# Patient Record
Sex: MALE | Race: WHITE | Employment: FULL TIME | URBAN - METROPOLITAN AREA
[De-identification: names, ages, dates, MRNs, and addresses within clinical notes are randomized per-mention and may not be internally consistent; named-entity substitution may affect disease eponyms.]

---

## 2018-08-27 ENCOUNTER — OFFICE VISIT (OUTPATIENT)
Dept: SURGERY | Age: 48
End: 2018-08-27

## 2018-08-27 VITALS
BODY MASS INDEX: 31.42 KG/M2 | SYSTOLIC BLOOD PRESSURE: 158 MMHG | DIASTOLIC BLOOD PRESSURE: 80 MMHG | TEMPERATURE: 98.2 F | OXYGEN SATURATION: 96 % | HEART RATE: 75 BPM | RESPIRATION RATE: 18 BRPM | HEIGHT: 72 IN | WEIGHT: 232 LBS

## 2018-08-27 DIAGNOSIS — D17.1 LIPOMA OF TORSO: Primary | ICD-10-CM

## 2018-08-27 NOTE — PROGRESS NOTES
Surgery Consult:  lipoma  Requesting physician:  Dr. Marina Varner:   Patient 50 y.o.  male presents with couple lumps on his right flank for few years. It's increasing in size and complains of pain especially with palpation. Denies any recent trauma to this area. No skin changes overlying the lump. No history of infection in this location. Past Medical & Surgical History:  History reviewed. No pertinent past medical history. History reviewed. No pertinent surgical history. Social History:  Social History     Social History    Marital status:      Spouse name: N/A    Number of children: N/A    Years of education: N/A     Occupational History    Not on file. Social History Main Topics    Smoking status: Former Smoker    Smokeless tobacco: Never Used    Alcohol use No    Drug use: No    Sexual activity: Not on file     Other Topics Concern    Not on file     Social History Narrative        Family History:  Family History   Problem Relation Age of Onset    Cancer Mother     Diabetes Mother     Diabetes Brother     Headache Maternal Grandfather     Diabetes Maternal Grandfather         Medications:  Current Outpatient Prescriptions   Medication Sig    HYDROcodone-acetaminophen (NORCO) 5-325 mg per tablet Take 1 tablet by mouth every four (4) hours as needed for Pain.  ondansetron (ZOFRAN ODT) 4 mg disintegrating tablet Take 1 tablet by mouth every eight (8) hours as needed for Nausea.  diphenoxylate-atropine (LOMOTIL) 2.5-0.025 mg per tablet Take 1 tablet by mouth four (4) times daily as needed for Diarrhea (1 tab after each stool for max 8 per day). Take after each stool for a maximum of 8 tablets daily     No current facility-administered medications for this visit. Allergies:  No Known Allergies    Review of Systems  A comprehensive review of systems was negative except for that written in the HPI.     Objective:     Exam:    Visit Vitals    BP 158/80 (BP 1 Location: Right arm, BP Patient Position: Sitting)    Pulse 75    Temp 98.2 °F (36.8 °C) (Oral)    Resp 18    Ht 6' (1.829 m)    Wt 105.2 kg (232 lb)    SpO2 96%    BMI 31.46 kg/m2     General appearance: alert, cooperative, no distress, appears stated age  Lungs: clear to auscultation bilaterally  Heart: regular rate and rhythm  Extremities: extremities normal, atraumatic, no cyanosis or edema. ABHI. Skin: Skin color, texture, turgor normal.  Right flank area with 3 x 1 and 2 x 1 cm rubbery mobile mass. These are approximately 3 cm apart. No erythema or skin changes overlying the lumps. Neurologic: Grossly normal    Assessment:     Right flank lipoma x 2    Plan:     Excision of right flank lipoma x 2 vs observation discussed. Risks, benefit, and alternative to surgery was discussed with the patient. The risks of surgery include but not limited to infection, bleeding, recurrence, and the risks of anesthetic. Patient wants to think about it and get back to us. All questions answered.

## 2018-08-27 NOTE — MR AVS SNAPSHOT
[address], [address] [address] 
[phone] Patient: Michael Chavarria MRN: [id number] Visit Information Date & Time Provider Department Dept. Phone Encounter #  
 8/27/2018  3:20 PM Ginger Cutler MD [facility] [id number] Upcoming Health Maintenance Date Due DTaP/Tdap/Td series (1 - Tdap) 3/26/1991 Influenza Age 5 to Adult 8/1/2018 Allergies as of 8/27/2018  Review Complete On: 8/27/2018 By: Ginger Cutler MD  
 No Known Allergies Current Immunizations  Never Reviewed No immunizations on file. Not reviewed this visit You Were Diagnosed With   
  
 Codes Comments Lipoma of torso    -  Primary ICD-10-CM: D17.1 ICD-9-CM: 214.1 Vitals BP Pulse Temp Resp Height(growth percentile) Weight(growth percentile) 158/80 (BP 1 Location: Right arm, BP Patient Position: Sitting) 75 98.2 °F (36.8 °C) (Oral) 18 6' (1.829 m) 232 lb (105.2 kg) SpO2 BMI Smoking Status 96% 31.46 kg/m2 Former Smoker BMI and BSA Data Body Mass Index Body Surface Area  
 31.46 kg/m 2 2.31 m 2 Preferred Pharmacy Pharmacy Name Phone [facility] [address] [place] [address] Dr [place], [address] [phone] Your Updated Medication List  
  
   
This list is accurate as of 8/27/18  4:50 PM.  Always use your most recent med list.  
  
  
  
  
 diphenoxylate-atropine 2.5-0.025 mg per tablet Commonly known as:  LOMOTIL Take 1 tablet by mouth four (4) times daily as needed for Diarrhea (1 tab after each stool for max 8 per day). Take after each stool for a maximum of 8 tablets daily HYDROcodone-acetaminophen 5-325 mg per tablet Commonly known as:  Doris Arts Take 1 tablet by mouth every four (4) hours as needed for Pain. ondansetron 4 mg disintegrating tablet Commonly known as:  ZOFRAN ODT  
 Take 1 tablet by mouth every eight (8) hours as needed for Nausea. Introducing [facility]! [employer] introduces [employer] patient portal. Now you can access parts of your medical record, email your doctor's office, and request medication refills online. 1. In your internet browser, go to [email or web address] 2. Click on the First Time User? Click Here link in the Sign In box. You will see the New Member Sign Up page. 3. Enter your Wandera Access Code exactly as it appears below. You will not need to use this code after youve completed the sign-up process. If you do not sign up before the expiration date, you must request a new code. · Wandera Access Code: [email or web address] Expires: 11/25/2018  3:30 PM 
 
4. Enter the last four digits of your Social Security Number (xxxx) and Date of Birth (mm/dd/yyyy) as indicated and click Submit. You will be taken to the next sign-up page. 5. Create a Wandera ID. This will be your Wandera login ID and cannot be changed, so think of one that is secure and easy to remember. 6. Create a Wandera password. You can change your password at any time. 7. Enter your Password Reset Question and Answer. This can be used at a later time if you forget your password. 8. Enter your e-mail address. You will receive e-mail notification when new information is available in [address] 9. Click Sign Up. You can now view and download portions of your medical record. 10. Click the Download Summary menu link to download a portable copy of your medical information. If you have questions, please visit the Frequently Asked Questions section of the Wandera website. Remember, W[employer]era is NOT to be used for urgent needs. For medical emergencies, dial 911. Now available from your iPhone and Android! Please provide this summary of care documentation to your next provider. Your primary care clinician is listed as Shira Burk. If you have any questions after today's visit, please call [phone].

## 2018-08-27 NOTE — PROGRESS NOTES
Chief Complaint   Patient presents with    New Patient    Mass     on abdomen; referred by Dr. Roderick Whitfield       1. Have you been to the ER, urgent care clinic since your last visit? Hospitalized since your last visit? No    2. Have you seen or consulted any other health care providers outside of the [facility] since your last visit? Include any pap smears or colon screening.  No

## 2019-11-05 ENCOUNTER — HOSPITAL ENCOUNTER (OUTPATIENT)
Dept: NON INVASIVE DIAGNOSTICS | Age: 49
Discharge: HOME | End: 2019-11-05
Payer: COMMERCIAL

## 2019-11-05 VITALS
HEART RATE: 76 BPM | RESPIRATION RATE: 21 BRPM | DIASTOLIC BLOOD PRESSURE: 69 MMHG | OXYGEN SATURATION: 96 % | WEIGHT: 220 LBS | HEIGHT: 72 IN | BODY MASS INDEX: 29.8 KG/M2 | SYSTOLIC BLOOD PRESSURE: 133 MMHG

## 2019-11-05 DIAGNOSIS — I35.0 AORTIC VALVE STENOSIS, ETIOLOGY OF CARDIAC VALVE DISEASE UNSPECIFIED: ICD-10-CM

## 2019-11-05 DIAGNOSIS — I34.1 MITRAL PROLAPSE: ICD-10-CM

## 2019-11-05 PROCEDURE — 74011250636 HC RX REV CODE- 250/636: Performed by: INTERNAL MEDICINE

## 2019-11-05 PROCEDURE — 74011000250 HC RX REV CODE- 250: Performed by: INTERNAL MEDICINE

## 2019-11-05 PROCEDURE — 99152 MOD SED SAME PHYS/QHP 5/>YRS: CPT

## 2019-11-05 PROCEDURE — 93325 DOPPLER ECHO COLOR FLOW MAPG: CPT

## 2019-11-05 PROCEDURE — 99153 MOD SED SAME PHYS/QHP EA: CPT

## 2019-11-05 RX ORDER — MIDAZOLAM HYDROCHLORIDE 1 MG/ML
.5-2 INJECTION, SOLUTION INTRAMUSCULAR; INTRAVENOUS
Status: DISCONTINUED | OUTPATIENT
Start: 2019-11-05 | End: 2019-11-05

## 2019-11-05 RX ORDER — LIDOCAINE HYDROCHLORIDE 20 MG/ML
15 SOLUTION OROPHARYNGEAL ONCE
Status: COMPLETED | OUTPATIENT
Start: 2019-11-05 | End: 2019-11-05

## 2019-11-05 RX ADMIN — MIDAZOLAM 2 MG: 1 INJECTION INTRAMUSCULAR; INTRAVENOUS at 10:35

## 2019-11-05 RX ADMIN — MIDAZOLAM 1 MG: 1 INJECTION INTRAMUSCULAR; INTRAVENOUS at 10:42

## 2019-11-05 RX ADMIN — MIDAZOLAM 1 MG: 1 INJECTION INTRAMUSCULAR; INTRAVENOUS at 10:39

## 2019-11-05 RX ADMIN — BENZOCAINE, BUTAMBEN, AND TETRACAINE HYDROCHLORIDE 1 SPRAY: .028; .004; .004 AEROSOL, SPRAY TOPICAL at 10:33

## 2019-11-05 RX ADMIN — LIDOCAINE HYDROCHLORIDE 15 ML: 20 SOLUTION ORAL; TOPICAL at 10:32

## 2019-11-05 RX ADMIN — MEPERIDINE HYDROCHLORIDE 25 MG: 50 INJECTION, SOLUTION INTRAMUSCULAR; INTRAVENOUS; SUBCUTANEOUS at 10:39

## 2019-11-05 RX ADMIN — MEPERIDINE HYDROCHLORIDE 25 MG: 50 INJECTION, SOLUTION INTRAMUSCULAR; INTRAVENOUS; SUBCUTANEOUS at 10:35

## 2019-11-05 NOTE — DISCHARGE INSTRUCTIONS
AFTER YOUR TRANSESOPHAGEAL ECHOCARDIOGRAM    Be sure someone else drives you home; do not drive today. You may feel drowsy for several hours. Do not eat or drink for at least two hours after your procedure. Your throat will be numb and there is a risk you might have difficulty swallowing for a while. Be careful when you do eat or drink for the first time especially with hot fluids since you could easily burn your throat. Call your doctor if:    · You are bleeding from your throat or mouth. · You have trouble breathing all of a sudden. · You have chest pain or any pain that spreads to your neck, jaw, or arms. · You have questions or concerns. · You have a fever greater than 101°F.    Special Instructions:  No driving for 24 hours.

## 2019-11-05 NOTE — PROGRESS NOTES
Discharge instructions reviewed with patient and wife, Leonor Gonzalez. Allowed adequate time to ask questions, all questions answered. Printed copy of AVS given to patient. All belongings gathered, IV and tele discontinued. Transported via wheelchair to main entrance and into care of family.

## 2019-11-05 NOTE — PROGRESS NOTES
Patient arrived to Non-Invasive Cardiology Lab for Out Patient JEANIE Procedure. Staff introduced to patient. Patient identifiers verified with Name and Date of Birth. Procedure verified with patient. Consent forms reviewed and signed by patient or authorized representative and verified. Allergies verified. Patient informed of procedure and plan of care. Questions answered with review. Patient on cardiac monitor, non-invasive blood pressure, SPO2 monitor. On room air. Patient is A&Ox3. Patient reports no complaints. Patient on stretcher, in low position, with side rails up. Patient instructed to call for assistance as needed. Family in waiting room.

## 2020-01-07 ENCOUNTER — HOSPITAL ENCOUNTER (OUTPATIENT)
Dept: GENERAL RADIOLOGY | Age: 50
Discharge: HOME | End: 2020-01-07
Payer: COMMERCIAL

## 2020-01-07 DIAGNOSIS — R06.02 SHORTNESS OF BREATH: ICD-10-CM

## 2020-01-07 PROCEDURE — 71046 X-RAY EXAM CHEST 2 VIEWS: CPT

## 2020-01-16 ENCOUNTER — HOSPITAL ENCOUNTER (OUTPATIENT)
Age: 50
Discharge: HOME | End: 2020-01-16
Attending: INTERNAL MEDICINE | Admitting: INTERNAL MEDICINE
Payer: COMMERCIAL

## 2020-01-16 VITALS
TEMPERATURE: 98.5 F | BODY MASS INDEX: 29.16 KG/M2 | SYSTOLIC BLOOD PRESSURE: 143 MMHG | HEIGHT: 73 IN | OXYGEN SATURATION: 98 % | WEIGHT: 220 LBS | DIASTOLIC BLOOD PRESSURE: 71 MMHG | HEART RATE: 70 BPM | RESPIRATION RATE: 19 BRPM

## 2020-01-16 DIAGNOSIS — R07.9 CHEST PAIN, UNSPECIFIED TYPE: ICD-10-CM

## 2020-01-16 LAB — END DIASTOLIC PRESSURE: 11

## 2020-01-16 PROCEDURE — C1760 CLOSURE DEV, VASC: HCPCS | Performed by: INTERNAL MEDICINE

## 2020-01-16 PROCEDURE — 74011250636 HC RX REV CODE- 250/636: Performed by: INTERNAL MEDICINE

## 2020-01-16 PROCEDURE — C1894 INTRO/SHEATH, NON-LASER: HCPCS | Performed by: INTERNAL MEDICINE

## 2020-01-16 PROCEDURE — 74011250637 HC RX REV CODE- 250/637: Performed by: INTERNAL MEDICINE

## 2020-01-16 PROCEDURE — 99152 MOD SED SAME PHYS/QHP 5/>YRS: CPT | Performed by: INTERNAL MEDICINE

## 2020-01-16 PROCEDURE — 74011000250 HC RX REV CODE- 250: Performed by: INTERNAL MEDICINE

## 2020-01-16 PROCEDURE — 93458 L HRT ARTERY/VENTRICLE ANGIO: CPT | Performed by: INTERNAL MEDICINE

## 2020-01-16 PROCEDURE — 77030029065 HC DRSG HEMO QCLOT ZMED -B: Performed by: INTERNAL MEDICINE

## 2020-01-16 PROCEDURE — 77030004549 HC CATH ANGI DX PRF MRTM -A: Performed by: INTERNAL MEDICINE

## 2020-01-16 PROCEDURE — C1769 GUIDE WIRE: HCPCS | Performed by: INTERNAL MEDICINE

## 2020-01-16 PROCEDURE — 99153 MOD SED SAME PHYS/QHP EA: CPT | Performed by: INTERNAL MEDICINE

## 2020-01-16 PROCEDURE — 74011636320 HC RX REV CODE- 636/320: Performed by: INTERNAL MEDICINE

## 2020-01-16 RX ORDER — ACETAMINOPHEN 500 MG
500 TABLET ORAL ONCE
Status: COMPLETED | OUTPATIENT
Start: 2020-01-16 | End: 2020-01-16

## 2020-01-16 RX ORDER — SODIUM CHLORIDE 0.9 % (FLUSH) 0.9 %
5-40 SYRINGE (ML) INJECTION EVERY 8 HOURS
Status: DISCONTINUED | OUTPATIENT
Start: 2020-01-16 | End: 2020-01-16 | Stop reason: HOSPADM

## 2020-01-16 RX ORDER — HEPARIN SODIUM 200 [USP'U]/100ML
INJECTION, SOLUTION INTRAVENOUS
Status: COMPLETED | OUTPATIENT
Start: 2020-01-16 | End: 2020-01-16

## 2020-01-16 RX ORDER — SODIUM CHLORIDE 9 MG/ML
100 INJECTION, SOLUTION INTRAVENOUS CONTINUOUS
Status: DISPENSED | OUTPATIENT
Start: 2020-01-16 | End: 2020-01-16

## 2020-01-16 RX ORDER — GUAIFENESIN 100 MG/5ML
81 LIQUID (ML) ORAL
Status: COMPLETED | OUTPATIENT
Start: 2020-01-16 | End: 2020-01-16

## 2020-01-16 RX ORDER — FENTANYL CITRATE 50 UG/ML
INJECTION, SOLUTION INTRAMUSCULAR; INTRAVENOUS AS NEEDED
Status: DISCONTINUED | OUTPATIENT
Start: 2020-01-16 | End: 2020-01-16 | Stop reason: HOSPADM

## 2020-01-16 RX ORDER — DIPHENHYDRAMINE HYDROCHLORIDE 50 MG/ML
25 INJECTION, SOLUTION INTRAMUSCULAR; INTRAVENOUS
Status: DISCONTINUED | OUTPATIENT
Start: 2020-01-16 | End: 2020-01-16 | Stop reason: HOSPADM

## 2020-01-16 RX ORDER — NALOXONE HYDROCHLORIDE 0.4 MG/ML
0.4 INJECTION, SOLUTION INTRAMUSCULAR; INTRAVENOUS; SUBCUTANEOUS AS NEEDED
Status: DISCONTINUED | OUTPATIENT
Start: 2020-01-16 | End: 2020-01-16 | Stop reason: HOSPADM

## 2020-01-16 RX ORDER — SODIUM CHLORIDE 0.9 % (FLUSH) 0.9 %
5-40 SYRINGE (ML) INJECTION AS NEEDED
Status: DISCONTINUED | OUTPATIENT
Start: 2020-01-16 | End: 2020-01-16 | Stop reason: HOSPADM

## 2020-01-16 RX ORDER — MIDAZOLAM HYDROCHLORIDE 1 MG/ML
INJECTION, SOLUTION INTRAMUSCULAR; INTRAVENOUS AS NEEDED
Status: DISCONTINUED | OUTPATIENT
Start: 2020-01-16 | End: 2020-01-16 | Stop reason: HOSPADM

## 2020-01-16 RX ORDER — LIDOCAINE HYDROCHLORIDE 10 MG/ML
INJECTION, SOLUTION EPIDURAL; INFILTRATION; INTRACAUDAL; PERINEURAL AS NEEDED
Status: DISCONTINUED | OUTPATIENT
Start: 2020-01-16 | End: 2020-01-16 | Stop reason: HOSPADM

## 2020-01-16 RX ADMIN — Medication 10 ML: at 14:23

## 2020-01-16 RX ADMIN — ACETAMINOPHEN 500 MG: 500 TABLET ORAL at 14:57

## 2020-01-16 RX ADMIN — ASPIRIN 81 MG 81 MG: 81 TABLET ORAL at 08:44

## 2020-01-16 NOTE — PROGRESS NOTES
11:15 AM Patient returned to room 2158 via bed from [facility]. R groin site c/d/i no bleeding and no hematoma. Pulses palpable all extremities. VSS. Assessment complete. Patient oriented to room and call bell system. Family at bedside. Bed locked and in lowest position. Will continue to monitor closely. 1:20 PM Hannah Hartman NP, at bedside. 2:45 PM Patient ambulated in hallway with RN x1. Patient denies c/o dizziness and shortness of breath. Patient c/o \"soreness\" and \"tightness\" at R groin site. R groin site remains c/d/i no bleeding and no hematoma post ambulation. Dr. Andressa Rico notified. Orders received for one time dose PO tylenol. See MAR.    3:20 PM PIV removed, tip intact. Discharge instructions reviewed with patient and spouse including, but not limited to, medications, follow up appointments and post cath site care. Patient and spouse given opportunity for questions and verbalized understanding of all instructions. Patient has all belongings and instructions on discharge. R groin cath site remains c/d/i no bleeding and no hematoma on discharge. Patient escorted to private vehicle on discharge. Patient's spouse to drive him home.

## 2020-01-16 NOTE — H&P
Outpatient Cath History and Physical     1/16/2020  Patient: Priyanka Kim 52 y.o. male   Chief Complaint:    [x]   Dyspnea         History of Present Illness: (for details see office notes)    Chest Pain:  [x] none,  consistent with  [] non-cardiac   [] atypical    [] anginal chest pain             [x]   none now    []    on-going  Dyspnea: [] none  [] at rest   [x] with exertion   [] improved   [] unchanged  [] worsening  PND:       [x] none   [] overnight   [] current  Orthopnea: [x] none  [] improved  [] unchanged  [] worsening  Presyncope: [x] none  [] improved  [] unchanged  [] worsening    History:(for details see office notes)  [x]   Severe MR            Past Medical History:   Diagnosis Date    GERD (gastroesophageal reflux disease)        Review of Systems  Except as noted in HPI, patient denies recent fever or chills, nausea, vomiting, diarrhea, hemoptysis, hematemesis, dysuria, myalgias, focal neurologic symptoms, ecchymosis, angioedema, odynophagia, dysphagia, sore throat, earache,rash, melena, hematochezia, depression, GERD, cold intolerance, petechia, bleeding gums, or significant weight loss. Family History   Problem Relation Age of Onset   [facility] Cancer Mother     Diabetes Mother     Diabetes Brother     Headache Maternal Grandfather     Diabetes Maternal Grandfather    (see office notes for details)  Social History     Tobacco Use    Smoking status: Former Smoker    Smokeless tobacco: Never Used   Substance Use Topics    Alcohol use: No   (see office notes for details)    Allergies: No Known Allergies    Prior to Admission Medications (details in office records):  Prior to Admission medications    Medication Sig Start Date End Date Taking? Authorizing Provider   multivitamin, tx-iron-ca-min (THERA-M W/ IRON) 9 mg iron-400 mcg tab tablet Take 1 Tab by mouth daily.    Yes Provider, Historical         Physical Exam:  Overall VSSAF;    Visit Vitals  /75 (BP 1 Location: Left arm, BP Patient Position: At rest)   Pulse (!) 108   Temp 98.4 °F (36.9 °C)   Resp 15   Ht 6' 1\" (1.854 m)   Wt 99.8 kg (220 lb)   SpO2 99%   BMI 29.03 kg/m²     Temp (24hrs), Av.4 °F (36.9 °C), Min:98.4 °F (36.9 °C), Max:98.4 °F (36.9 °C)    General Appearance: Well developed, well nourished, no acute distress. Ears/Nose/Mouth/Throat:   Hearing grossly normal.     JVP: WNL   Resp:   Lungs clear to auscultation bilaterally. Nl resp effort. Cardiovascular:  RRR, S1, S2 normal, no new murmur. No gallop or rub. Abdomen:   Soft, non-tender, bowel sounds are present. Extremities: No edema bilaterally. Skin:  Neuro: Warm and dry. A/O x3, grossly nonfocal     Labs: per outpatient records  CXR: per outpatient records    Plan of Care/Planned Procedure:  Risks, benefits, and alternatives reviewed with patient and he agrees to proceed with the procedure. For other plans, see orders.     Romulo Sher MD

## 2020-01-16 NOTE — PROGRESS NOTES
I met with Mr. Arleen Buck and his wife. I provided them an appointment on 1- at [facility] in the office as well as initial paperwork.  We appreciate the referral.

## 2020-01-16 NOTE — Clinical Note
TRANSFER - OUT REPORT:     Verbal report given to: ja diamond. Report consisted of patient's Situation, Background, Assessment and   Recommendations(SBAR). Opportunity for questions and clarification was provided. Patient transported with a Registered Nurse and [address] [place] [address] / [facility]. Patient transported to: [facility].

## 2020-01-16 NOTE — Clinical Note
Single view of the left ventricle obtained using power injection. Total volume = 30 mL. Rate = 10 mL/sec. Pressure = 900 PSI.  Rate of rise = 1.8.

## 2020-01-16 NOTE — DISCHARGE INSTRUCTIONS
[address]    [phone]  [place], [place] [address]    [email or web address]    Patient Discharge Instructions    Gretta Campbell / [id number] : 1970    Admitted 2020 Discharged 2020     · It is important that you take the medication exactly as they are prescribed. · Keep your medication in the bottles provided by the pharmacist and keep a list of the medication names, dosages, and times to be taken in your wallet. · Do not take other medications without consulting your doctor. BRING ALL OF YOUR MEDICINES or a list of medicines with dosages TO YOUR OFFICE VISIT with Dr. Estephanie Sanford. Cardiac Catheterization  Discharge Instructions     Do not drive, operate any machinery, or sign any legal documents for 24 hours after your procedure. You must have someone to drive you home.  You may take a shower 24 hours after your cardiac catheterization. Be sure to get the dressing wet and then remove it; gently wash the area with warm soapy water. Pat dry and leave open to air. To help prevent infections, be sure to keep the cath site clean and dry. No lotions, creams, powders, ointments, etc. in the cath site for approximately 1 week.  Do not take a tub bath, get in a hot tub or swimming pool for approximately 5 days or until the cath site is completely healed.  No strenuous activity or heavy lifting over 10 lbs. for 7 days.  Drink plenty of fluids for 24-48 hours after your cath to flush the contrast dye from your kidneys. No alcoholic beverages for 24 hours. You may resume your previous diet (low fat, low cholesterol) after your cath.  After your cath, some bruising or discomfort is common during the healing process. Tylenol, 1-2 tablets every 6 hours as needed, is recommended if you experience any discomfort.   If you experience any signs or symptoms of infection such as fever, chills, or poorly healing incision, persistent tenderness or swelling in the groin, redness and/or warmth to the touch, numbness, significant tingling or pain at the groin site or affected extremity, rash, drainage from the cath site, or if the leg feels tight or swollen, call your physician right away.  If bleeding at the cath site occurs, take a clean gauze pad and apply direct pressure to the groin just above the puncture site. Call 911 immediately, and continue to apply direct pressure until an ambulance gets to your location.  You may return to work  2  days after your cardiac cath if no bleeding at the cath site. If you are smoking, please stop. Smoking Cessation Program is a free, phone/text/email based, smoking cessation program. The program is individualized to meet each patient's needs. To enroll use this link [email or web address]/[email or web address]      Follow-up:   Follow-up Information     Follow up With Specialties Details Why Contact Michael Blevins MD Cardiology Schedule an appointment as soon as possible for a visit in 3 months  [address]  [id number]  [facility]  [phone]      Nate Jimenez MD Cardiothoracic Surgery  as they direct for mitral valve repair. [address] [facility]. [address] [facility]  [phone]            Information obtained by :  I understand that if any problems occur once I am at home I am to contact my physician. I understand and acknowledge receipt of the instructions indicated above.                                                                                                                                            R.N.'s Signature                                                                  Date/Time                                                                                                                                              Patient or Representative Signature                                                          Date/Time      Michael Low MD      [address]    [phone], [address]    [email or web address]

## 2020-01-16 NOTE — PROGRESS NOTES
1/16/2020      To whom it may concern:    Heather Piña was seen today. The patient may return to work 1/20/20. He should not lift more than 20 lbs until 1/23/20. Aileen Blake MD        [address]   [phone]  [address]    [email or web address]

## 2020-01-16 NOTE — PROGRESS NOTES
1/16/2020 11:21 AM  Patient without complaints. Last VS:   Visit Vitals  /64 (BP 1 Location: Right arm, BP Patient Position: At rest)   Pulse 67   Temp 98.4 °F (36.9 °C)   Resp 14   Ht 6' 1\" (1.854 m)   Wt 99.8 kg (220 lb)   SpO2 97%   BMI 29.03 kg/m²     Cath site without hematoma, bleeding or new bruit. Distal pulses at baseline. Continue current plan of care. Results of cath discussed with patient and available family members.

## 2020-01-21 ENCOUNTER — OFFICE VISIT (OUTPATIENT)
Dept: CARDIOTHORACIC SURGERY | Age: 50
End: 2020-01-21

## 2020-01-21 VITALS
SYSTOLIC BLOOD PRESSURE: 132 MMHG | WEIGHT: 230 LBS | DIASTOLIC BLOOD PRESSURE: 78 MMHG | HEIGHT: 73 IN | TEMPERATURE: 98.1 F | OXYGEN SATURATION: 98 % | BODY MASS INDEX: 30.48 KG/M2 | HEART RATE: 83 BPM

## 2020-01-21 DIAGNOSIS — I34.0 NONRHEUMATIC MITRAL VALVE REGURGITATION: Primary | ICD-10-CM

## 2020-01-21 DIAGNOSIS — K21.9 GASTROESOPHAGEAL REFLUX DISEASE WITHOUT ESOPHAGITIS: ICD-10-CM

## 2020-01-21 NOTE — PROGRESS NOTES
Patient: Arther Galeazzi   Age: 52 y.o. Patient Care Team:  Froylan Li MD as PCP - General (Family Practice)  Tisha Hunt MD as Surgeon (General Surgery)    PCP: Froylan Li MD    Cardiologist: Dr. Axel Guzman    Diagnosis/Reason for Consultation: The primary encounter diagnosis was Nonrheumatic mitral valve regurgitation. A diagnosis of Gastroesophageal reflux disease without esophagitis was also pertinent to this visit. Problem List: MR, Atypical Chest Pain, Dyspnea, Murmur, Obesity, GERD      HPI: 52 y.o.  male who is a [employer]  with PMHx of MR, Obesity,and Atypical Chest Pain that is referred to the [address] [place] [address] by Dr. Adarsh Foss for interventional evaluation of his severe mitral regurgitation. .Mr. Trino Zhou has complaints of fatigue,chest pain since he was a child, and dyspnea on exertion for about six months. He has undergone extensive cardiac work up with Dr. Adarsh Foss including TTE, JEANIE, and Cardiac Cath. He was noted to have Severe Mitral Regurgitation and was referred for surgical consultation. He is not on any medication currently except multivitamin. He is a nonsmoker and rarely drinks alcohol. NYHA Classification: Class II   Class II (Mild): Slight limitation of physical activity. Comfortable at rest, but ordinary physical activity results in fatigue, palpitation, or dyspnea.        Angina Classification: Class 0   Class 0: No symptoms    Past Medical History:   Diagnosis Date    GERD (gastroesophageal reflux disease)      Endocarditis: no  Pulmonary HTN: mild Greater than 2/3 systemic: no  Immunocompromised/Steroids: no  Heart Failure Admission w/in past year:  no  Heart Failure Admission w/in past 2 weeks: no  Liver Dz/Cirrhosis: no  If yes, MELD score:  Na    Last Dental Visit:  11/2019   Any dental pain/concerns: na  Past Surgical History:   Procedure Laterality Date    HX ORTHOPAEDIC      L arm tendon repair Social History     Tobacco Use    Smoking status: Former Smoker    Smokeless tobacco: Never Used   Substance Use Topics    Alcohol use: No      Family History   Problem Relation Age of Onset    Cancer Mother     Diabetes Mother     Diabetes Brother     Headache Maternal Grandfather     Diabetes Maternal Grandfather      Prior to Admission medications    Medication Sig Start Date End Date Taking? Authorizing Provider   multivitamin, tx-iron-ca-min (THERA-M W/ IRON) 9 mg iron-400 mcg tab tablet Take 1 Tab by mouth daily. Provider, Historical       No Known Allergies    Current Medications:   Current Outpatient Medications   Medication Sig Dispense Refill    multivitamin, tx-iron-ca-min (THERA-M W/ IRON) 9 mg iron-400 mcg tab tablet Take 1 Tab by mouth daily. Vitals: Blood pressure 132/78, pulse 83, temperature 98.1 °F (36.7 °C), temperature source Oral, height 6' 1\" (1.854 m), weight 230 lb (104.3 kg), SpO2 98 %. Allergies: has No Known Allergies.     Father  in 46s of cancer  Mother ovarian cancer  Grandfather CAD    Review of Systems: Pertinent Positives per HPI   [] Unable to obtain  ROS due to  []mental status change  []sedated   []intubated   [x]Total of 13 systems reviewed as follows:  Constitutional: Positive for fatigue,Negative fever, negative chills  Eyes:   Negative for amauroses fugax  ENT:   Negative sore throat,oral absecess  Endocrine Negative for thyroid replacement Rx; goiter; DM  Respiratory:  Negative chronic cough,sputum production  Cards:   Positive MEDINA,atypical chest pain,Negative for palpitations, lower extremity edema, varicosities, claudication  GI:   Negative for dysphagia, bleeding, nausea, vomiting, diarrhea, and abdominal pain  Genitourinary: Negative for frequency, dysuria, BPH   Integument:  Negative for rash and pruritus  Hematologic:  Negative for easy bruising; bleeding dyscarsia  Musculoskel: Negative for muscle weakness inhibiting ambulation  Neurological:  Negative for stroke, TIA, syncope, dizziness  Behavl/Psych: Negative for feelings of anxiety, depression     Cardiovascular Testing:   EK2019  NSR, QRSd 110, T Wave inversion, Angelia 130ms    TTE: 10/14/2019  LVEF 60%, 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 EF of 65%. 2) Normal RV size and function. 3) Structurally normal aortic valve; No aortic stenosis or regurgitation. 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 No mitral stenosis. 5) Structurally normal tricuspid valve; No tricuspid stenosis; trivial tricuspid regurgitation. 6) Pulmonary valve appears structurally normal.  7) LOREN is without thrombus. 8) No intra-cardiac shunt is seen on color doppler or agitated saline injection. 9) No intra-cardiac thrombus or vegetation is seen. 10) The visualized portions of the ascending aorta, aortic arch, and descending aorta appear normal.     Conclusion: MV and MR as above.        Cardiac catheterization: 2020  1. Normal LV function. 2. No CAD. 3. Severe MR     PLAN: Surgery consult for mitral valve repair.            Coronary Findings     Diagnostic   Dominance: Right   Left Main   The vessel is large. The vessel is angiographically normal.   Left Anterior Descending   The proximal segment of the vessel is large. The middle segment of the vessel is moderate in size. The distal segment of the vessel is moderate in size. The vessel is angiographically normal. 3 small diagonals. Ramus Intermedius   The vessel is moderate in size. The vessel is angiographically normal.   Left Circumflex   The vessel is large.  Trivial high OM then circ becomes a large OM and trivial AV groove circ. Right Coronary Artery   The vessel is moderate in size. The vessel is angiographically normal. Small PDA and postero-lateral vessels. Intervention     No interventions have been documented. Measurements     EDP: 11             Left Heart     Left Ventricle The left ventricular size is in the upper limits of normal. The estimated EF = 60 - 65%. There is severe (4+) mitral regurgitation. Aortic Valve There is no aortic valve stenosis. PA and Lateral: 1- WNL    Carotid Dopplers: not done    PFTs: FEV1/Predicted:  Not done  Normal FEV1 > 1 Liter, Predicted = 100%, DLCO > 80%    Mild Lung Disease (60-70% Predicted)    Moderate Lung Disease (50-55% Predicted)    Severe Lung Disease (< 50% Predicted or PO2 < 60 or pCO2>50 on RA)      Physical Exam:  General: Well nourished well groomed male appearing stated age accompanied by his wife  Neuro: A&OX3. CASTILLO. PERRL. Steady unassisted gait  Head:Normocephalic. Atraumatic. Symmetrical  Neck: Trachea Midline  Resp: CTA B. No Adv BS/cough/sputum/tachypnea with seated conversation  CV: S1S2 RRR. ANTHONY II/VI. No JVD/carotid bruits. Pink/warm/dry extremities. No LE peripheral edema  GI:Benign ab. Soft. NT/ND. Active BS  : Voids  Integ: No obvious s/s of infection or breakdown  Musculo/Skeletal: FROM in all major joints.  normal muscle tone      STS 2.9 Risk Score / Predicted 30 day mortality: - calculations scanned into EMR  Procedure: MV Repair CALCULATE   Risk of Mortality:  0.364%    Renal Failure:  0.874%    Permanent Stroke:  0.411%    Prolonged Ventilation:  2.824%    DSW Infection:  0.038%    Reoperation:  2.793%    Morbidity or Mortality:  5.453%    Short Length of Stay:  69.222%    Long Length of Stay:  1.399%     Procedure: Isolated MVR CALCULATE   Risk of Mortality:  0.915%    Renal Failure:  1.817%    Permanent Stroke:  0.684%    Prolonged Ventilation:  4.995%    DSW Infection:  0.073%    Reoperation:  4.437% Morbidity or Mortality:  8.766%    Short Length of Stay:  41.654%    Long Length of Stay:  3.678%         Assessment/Plan:   1. Mitral Regurgitation - severe and symptomatic. We discussed Mr. Severiano Diehl in clinic and reviewed available documentation and imaging. Dr. Jr Gonzales review TTE on disc. Will order CTA of chest, abdomen and pelvis and then schedule for surgery. 2. GERD - Consider PPI  3.  Obesity - is limited due to dyspnea  4  Further plan/care by Dr. Jr Gonzales

## 2020-01-30 ENCOUNTER — HOSPITAL ENCOUNTER (OUTPATIENT)
Dept: CT IMAGING | Age: 50
Discharge: HOME | End: 2020-01-30
Attending: NURSE PRACTITIONER
Payer: COMMERCIAL

## 2020-01-30 DIAGNOSIS — I34.0 NONRHEUMATIC MITRAL VALVE REGURGITATION: ICD-10-CM

## 2020-01-30 PROCEDURE — 74174 CTA ABD&PLVS W/CONTRAST: CPT

## 2020-01-30 PROCEDURE — 74011636320 HC RX REV CODE- 636/320: Performed by: NURSE PRACTITIONER

## 2020-01-30 PROCEDURE — 71275 CT ANGIOGRAPHY CHEST: CPT

## 2020-01-30 RX ORDER — SODIUM CHLORIDE 0.9 % (FLUSH) 0.9 %
10 SYRINGE (ML) INJECTION
Status: COMPLETED | OUTPATIENT
Start: 2020-01-30 | End: 2020-01-30

## 2020-01-30 RX ADMIN — Medication 10 ML: at 08:57

## 2020-01-30 RX ADMIN — IOPAMIDOL 100 ML: 755 INJECTION, SOLUTION INTRAVENOUS at 08:57

## 2020-02-17 ENCOUNTER — OFFICE VISIT (OUTPATIENT)
Dept: CARDIOTHORACIC SURGERY | Age: 50
End: 2020-02-17

## 2020-02-17 VITALS
WEIGHT: 233 LBS | HEIGHT: 73 IN | OXYGEN SATURATION: 97 % | TEMPERATURE: 98.3 F | HEART RATE: 100 BPM | BODY MASS INDEX: 30.88 KG/M2 | SYSTOLIC BLOOD PRESSURE: 138 MMHG | DIASTOLIC BLOOD PRESSURE: 82 MMHG

## 2020-02-17 DIAGNOSIS — I34.0 NONRHEUMATIC MITRAL VALVE REGURGITATION: Primary | ICD-10-CM

## 2020-02-18 DIAGNOSIS — I34.0 NONRHEUMATIC MITRAL VALVE REGURGITATION: Primary | ICD-10-CM

## 2020-02-19 NOTE — PROGRESS NOTES
Pt seen and examined. Had a long discussion about risks, benefits and timing, as well as expected hospital course. We decided on March 17th. I answered all of their questions. They are in agreement for a mini-MVr.

## 2020-03-10 ENCOUNTER — HOSPITAL ENCOUNTER (OUTPATIENT)
Dept: PREADMISSION TESTING | Age: 50
Discharge: HOME | End: 2020-03-10
Payer: COMMERCIAL

## 2020-03-10 ENCOUNTER — HOSPITAL ENCOUNTER (OUTPATIENT)
Dept: VASCULAR SURGERY | Age: 50
Discharge: HOME | End: 2020-03-10
Attending: NURSE PRACTITIONER
Payer: COMMERCIAL

## 2020-03-10 VITALS
OXYGEN SATURATION: 99 % | DIASTOLIC BLOOD PRESSURE: 80 MMHG | BODY MASS INDEX: 31.77 KG/M2 | HEART RATE: 78 BPM | SYSTOLIC BLOOD PRESSURE: 141 MMHG | RESPIRATION RATE: 20 BRPM | TEMPERATURE: 98.2 F | WEIGHT: 234.57 LBS | HEIGHT: 72 IN

## 2020-03-10 DIAGNOSIS — I34.0 NONRHEUMATIC MITRAL VALVE REGURGITATION: ICD-10-CM

## 2020-03-10 PROBLEM — Z01.810 PREOP CARDIOVASCULAR EXAM: Status: ACTIVE | Noted: 2020-03-10

## 2020-03-10 PROBLEM — I65.23 BILATERAL CAROTID ARTERY STENOSIS: Status: ACTIVE | Noted: 2020-03-10

## 2020-03-10 LAB
ABO + RH BLD: NORMAL
ALBUMIN SERPL-MCNC: 3.8 G/DL (ref 3.5–5)
ALBUMIN/GLOB SERPL: 1 {RATIO} (ref 1.1–2.2)
ALP SERPL-CCNC: 94 U/L (ref 45–117)
ALT SERPL-CCNC: 30 U/L (ref 12–78)
ANION GAP SERPL CALC-SCNC: 6 MMOL/L (ref 5–15)
APPEARANCE UR: ABNORMAL
APTT PPP: 28.8 SEC (ref 22.1–32)
ARTERIAL PATENCY WRIST A: YES
AST SERPL-CCNC: 18 U/L (ref 15–37)
BACTERIA URNS QL MICRO: NEGATIVE /HPF
BASE EXCESS BLD CALC-SCNC: 2 MMOL/L
BASOPHILS # BLD: 0 K/UL (ref 0–0.1)
BASOPHILS NFR BLD: 1 % (ref 0–1)
BDY SITE: ABNORMAL
BILIRUB SERPL-MCNC: 1.4 MG/DL (ref 0.2–1)
BILIRUB UR QL: NEGATIVE
BLOOD GROUP ANTIBODIES SERPL: NORMAL
BNP SERPL-MCNC: 101 PG/ML
BUN SERPL-MCNC: 11 MG/DL (ref 6–20)
BUN/CREAT SERPL: 9 (ref 12–20)
CA-I BLD-SCNC: 1.23 MMOL/L (ref 1.12–1.32)
CALCIUM SERPL-MCNC: 8.9 MG/DL (ref 8.5–10.1)
CHLORIDE SERPL-SCNC: 106 MMOL/L (ref 97–108)
CO2 SERPL-SCNC: 26 MMOL/L (ref 21–32)
COLOR UR: ABNORMAL
CREAT SERPL-MCNC: 1.24 MG/DL (ref 0.7–1.3)
DIFFERENTIAL METHOD BLD: ABNORMAL
EOSINOPHIL # BLD: 0.1 K/UL (ref 0–0.4)
EOSINOPHIL NFR BLD: 2 % (ref 0–7)
EPITH CASTS URNS QL MICRO: ABNORMAL /LPF
ERYTHROCYTE [DISTWIDTH] IN BLOOD BY AUTOMATED COUNT: 12.6 % (ref 11.5–14.5)
EST. AVERAGE GLUCOSE BLD GHB EST-MCNC: 114 MG/DL
GAS FLOW.O2 O2 DELIVERY SYS: ABNORMAL L/MIN
GLOBULIN SER CALC-MCNC: 3.7 G/DL (ref 2–4)
GLUCOSE SERPL-MCNC: 98 MG/DL (ref 65–100)
GLUCOSE UR STRIP.AUTO-MCNC: NEGATIVE MG/DL
HBA1C MFR BLD: 5.6 % (ref 4–5.6)
HCO3 BLD-SCNC: 26.3 MMOL/L (ref 22–26)
HCT VFR BLD AUTO: 44.8 % (ref 36.6–50.3)
HGB BLD-MCNC: 15.1 G/DL (ref 12.1–17)
HGB UR QL STRIP: NEGATIVE
HYALINE CASTS URNS QL MICRO: ABNORMAL /LPF (ref 0–5)
IMM GRANULOCYTES # BLD AUTO: 0 K/UL (ref 0–0.04)
IMM GRANULOCYTES NFR BLD AUTO: 1 % (ref 0–0.5)
INR PPP: 1 (ref 0.9–1.1)
KETONES UR QL STRIP.AUTO: NEGATIVE MG/DL
LEFT CCA DIST DIAS: 19.1 CM/S
LEFT CCA DIST SYS: 112.8 CM/S
LEFT CCA PROX DIAS: 22.3 CM/S
LEFT CCA PROX SYS: 145.2 CM/S
LEFT ECA DIAS: 13.65 CM/S
LEFT ECA SYS: 119.5 CM/S
LEFT ICA DIST DIAS: 44.3 CM/S
LEFT ICA DIST SYS: 113.9 CM/S
LEFT ICA MID DIAS: 21 CM/S
LEFT ICA MID SYS: 68.8 CM/S
LEFT ICA PROX DIAS: 13.1 CM/S
LEFT ICA PROX SYS: 58.8 CM/S
LEFT ICA/CCA SYS: 1.01
LEFT SUBCLAVIAN DIAS: 0 CM/S
LEFT SUBCLAVIAN SYS: 161.3 CM/S
LEFT VERTEBRAL DIAS: 9.53 CM/S
LEFT VERTEBRAL SYS: 62.8 CM/S
LEUKOCYTE ESTERASE UR QL STRIP.AUTO: NEGATIVE
LYMPHOCYTES # BLD: 2.1 K/UL (ref 0.8–3.5)
LYMPHOCYTES NFR BLD: 36 % (ref 12–49)
MAGNESIUM SERPL-MCNC: 2.2 MG/DL (ref 1.6–2.4)
MCH RBC QN AUTO: 30 PG (ref 26–34)
MCHC RBC AUTO-ENTMCNC: 33.7 G/DL (ref 30–36.5)
MCV RBC AUTO: 89.1 FL (ref 80–99)
MONOCYTES # BLD: 0.4 K/UL (ref 0–1)
MONOCYTES NFR BLD: 6 % (ref 5–13)
NEUTS SEG # BLD: 3.2 K/UL (ref 1.8–8)
NEUTS SEG NFR BLD: 54 % (ref 32–75)
NITRITE UR QL STRIP.AUTO: NEGATIVE
NRBC # BLD: 0 K/UL (ref 0–0.01)
NRBC BLD-RTO: 0 PER 100 WBC
PCO2 BLD: 40 MMHG (ref 35–45)
PH BLD: 7.43 [PH] (ref 7.35–7.45)
PH UR STRIP: 7.5 [PH] (ref 5–8)
PLATELET # BLD AUTO: 212 K/UL (ref 150–400)
PMV BLD AUTO: 10.2 FL (ref 8.9–12.9)
PO2 BLD: 100 MMHG (ref 80–100)
POTASSIUM SERPL-SCNC: 4 MMOL/L (ref 3.5–5.1)
PROT SERPL-MCNC: 7.5 G/DL (ref 6.4–8.2)
PROT UR STRIP-MCNC: NEGATIVE MG/DL
PROTHROMBIN TIME: 10 SEC (ref 9–11.1)
RBC # BLD AUTO: 5.03 M/UL (ref 4.1–5.7)
RBC #/AREA URNS HPF: ABNORMAL /HPF (ref 0–5)
RIGHT CCA DIST DIAS: 16.4 CM/S
RIGHT CCA DIST SYS: 122.3 CM/S
RIGHT CCA PROX DIAS: 13.7 CM/S
RIGHT CCA PROX SYS: 161.3 CM/S
RIGHT ECA DIAS: 11.63 CM/S
RIGHT ECA SYS: 118.1 CM/S
RIGHT ICA DIST DIAS: 23.5 CM/S
RIGHT ICA DIST SYS: 84.6 CM/S
RIGHT ICA MID DIAS: 21.5 CM/S
RIGHT ICA MID SYS: 76.7 CM/S
RIGHT ICA PROX DIAS: 15.6 CM/S
RIGHT ICA PROX SYS: 102.3 CM/S
RIGHT ICA/CCA SYS: 0.8
RIGHT SUBCLAVIAN DIAS: 0 CM/S
RIGHT SUBCLAVIAN SYS: 205.9 CM/S
RIGHT VERTEBRAL DIAS: 13.6 CM/S
RIGHT VERTEBRAL SYS: 78.7 CM/S
SAO2 % BLD: 98 % (ref 92–97)
SODIUM SERPL-SCNC: 138 MMOL/L (ref 136–145)
SP GR UR REFRACTOMETRY: 1.01 (ref 1–1.03)
SPECIMEN EXP DATE BLD: NORMAL
SPECIMEN TYPE: ABNORMAL
THERAPEUTIC RANGE,PTTT: NORMAL SECS (ref 58–77)
TOTAL RESP. RATE, ITRR: 16
TSH SERPL DL<=0.05 MIU/L-ACNC: 1.52 UIU/ML (ref 0.36–3.74)
UROBILINOGEN UR QL STRIP.AUTO: 1 EU/DL (ref 0.2–1)
WBC # BLD AUTO: 5.9 K/UL (ref 4.1–11.1)
WBC URNS QL MICRO: ABNORMAL /HPF (ref 0–4)

## 2020-03-10 PROCEDURE — 83735 ASSAY OF MAGNESIUM: CPT

## 2020-03-10 PROCEDURE — 85610 PROTHROMBIN TIME: CPT

## 2020-03-10 PROCEDURE — 80053 COMPREHEN METABOLIC PANEL: CPT

## 2020-03-10 PROCEDURE — 36415 COLL VENOUS BLD VENIPUNCTURE: CPT

## 2020-03-10 PROCEDURE — 83036 HEMOGLOBIN GLYCOSYLATED A1C: CPT

## 2020-03-10 PROCEDURE — 93005 ELECTROCARDIOGRAM TRACING: CPT

## 2020-03-10 PROCEDURE — 85730 THROMBOPLASTIN TIME PARTIAL: CPT

## 2020-03-10 PROCEDURE — 93880 EXTRACRANIAL BILAT STUDY: CPT

## 2020-03-10 PROCEDURE — 87086 URINE CULTURE/COLONY COUNT: CPT

## 2020-03-10 PROCEDURE — 81001 URINALYSIS AUTO W/SCOPE: CPT

## 2020-03-10 PROCEDURE — 82803 BLOOD GASES ANY COMBINATION: CPT

## 2020-03-10 PROCEDURE — 86850 RBC ANTIBODY SCREEN: CPT

## 2020-03-10 PROCEDURE — 83880 ASSAY OF NATRIURETIC PEPTIDE: CPT

## 2020-03-10 PROCEDURE — 36600 WITHDRAWAL OF ARTERIAL BLOOD: CPT

## 2020-03-10 PROCEDURE — 84443 ASSAY THYROID STIM HORMONE: CPT

## 2020-03-10 PROCEDURE — 85025 COMPLETE CBC W/AUTO DIFF WBC: CPT

## 2020-03-10 RX ORDER — AMIODARONE HYDROCHLORIDE 200 MG/1
400 TABLET ORAL 2 TIMES DAILY
Qty: 24 TAB | Refills: 0 | Status: SHIPPED | OUTPATIENT
Start: 2020-03-10 | End: 2020-03-16

## 2020-03-10 RX ORDER — SODIUM CHLORIDE, SODIUM LACTATE, POTASSIUM CHLORIDE, CALCIUM CHLORIDE 600; 310; 30; 20 MG/100ML; MG/100ML; MG/100ML; MG/100ML
25 INJECTION, SOLUTION INTRAVENOUS CONTINUOUS
Status: CANCELLED | OUTPATIENT
Start: 2020-03-17

## 2020-03-10 RX ORDER — MUPIROCIN 20 MG/G
OINTMENT TOPICAL 2 TIMES DAILY
Qty: 22 G | Refills: 0 | Status: SHIPPED | OUTPATIENT
Start: 2020-03-16 | End: 2020-03-23

## 2020-03-10 RX ORDER — CHLORHEXIDINE GLUCONATE 1.2 MG/ML
15 RINSE ORAL 2 TIMES DAILY
Qty: 1 BOTTLE | Refills: 0 | Status: SHIPPED | OUTPATIENT
Start: 2020-03-16 | End: 2020-03-23

## 2020-03-10 NOTE — PERIOP NOTES
[facility]  Preoperative Instructions        Surgery Date 03/17/20          Time of Arrival 0530  Contact # [phone] cell    1. On the day of your surgery, please report to the Surgical Services Registration Desk and sign in at your designated time. The Surgery Center is located to the right of the Emergency Room. 2. You must have someone with you to drive you home. You should not drive a car for 24 hours following surgery. Please make arrangements for a friend or family member to stay with you for the first 24 hours after your surgery. 3. Do not have anything to eat or drink (including water, gum, mints, coffee, juice) after midnight 03/16/20    . ? This may not apply to medications prescribed by your physician. ?(Please note below the special instructions with medications to take the morning of your procedure.)    4. We recommend you do not drink any alcoholic beverages for 24 hours before and after your surgery. 5. Contact your surgeons office for instructions on the following medications: non-steroidal anti-inflammatory drugs (i.e. Advil, Aleve), vitamins, and supplements. (Some surgeons will want you to stop these medications prior to surgery and others may allow you to take them)  **If you are currently taking Plavix, Coumadin, Aspirin and/or other blood-thinning agents, contact your surgeon for instructions. ** Your surgeon will partner with the physician prescribing these medications to determine if it is safe to stop or if you need to continue taking. Please do not stop taking these medications without instructions from your surgeon    6. Wear comfortable clothes. Wear glasses instead of contacts. Do not bring any money or jewelry. Please bring picture ID, insurance card, and any prearranged co-payment or hospital payment. Do not wear make-up, particularly mascara the morning of your surgery.   Do not wear nail polish, particularly if you are having foot /hand surgery. Wear your hair loose or down, no ponytails, buns, mackenzie pins or clips. All body piercings must be removed. Please shower with antibacterial soap for three consecutive days before and on the morning of surgery, but do not apply any lotions, powders or deodorants after the shower on the day of surgery. Please use a fresh towels after each shower. Please sleep in clean clothes and change bed linens the night before surgery. Please do not shave for 48 hours prior to surgery. Shaving of the face is acceptable. 7. You should understand that if you do not follow these instructions your surgery may be cancelled. If your physical condition changes (I.e. fever, cold or flu) please contact your surgeon as soon as possible. 8. It is important that you be on time. If a situation occurs where you may be late, please call [phone] (OR Holding Area). 9. If you have any questions and or problems, please call [phone] (Pre-admission Testing). 10. Your surgery time may be subject to change. You will receive a phone call the evening prior if your time changes. 11.  If having outpatient surgery, you must have someone to drive you here, stay with you during the duration of your stay, and to drive you home at time of discharge. 12.   In an effort to improve the efficiency, privacy, and safety for all of our Pre-op patients visitors are not allowed in the Holding area. Once you arrive and are registered your family/visitors will be asked to remain in the waiting room. The Pre-op staff will get you from the Surgical Waiting Area and will explain to you and your family/visitors that the Pre-op phase is beginning. The staff will answer any questions and provide instructions for tracking of the patient, by use of the existing tracking number and color-coded status board in the waiting room.   At this time the staff will also ask for your designated spokesperson information in the event that the physician or staff need to provide an update or obtain any pertinent information. The designated spokesperson will be notified if the physician needs to speak to family during the pre-operative phase. If at any time your family/visitors has questions or concerns they may approach the volunteer desk in the waiting area for assistance. Special Instructions: Take prescriptions per Lita Paulino (amiodarone)start today 03/10/20    TAKE ALL MEDICATIONS DAY OF SURGERY EXCEPT:no medications  Please bring bactroban/mupirocin and peridex/chlorhexidine to the hospital for use--may use both morning of surgery      I understand a pre-operative phone call will be made to verify my surgery time. In the event that I am not available, I give permission for a message to be left on my answering service and/or with another person?   yes          ___________________      __________   _________    (Signature of Patient)             (Witness)                (Date and Time)

## 2020-03-10 NOTE — PERIOP NOTES
Incentive Spirometer        Using the incentive spirometer helps expand the small air sacs of your lungs, helps you breathe deeply, and helps improve your lung function. Use your incentive spirometer twice a day (10 breaths each time) prior to surgery. How to Use Your Incentive Spirometer:  1. Hold the incentive spirometer in an upright position. 2. Breathe out as usual.   3. Place the mouthpiece in your mouth and seal your lips tightly around it. 4. Take a deep breath. Breathe in slowly and as deeply as possible. Keep the blue flow rate guide between the arrows. 5. Hold your breath as long as possible. Then exhale slowly and allow the piston to fall to the bottom of the column. 6. Rest for a few seconds and repeat steps one through five at least 10 times. PAT Tidal Volume______2500____________  x_____2___________  Date_____03/10/20__________________    W[place]rey  THE INCENTIVE SPIROMETER WITH YOU TO THE HOSPITAL ON THE DAY OF YOUR SURGERY. Opportunity given to ask and answer questions as well as to observe return demonstration.     Patient signature_____________________________    Witness____________________________

## 2020-03-10 NOTE — PERIOP NOTES
Pamphlet for Heart Valve Replacement/Cardiac Rehab provided with pre-op appointment. Patient will review and call surgeon office for any questions related to surgery procedure. Bar of Dial soap provided for showering 3 days prior to surgery and morning of surgery.

## 2020-03-11 LAB
ATRIAL RATE: 70 BPM
BACTERIA SPEC CULT: NORMAL
BACTERIA SPEC CULT: NORMAL
CALCULATED P AXIS, ECG09: 40 DEGREES
CALCULATED R AXIS, ECG10: 32 DEGREES
CALCULATED T AXIS, ECG11: 32 DEGREES
DIAGNOSIS, 93000: NORMAL
P-R INTERVAL, ECG05: 138 MS
Q-T INTERVAL, ECG07: 388 MS
QRS DURATION, ECG06: 102 MS
QTC CALCULATION (BEZET), ECG08: 419 MS
SERVICE CMNT-IMP: NORMAL
VENTRICULAR RATE, ECG03: 70 BPM

## 2020-03-14 LAB
BACTERIA SPEC CULT: ABNORMAL
CC UR VC: ABNORMAL
SERVICE CMNT-IMP: ABNORMAL

## 2020-04-09 PROBLEM — Z01.810 PREOP CARDIOVASCULAR EXAM: Status: RESOLVED | Noted: 2020-03-10 | Resolved: 2020-04-09

## 2020-04-22 ENCOUNTER — DOCUMENTATION ONLY (OUTPATIENT)
Dept: CARDIOTHORACIC SURGERY | Age: 50
End: 2020-04-22

## 2020-04-22 NOTE — PROGRESS NOTES
Checked in with Mr. Celeste Olvera today, who needs a mini-MVr. We discussed timing of his surgery. Given the current restrictions for visitors are still in place and he reports no change in his symptoms (sob only with extreme exertion) he requested to put off surgery another 3-4 weeks. I will call him again in 3 weeks to re-evaluate.

## 2020-07-16 ENCOUNTER — HOSPITAL ENCOUNTER (OUTPATIENT)
Dept: PREADMISSION TESTING | Age: 50
Discharge: HOME | End: 2020-07-16
Attending: THORACIC SURGERY (CARDIOTHORACIC VASCULAR SURGERY)
Payer: COMMERCIAL

## 2020-07-16 ENCOUNTER — ANESTHESIA EVENT (OUTPATIENT)
Dept: CARDIOTHORACIC SURGERY | Age: 50
DRG: 220 | End: 2020-07-16
Payer: COMMERCIAL

## 2020-07-16 ENCOUNTER — HOSPITAL ENCOUNTER (OUTPATIENT)
Dept: GENERAL RADIOLOGY | Age: 50
Discharge: HOME | End: 2020-07-16
Attending: THORACIC SURGERY (CARDIOTHORACIC VASCULAR SURGERY)
Payer: COMMERCIAL

## 2020-07-16 VITALS
DIASTOLIC BLOOD PRESSURE: 65 MMHG | HEART RATE: 90 BPM | HEIGHT: 72 IN | TEMPERATURE: 98.1 F | WEIGHT: 232.37 LBS | BODY MASS INDEX: 31.47 KG/M2 | SYSTOLIC BLOOD PRESSURE: 130 MMHG

## 2020-07-16 LAB
ALBUMIN SERPL-MCNC: 3.7 G/DL (ref 3.5–5)
ALBUMIN/GLOB SERPL: 0.9 {RATIO} (ref 1.1–2.2)
ALP SERPL-CCNC: 80 U/L (ref 45–117)
ALT SERPL-CCNC: 27 U/L (ref 12–78)
ANION GAP SERPL CALC-SCNC: 5 MMOL/L (ref 5–15)
APPEARANCE UR: CLEAR
APTT PPP: 29.5 SEC (ref 22.1–32)
AST SERPL-CCNC: 14 U/L (ref 15–37)
BACTERIA URNS QL MICRO: NEGATIVE /HPF
BASOPHILS # BLD: 0 K/UL (ref 0–0.1)
BASOPHILS NFR BLD: 1 % (ref 0–1)
BILIRUB SERPL-MCNC: 1.6 MG/DL (ref 0.2–1)
BILIRUB UR QL: NEGATIVE
BNP SERPL-MCNC: 83 PG/ML
BUN SERPL-MCNC: 17 MG/DL (ref 6–20)
BUN/CREAT SERPL: 13 (ref 12–20)
CALCIUM SERPL-MCNC: 8.7 MG/DL (ref 8.5–10.1)
CHLORIDE SERPL-SCNC: 108 MMOL/L (ref 97–108)
CO2 SERPL-SCNC: 26 MMOL/L (ref 21–32)
COLOR UR: NORMAL
CREAT SERPL-MCNC: 1.32 MG/DL (ref 0.7–1.3)
DIFFERENTIAL METHOD BLD: ABNORMAL
EOSINOPHIL # BLD: 0.1 K/UL (ref 0–0.4)
EOSINOPHIL NFR BLD: 2 % (ref 0–7)
EPITH CASTS URNS QL MICRO: NORMAL /LPF
ERYTHROCYTE [DISTWIDTH] IN BLOOD BY AUTOMATED COUNT: 12.2 % (ref 11.5–14.5)
EST. AVERAGE GLUCOSE BLD GHB EST-MCNC: 111 MG/DL
GLOBULIN SER CALC-MCNC: 3.9 G/DL (ref 2–4)
GLUCOSE SERPL-MCNC: 133 MG/DL (ref 65–100)
GLUCOSE UR STRIP.AUTO-MCNC: NEGATIVE MG/DL
HBA1C MFR BLD: 5.5 % (ref 4–5.6)
HCT VFR BLD AUTO: 45 % (ref 36.6–50.3)
HGB BLD-MCNC: 15.7 G/DL (ref 12.1–17)
HGB UR QL STRIP: NEGATIVE
HYALINE CASTS URNS QL MICRO: NORMAL /LPF (ref 0–5)
IMM GRANULOCYTES # BLD AUTO: 0 K/UL (ref 0–0.04)
IMM GRANULOCYTES NFR BLD AUTO: 1 % (ref 0–0.5)
INR PPP: 1 (ref 0.9–1.1)
KETONES UR QL STRIP.AUTO: NEGATIVE MG/DL
LEUKOCYTE ESTERASE UR QL STRIP.AUTO: NEGATIVE
LYMPHOCYTES # BLD: 2.1 K/UL (ref 0.8–3.5)
LYMPHOCYTES NFR BLD: 35 % (ref 12–49)
MCH RBC QN AUTO: 30.9 PG (ref 26–34)
MCHC RBC AUTO-ENTMCNC: 34.9 G/DL (ref 30–36.5)
MCV RBC AUTO: 88.6 FL (ref 80–99)
MONOCYTES # BLD: 0.4 K/UL (ref 0–1)
MONOCYTES NFR BLD: 6 % (ref 5–13)
NEUTS SEG # BLD: 3.3 K/UL (ref 1.8–8)
NEUTS SEG NFR BLD: 55 % (ref 32–75)
NITRITE UR QL STRIP.AUTO: NEGATIVE
NRBC # BLD: 0 K/UL (ref 0–0.01)
NRBC BLD-RTO: 0 PER 100 WBC
PH UR STRIP: 6.5 [PH] (ref 5–8)
PLATELET # BLD AUTO: 196 K/UL (ref 150–400)
PMV BLD AUTO: 10.1 FL (ref 8.9–12.9)
POTASSIUM SERPL-SCNC: 3.8 MMOL/L (ref 3.5–5.1)
PROT SERPL-MCNC: 7.6 G/DL (ref 6.4–8.2)
PROT UR STRIP-MCNC: NEGATIVE MG/DL
PROTHROMBIN TIME: 10.3 SEC (ref 9–11.1)
RBC # BLD AUTO: 5.08 M/UL (ref 4.1–5.7)
RBC #/AREA URNS HPF: NORMAL /HPF (ref 0–5)
SODIUM SERPL-SCNC: 139 MMOL/L (ref 136–145)
SP GR UR REFRACTOMETRY: 1.02 (ref 1–1.03)
THERAPEUTIC RANGE,PTTT: NORMAL SECS (ref 58–77)
UROBILINOGEN UR QL STRIP.AUTO: 0.2 EU/DL (ref 0.2–1)
WBC # BLD AUTO: 6 K/UL (ref 4.1–11.1)
WBC URNS QL MICRO: NORMAL /HPF (ref 0–4)

## 2020-07-16 PROCEDURE — 86900 BLOOD TYPING SEROLOGIC ABO: CPT

## 2020-07-16 PROCEDURE — 83036 HEMOGLOBIN GLYCOSYLATED A1C: CPT

## 2020-07-16 PROCEDURE — 85730 THROMBOPLASTIN TIME PARTIAL: CPT

## 2020-07-16 PROCEDURE — 83880 ASSAY OF NATRIURETIC PEPTIDE: CPT

## 2020-07-16 PROCEDURE — 80053 COMPREHEN METABOLIC PANEL: CPT

## 2020-07-16 PROCEDURE — 36415 COLL VENOUS BLD VENIPUNCTURE: CPT

## 2020-07-16 PROCEDURE — 81001 URINALYSIS AUTO W/SCOPE: CPT

## 2020-07-16 PROCEDURE — 85610 PROTHROMBIN TIME: CPT

## 2020-07-16 PROCEDURE — 86923 COMPATIBILITY TEST ELECTRIC: CPT

## 2020-07-16 PROCEDURE — 85025 COMPLETE CBC W/AUTO DIFF WBC: CPT

## 2020-07-16 PROCEDURE — 87086 URINE CULTURE/COLONY COUNT: CPT

## 2020-07-16 PROCEDURE — 71046 X-RAY EXAM CHEST 2 VIEWS: CPT

## 2020-07-16 RX ORDER — AMIODARONE HYDROCHLORIDE 400 MG/1
400 TABLET ORAL DAILY
Qty: 2 TAB | Refills: 0 | Status: SHIPPED | OUTPATIENT
Start: 2020-07-21 | End: 2020-07-29

## 2020-07-16 RX ORDER — METOPROLOL TARTRATE 25 MG/1
12.5 TABLET, FILM COATED ORAL 2 TIMES DAILY
Qty: 30 TAB | Refills: 2 | Status: ON HOLD | OUTPATIENT
Start: 2020-07-18 | End: 2020-07-28 | Stop reason: SDUPTHER

## 2020-07-16 RX ORDER — CHLORHEXIDINE GLUCONATE 1.2 MG/ML
15 RINSE ORAL 2 TIMES DAILY
Qty: 1 BOTTLE | Refills: 0 | Status: SHIPPED | OUTPATIENT
Start: 2020-07-22 | End: 2020-07-29

## 2020-07-16 RX ORDER — MUPIROCIN 20 MG/G
OINTMENT TOPICAL 2 TIMES DAILY
Qty: 22 G | Refills: 0 | Status: SHIPPED | OUTPATIENT
Start: 2020-07-22 | End: 2020-07-29

## 2020-07-16 NOTE — PERIOP NOTES
Hibiclens/Chlorhexidine    Preventing Infections Before and After  Your Surgery    IMPORTANT INSTRUCTIONS    Please read and follow these instructions carefully. If you are unable to comply with the below instructions your procedure will be cancelled. Every Night for Three (3) nights before your surgery:  1. Shower with an antibacterial soap, such as Dial, or the soap provided at your preassessment appointment. A shower is better than a bath for cleaning your skin. 2. If needed, ask someone to help you reach all areas of your body. Dont forget to clean your belly button with every shower. The night before your surgery: If you lose your Hibiclens/chlorhexidine please contact surgery center or you can purchase it at a local pharmacy  1. On the night before your surgery, shower with an antibacterial soap, such as Dial, or the soap provided at your preassessment appointment. 2. With one packet of Hibiclens/Chlorhexidine in hand, turn water off.  3. Apply Hibiclens antiseptic skin cleanser with a clean, freshly washed washcloth. ? Gently apply to your body from chin to toes (except the genital area) and especially the area(s) where your incision(s) will be. ? Leave Hibiclens/Chlorhexidine on your skin for at least 20 seconds. CAUTION: If needed, Hibiclens/chlorhexidine may be used to clean the folds of skin of the legs (such as in the area of the groin) and on your buttocks and hips. However, do not use Hibiclens/Chlorhexidine above the neck or in the genital area (your bottom) or put inside any area of your body. 4. Turn the water back on and rinse. 5. Dry gently with a clean, freshly washed towel. 6. After your shower, do not use any powder, deodorant, perfumes or lotion. 7. Use clean, freshly washed towels and washcloths every time you shower. 8. Wear clean, freshly washed pajamas to bed the night before surgery. 9. Sleep on clean, freshly washed sheets.   10. Do not allow pets to sleep in your bed with you. The Morning of your surgery:  1. Shower again thoroughly with an antibacterial soap, such as Dial or the soap provided at your preassessment appointment. If needed, ask someone for help to reach all areas of your body. Dont forget to clean your belly button! Rinse. 2. Dry gently with a clean, freshly washed towel. 3. After your shower, do not use any powder, deodorant, perfumes or lotion prior to surgery. 4. Put on clean, freshly washed clothing. Tips to help prevent infections after your surgery:  1. Protect your surgical wound from germs:  ? Hand washing is the most important thing you and your caregivers can do to prevent infections. ? Keep your bandage clean and dry! ? Do not touch your surgical wound. 2. Use clean, freshly washed towels and washcloths every time you shower; do not share bath linens with others. 3. Until your surgical wound is healed, wear clothing and sleep on bed linens each day that are clean and freshly washed. 4. Do not allow pets to sleep in your bed with you or touch your surgical wound. 5. Do not smoke  smoking delays wound healing. This may be a good time to stop smoking. 6. If you have diabetes, it is important for you to manage your blood sugar levels properly before your surgery as well as after your surgery. Poorly managed blood sugar levels slow down wound healing and prevent you from healing completely. If you lose your Hibiclens/chlorhexidine, please call the [facility], or it is available for purchase at your pharmacy.                ___________________      ___________________      ________________  (Signature of Patient)          (Witness)                   (Date and Time)

## 2020-07-16 NOTE — CONSULTS
Anesthesiology Consult   Requested by the surgeon to evaluate for   1. General anesthesia with intubation  2. JEANIE   3. Invasive monitoring and catheters  for proposed procedure. Subjective:      Patient:  Jonathan Yap     Procedure: Procedure(s):  MITRAL VALVE REPLACEMENT REPAIR (MVR) WITH RIGHT  THORACOTOMY, RIGHT GROIN CANNULATION AND DEL NINDO    No Known Allergies    No current facility-administered medications for this encounter. Current Outpatient Medications   Medication Sig    [START ON 2020] mupirocin (BACTROBAN) 2 % ointment by Both Nostrils route two (2) times a day for 7 days.  [START ON 2020] amiodarone (PACERONE) 400 mg tablet Take 1 Tab by mouth daily for 2 days.  [START ON 2020] metoprolol tartrate (LOPRESSOR) 25 mg tablet Take 0.5 Tabs by mouth two (2) times a day for 90 days.  [START ON 2020] chlorhexidine (Peridex) 0.12 % solution Take 15 mL by mouth two (2) times a day for 7 days. Rising and spit    multivit-minerals/FA/lycopene (ONE-A-DAY MEN'S PO) Take  by mouth daily. Past Medical History:   Diagnosis Date    GERD (gastroesophageal reflux disease)     Mitral valve problem        Past Surgical History:   Procedure Laterality Date    HX HEART CATHETERIZATION  2020    HX HEENT      wisdom teeth    HX ORTHOPAEDIC      L arm tendon repair/pinning    HX TONSILLECTOMY         Social History     Tobacco Use    Smoking status: Former Smoker     Last attempt to quit: 3/10/1988     Years since quittin.3    Smokeless tobacco: Never Used   Substance Use Topics    Alcohol use: Yes     Comment: very rare         Anesthetic Problems: none   Denies a history of dysphagia, gastric ulcer, diverticulum, esophageal stricture, stomach surgeries or radiation in the past.    Objective:     Physical Exam:    No data found.     Temp (24hrs), Av.7 °C (98.1 °F), Min:36.7 °C (98.1 °F), Max:36.7 °C (98.1 °F)      General appearance: alert, cooperative, no distress, appears stated age   Airway: Mallampati 2, intact dentition, oral aperture adequate, neck full range of motion. Neck: symmetrical, trachea midline, no JVD   Lungs: clear to auscultation bilaterally   Heart: regular rhythm and normal rate, systolic murmur. Skin:normal  Neurologic: Grossly normal    Labs: No results found for this or any previous visit (from the past 24 hour(s)). Assessment:   No apparent contraindications for general anesthesia with intubation,JEANIE or catheters for proposed surgical procedure. Active Problems:    * No active hospital problems. *      ASA 4      Plan/Recommendations/Medical Decision Making:       Anesthetic Plan: General anesthesia with intubation    Risks and benefits of the anesthetic plan including but not limited to intra and postoperative intubation, invasive monitoring, catheter placement, and JEANIE discussed and agreed upon by the patient. All questions answered. Definitive plan pending day of surgery.        Signed By: Les Rashid DO  Date:           7/16/2020  Time:          10:27 AM

## 2020-07-16 NOTE — PERIOP NOTES
PAYAM Quevedo, came to see pt for pre op teaching. Orders received that pt didn't need repeat EKG, ABG, or PFT's.    [facility] bank called me to make me aware that pts type and cross is being sent to [facility] for further analysis due to the fact that the pt has antibodies in his blood.

## 2020-07-16 NOTE — PERIOP NOTES
[facility]  Preoperative Instructions        Surgery Date 07/23/20          Time of Arrival 0530    1. On the day of your surgery, please report to the Surgical Services Registration Desk and sign in at your designated time. The Surgery Center is located to the right of the Emergency Room. 2. You must have someone with you to drive you home. You should not drive a car for 24 hours following surgery. Please make arrangements for a friend or family member to stay with you for the first 24 hours after your surgery. 3. Do not have anything to eat or drink (including water, gum, mints, coffee, juice) after midnight ?07/22/20? Cate Plaster ? This may not apply to medications prescribed by your physician. ?(Please note below the special instructions with medications to take the morning of your procedure.)    4. We recommend you do not drink any alcoholic beverages for 24 hours before and after your surgery. 5. Contact your surgeons office for instructions on the following medications: non-steroidal anti-inflammatory drugs (i.e. Advil, Aleve), vitamins, and supplements. (Some surgeons will want you to stop these medications prior to surgery and others may allow you to take them)  **If you are currently taking Plavix, Coumadin, Aspirin and/or other blood-thinning agents, contact your surgeon for instructions. ** Your surgeon will partner with the physician prescribing these medications to determine if it is safe to stop or if you need to continue taking. Please do not stop taking these medications without instructions from your surgeon    6. Wear comfortable clothes. Wear glasses instead of contacts. Do not bring any money or jewelry. Please bring picture ID, insurance card, and any prearranged co-payment or hospital payment. Do not wear make-up, particularly mascara the morning of your surgery. Do not wear nail polish, particularly if you are having foot /hand surgery.   Wear your hair loose or down, no ponytails, buns, mackenzie pins or clips. All body piercings must be removed. Please shower with antibacterial soap for three consecutive days before and on the morning of surgery, but do not apply any lotions, powders or deodorants after the shower on the day of surgery. Please use a fresh towels after each shower. Please sleep in clean clothes and change bed linens the night before surgery. Please do not shave for 48 hours prior to surgery. Shaving of the face is acceptable. 7. You should understand that if you do not follow these instructions your surgery may be cancelled. If your physical condition changes (I.e. fever, cold or flu) please contact your surgeon as soon as possible. 8. It is important that you be on time. If a situation occurs where you may be late, please call [phone] (OR Holding Area). 9. If you have any questions and or problems, please call [phone] (Pre-admission Testing). 10. Your surgery time may be subject to change. You will receive a phone call the evening prior if your time changes. 11.  If having outpatient surgery, you must have someone to drive you here, stay with you during the duration of your stay, and to drive you home at time of discharge. Special Instructions: bring Incentive spirometer with you to the hospital.     TAKE ALL MEDICATIONS DAY OF SURGERY EXCEPT:no medications except for bactroban ointment and peridex solution and bring those with you to the hospital.      I understand a pre-operative phone call will be made to verify my surgery time. In the event that I am not available, I give permission for a message to be left on my answering service and/or with another person?   Yes [phone]         ___________________      __________   _________    (Signature of Patient)             (Witness)                (Date and Time)

## 2020-07-16 NOTE — H&P
Cardiac Surgery History and Physical        No changes in finding from below since last office visit.        Diagnosis/Reason for Consultation: The primary encounter diagnosis was Nonrheumatic mitral valve regurgitation. A diagnosis of Gastroesophageal reflux disease without esophagitis was also pertinent to this visit.     Problem List: MR, Atypical Chest Pain, Dyspnea, Murmur, Obesity, GERD      HPI: 49 y. o.  male who is a [facility] PMHx of MR, Obesity,and Atypical Chest Pain that is referred to the [address] [place] [address] interventional evaluation of his severe mitral regurgitation. .Mr. Sara Olvera has complaints of fatigue,chest pain since he was a child, and dyspnea on exertion for about six months. He has undergone extensive cardiac work up with Dr. Shar Amezcua including TTE, JEANIE, and Cardiac Cath. He was noted to have Severe Mitral Regurgitation and was referred for surgical consultation.     He is not on any medication currently except multivitamin. He is a nonsmoker and rarely drinks alcohol.     NYHA Classification: Class II              Class II (Mild): Slight limitation of physical activity.  Comfortable at rest, but ordinary physical activity results in fatigue, palpitation, or dyspnea.                   Angina Classification: Class 0              Class 0: No symptoms             Past Medical History:   Diagnosis Date    GERD (gastroesophageal reflux disease)        Endocarditis: no  Pulmonary HTN: mild            Greater than 2/3 systemic: no  Immunocompromised/Steroids: no  Heart Failure Admission w/in past year:  no  Heart Failure Admission w/in past 2 weeks: no  Liver Dz/Cirrhosis: no  If yes, MELD score:  Na     Last Dental Visit:  11/2019   Any dental pain/concerns: na        Past Surgical History:   Procedure Laterality Date    HX ORTHOPAEDIC         L arm tendon repair      Social History              Tobacco Use    Smoking status: Former Smoker    Smokeless tobacco: Never Used   Substance Use Topics    Alcohol use: No                Family History   Problem Relation Age of Onset    Cancer Mother      Diabetes Mother      Diabetes Brother      Headache Maternal Grandfather      Diabetes Maternal Grandfather                      Prior to Admission medications    Medication Sig Start Date End Date Taking? Authorizing Provider   multivitamin, tx-iron-ca-min (THERA-M W/ IRON) 9 mg iron-400 mcg tab tablet Take 1 Tab by mouth daily.       Provider, Historical       No Known Allergies     Current Medications:               Current Outpatient Medications   Medication Sig Dispense Refill    multivitamin, tx-iron-ca-min (THERA-M W/ IRON) 9 mg iron-400 mcg tab tablet Take 1 Tab by mouth daily.             Vitals: Blood pressure 132/78, pulse 83, temperature 98.1 °F (36.7 °C), temperature source Oral, height 6' 1\" (1.854 m), weight 230 lb (104.3 kg), SpO2 98 %.         Allergies: has No Known Allergies.     Father  in 46s of cancer  Mother ovarian cancer  Grandfather CAD     Review of Systems: Pertinent Positives per HPI   []?? Unable to obtain  ROS due to  []? ?mental status change  []? ?sedated   []? ?intubated   [x]? ? Total of 13 systems reviewed as follows:  Constitutional: Positive for fatigue,Negative fever, negative chills  Eyes:               Negative for amauroses fugax  ENT:                Negative sore throat,oral absecess  Endocrine        Negative for thyroid replacement Rx; goiter; DM  Respiratory:     Negative chronic cough,sputum production  Cards:              Positive MEDINA,atypical chest pain,Negative for palpitations, lower extremity edema, varicosities, claudication  GI:                   Negative for dysphagia, bleeding, nausea, vomiting, diarrhea, and abdominal pain  Genitourinary: Negative for frequency, dysuria, BPH   Integument:     Negative for rash and pruritus  Hematologic:   Negative for easy bruising; bleeding dyscarsia  Musculoskel:   Negative for muscle weakness inhibiting ambulation  Neurological:   Negative for stroke, TIA, syncope, dizziness  Behavl/Psych: Negative for feelings of anxiety, depression      Cardiovascular Testing:   EK2019  NSR, QRSd 110, T Wave inversion, Angelia 130ms     TTE: 10/14/2019  LVEF 60%, 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 EF of 65%. 2) Normal RV size and function. 3) Structurally normal aortic valve; No aortic stenosis or regurgitation. 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  No mitral stenosis. 5) Structurally normal tricuspid valve; No tricuspid stenosis; trivial tricuspid regurgitation. 6) Pulmonary valve appears structurally normal.  7) LOREN is without thrombus. 8) No intra-cardiac shunt is seen on color doppler or agitated saline injection. 9) No intra-cardiac thrombus or vegetation is seen. 10) The visualized portions of the ascending aorta, aortic arch, and descending aorta appear normal.     Conclusion: MV and MR as above.        Cardiac catheterization: 2020  1. Normal LV function. 2. No CAD. 3. Severe MR     PLAN: Surgery consult for mitral valve repair.            Coronary Findings      Diagnostic   Dominance: Right   Left Main   The vessel is large. The vessel is angiographically normal.   Left Anterior Descending   The proximal segment of the vessel is large. The middle segment of the vessel is moderate in size. The distal segment of the vessel is moderate in size. The vessel is angiographically normal. 3 small diagonals. Ramus Intermedius   The vessel is moderate in size.  The vessel is angiographically normal. Left Circumflex   The vessel is large. Trivial high OM then circ becomes a large OM and trivial AV groove circ. Right Coronary Artery   The vessel is moderate in size. The vessel is angiographically normal. Small PDA and postero-lateral vessels. Intervention      No interventions have been documented. Measurements      EDP: 11                Left Heart      Left Ventricle The left ventricular size is in the upper limits of normal. The estimated EF = 60 - 65%. There is severe (4+) mitral regurgitation. Aortic Valve There is no aortic valve stenosis.      PA and Lateral: 1- WNL     Carotid Dopplers: not done     PFTs: FEV1/Predicted:  Not done  Normal FEV1 > 1 Liter, Predicted = 100%, DLCO > 80%                          Mild Lung Disease (60-70% Predicted)                          Moderate Lung Disease (50-55% Predicted)                          Severe Lung Disease (< 50% Predicted or PO2 < 60 or pCO2>50 on RA)        Physical Exam:  General: Well nourished well groomed male appearing stated age accompanied by his wife  Neuro: A&OX3. CASTILLO. PERRL. Steady unassisted gait  Head:Normocephalic. Atraumatic. Symmetrical  Neck: Trachea Midline  Resp: CTA B. No Adv BS/cough/sputum/tachypnea with seated conversation  CV: S1S2 RRR. ANTHONY II/VI. No JVD/carotid bruits. Pink/warm/dry extremities. No LE peripheral edema  GI:Benign ab. Soft. NT/ND.  Active BS  : Voids  Integ: No obvious s/s of infection or breakdown  Musculo/Skeletal: FROM in all major joints. normal muscle tone        STS 2.9 Risk Score / Predicted 30 day mortality: - calculations scanned into EMR  Procedure: MV Repair CALCULATE   Risk of Mortality:  0.364%    Renal Failure:  0.874%    Permanent Stroke:  0.411%    Prolonged Ventilation:  2.824%    DSW Infection:  0.038%    Reoperation:  2.793%    Morbidity or Mortality:  5.453%    Short Length of Stay:  69.222%    Long Length of Stay:  1.399%      Procedure: Isolated MVR CALCULATE   Risk of Mortality: 0.915%    Renal Failure:  1.817%    Permanent Stroke:  0.684%    Prolonged Ventilation:  4.995%    DSW Infection:  0.073%    Reoperation:  4.437%    Morbidity or Mortality:  8.766%    Short Length of Stay:  41.654%    Long Length of Stay:  3.678%         PLAN:     PAT 7/16/20  MVR/Thoracotomy 7/23/20   No meds to stop     Preop Rx: Rx for Bactroban, Peridex and Amiodarone

## 2020-07-17 LAB
BACTERIA SPEC CULT: NORMAL
SERVICE CMNT-IMP: NORMAL
SERVICE CMNT-IMP: NORMAL

## 2020-07-19 ENCOUNTER — HOSPITAL ENCOUNTER (OUTPATIENT)
Dept: PREADMISSION TESTING | Age: 50
Discharge: HOME | End: 2020-07-19
Payer: COMMERCIAL

## 2020-07-19 PROCEDURE — 87635 SARS-COV-2 COVID-19 AMP PRB: CPT

## 2020-07-20 LAB
SARS-COV-2, COV2NT: NOT DETECTED
SOURCE, COVRS: NORMAL
SPECIMEN SOURCE, FCOV2M: NORMAL

## 2020-07-20 RX ORDER — SODIUM CHLORIDE, SODIUM LACTATE, POTASSIUM CHLORIDE, CALCIUM CHLORIDE 600; 310; 30; 20 MG/100ML; MG/100ML; MG/100ML; MG/100ML
25 INJECTION, SOLUTION INTRAVENOUS CONTINUOUS
Status: CANCELLED | OUTPATIENT
Start: 2020-07-23

## 2020-07-22 PROBLEM — I34.0 MITRAL REGURGITATION: Status: ACTIVE | Noted: 2020-07-22

## 2020-07-22 RX ORDER — PROTAMINE SULFATE 10 MG/ML
250 INJECTION, SOLUTION INTRAVENOUS
Status: DISCONTINUED | OUTPATIENT
Start: 2020-07-23 | End: 2020-07-23

## 2020-07-22 RX ORDER — DOBUTAMINE HYDROCHLORIDE 200 MG/100ML
0-10 INJECTION INTRAVENOUS
Status: DISCONTINUED | OUTPATIENT
Start: 2020-07-23 | End: 2020-07-24

## 2020-07-22 RX ORDER — BUPIVACAINE HYDROCHLORIDE 5 MG/ML
300 INJECTION, SOLUTION EPIDURAL; INTRACAUDAL ONCE
Status: DISCONTINUED | OUTPATIENT
Start: 2020-07-22 | End: 2020-07-22

## 2020-07-22 RX ORDER — DOPAMINE HYDROCHLORIDE 320 MG/100ML
5-20 INJECTION, SOLUTION INTRAVENOUS
Status: DISCONTINUED | OUTPATIENT
Start: 2020-07-23 | End: 2020-07-23

## 2020-07-22 RX ORDER — NOREPINEPHRINE BITARTRATE/D5W 8 MG/250ML
2-16 PLASTIC BAG, INJECTION (ML) INTRAVENOUS
Status: DISCONTINUED | OUTPATIENT
Start: 2020-07-23 | End: 2020-07-23

## 2020-07-22 RX ORDER — DESMOPRESSIN ACETATE 4 UG/ML
2 INJECTION, SOLUTION INTRAVENOUS; SUBCUTANEOUS ONCE
Status: COMPLETED | OUTPATIENT
Start: 2020-07-23 | End: 2020-07-23

## 2020-07-22 RX ORDER — NITROGLYCERIN 20 MG/100ML
0-200 INJECTION INTRAVENOUS
Status: DISCONTINUED | OUTPATIENT
Start: 2020-07-23 | End: 2020-07-23

## 2020-07-22 RX ORDER — MAGNESIUM SULFATE HEPTAHYDRATE 40 MG/ML
2 INJECTION, SOLUTION INTRAVENOUS ONCE
Status: COMPLETED | OUTPATIENT
Start: 2020-07-23 | End: 2020-07-23

## 2020-07-22 RX ORDER — POTASSIUM CHLORIDE 29.8 MG/ML
20 INJECTION INTRAVENOUS ONCE
Status: DISCONTINUED | OUTPATIENT
Start: 2020-07-23 | End: 2020-07-23

## 2020-07-23 ENCOUNTER — HOSPITAL ENCOUNTER (INPATIENT)
Age: 50
LOS: 6 days | Discharge: HOME HEALTH | DRG: 220 | End: 2020-07-29
Attending: THORACIC SURGERY (CARDIOTHORACIC VASCULAR SURGERY) | Admitting: THORACIC SURGERY (CARDIOTHORACIC VASCULAR SURGERY)
Payer: COMMERCIAL

## 2020-07-23 ENCOUNTER — ANESTHESIA (OUTPATIENT)
Dept: CARDIOTHORACIC SURGERY | Age: 50
DRG: 220 | End: 2020-07-23
Payer: COMMERCIAL

## 2020-07-23 ENCOUNTER — APPOINTMENT (OUTPATIENT)
Dept: GENERAL RADIOLOGY | Age: 50
DRG: 220 | End: 2020-07-23
Attending: NURSE PRACTITIONER
Payer: COMMERCIAL

## 2020-07-23 ENCOUNTER — HOSPITAL ENCOUNTER (OUTPATIENT)
Dept: NON INVASIVE DIAGNOSTICS | Age: 50
Discharge: HOME | End: 2020-07-23
Attending: THORACIC SURGERY (CARDIOTHORACIC VASCULAR SURGERY)

## 2020-07-23 DIAGNOSIS — Z98.890 S/P MVR (MITRAL VALVE REPAIR): Primary | ICD-10-CM

## 2020-07-23 LAB
ADMINISTERED INITIALS, ADMINIT: NORMAL
ALBUMIN SERPL-MCNC: 3.1 G/DL (ref 3.5–5)
ALBUMIN SERPL-MCNC: 3.9 G/DL (ref 3.5–5)
ALBUMIN/GLOB SERPL: 1.4 {RATIO} (ref 1.1–2.2)
ALBUMIN/GLOB SERPL: 1.8 {RATIO} (ref 1.1–2.2)
ALP SERPL-CCNC: 49 U/L (ref 45–117)
ALP SERPL-CCNC: 54 U/L (ref 45–117)
ALT SERPL-CCNC: 34 U/L (ref 12–78)
ALT SERPL-CCNC: 36 U/L (ref 12–78)
ANION GAP SERPL CALC-SCNC: 6 MMOL/L (ref 5–15)
ANION GAP SERPL CALC-SCNC: 6 MMOL/L (ref 5–15)
APTT PPP: 28.3 SEC (ref 22.1–32)
ARTERIAL PATENCY WRIST A: ABNORMAL
ARTERIAL PATENCY WRIST A: ABNORMAL
AST SERPL-CCNC: 90 U/L (ref 15–37)
AST SERPL-CCNC: 99 U/L (ref 15–37)
BASE DEFICIT BLD-SCNC: 3 MMOL/L
BASE DEFICIT BLD-SCNC: 5 MMOL/L
BASOPHILS # BLD: 0 K/UL (ref 0–0.1)
BASOPHILS NFR BLD: 0 % (ref 0–1)
BDY SITE: ABNORMAL
BDY SITE: ABNORMAL
BILIRUB SERPL-MCNC: 2 MG/DL (ref 0.2–1)
BILIRUB SERPL-MCNC: 3.2 MG/DL (ref 0.2–1)
BUN SERPL-MCNC: 14 MG/DL (ref 6–20)
BUN SERPL-MCNC: 16 MG/DL (ref 6–20)
BUN/CREAT SERPL: 11 (ref 12–20)
BUN/CREAT SERPL: 12 (ref 12–20)
CA-I BLD-SCNC: 1.29 MMOL/L (ref 1.12–1.32)
CA-I BLD-SCNC: 1.32 MMOL/L (ref 1.12–1.32)
CALCIUM SERPL-MCNC: 8.5 MG/DL (ref 8.5–10.1)
CALCIUM SERPL-MCNC: 8.5 MG/DL (ref 8.5–10.1)
CHLORIDE SERPL-SCNC: 113 MMOL/L (ref 97–108)
CHLORIDE SERPL-SCNC: 113 MMOL/L (ref 97–108)
CO2 SERPL-SCNC: 23 MMOL/L (ref 21–32)
CO2 SERPL-SCNC: 24 MMOL/L (ref 21–32)
CREAT SERPL-MCNC: 1.26 MG/DL (ref 0.7–1.3)
CREAT SERPL-MCNC: 1.39 MG/DL (ref 0.7–1.3)
D50 ADMINISTERED, D50ADM: 0 ML
D50 ORDER, D50ORD: 0 ML
DIFFERENTIAL METHOD BLD: ABNORMAL
EOSINOPHIL # BLD: 0 K/UL (ref 0–0.4)
EOSINOPHIL NFR BLD: 0 % (ref 0–7)
ERYTHROCYTE [DISTWIDTH] IN BLOOD BY AUTOMATED COUNT: 12.5 % (ref 11.5–14.5)
GAS FLOW.O2 O2 DELIVERY SYS: ABNORMAL L/MIN
GAS FLOW.O2 O2 DELIVERY SYS: ABNORMAL L/MIN
GAS FLOW.O2 SETTING OXYMISER: 18 BPM
GLOBULIN SER CALC-MCNC: 2.2 G/DL (ref 2–4)
GLOBULIN SER CALC-MCNC: 2.2 G/DL (ref 2–4)
GLSCOM COMMENTS: NORMAL
GLUCOSE BLD STRIP.AUTO-MCNC: 102 MG/DL (ref 65–100)
GLUCOSE BLD STRIP.AUTO-MCNC: 102 MG/DL (ref 65–100)
GLUCOSE BLD STRIP.AUTO-MCNC: 103 MG/DL (ref 65–100)
GLUCOSE BLD STRIP.AUTO-MCNC: 114 MG/DL (ref 65–100)
GLUCOSE BLD STRIP.AUTO-MCNC: 115 MG/DL (ref 65–100)
GLUCOSE BLD STRIP.AUTO-MCNC: 121 MG/DL (ref 65–100)
GLUCOSE BLD STRIP.AUTO-MCNC: 138 MG/DL (ref 65–100)
GLUCOSE BLD STRIP.AUTO-MCNC: 93 MG/DL (ref 65–100)
GLUCOSE BLD STRIP.AUTO-MCNC: 93 MG/DL (ref 65–100)
GLUCOSE BLD STRIP.AUTO-MCNC: 97 MG/DL (ref 65–100)
GLUCOSE SERPL-MCNC: 108 MG/DL (ref 65–100)
GLUCOSE SERPL-MCNC: 123 MG/DL (ref 65–100)
GLUCOSE, GLC: 102 MG/DL
GLUCOSE, GLC: 102 MG/DL
GLUCOSE, GLC: 103 MG/DL
GLUCOSE, GLC: 108 MG/DL
GLUCOSE, GLC: 114 MG/DL
GLUCOSE, GLC: 115 MG/DL
GLUCOSE, GLC: 121 MG/DL
GLUCOSE, GLC: 127 MG/DL
GLUCOSE, GLC: 134 MG/DL
GLUCOSE, GLC: 138 MG/DL
GLUCOSE, GLC: 146 MG/DL
GLUCOSE, GLC: 93 MG/DL
GLUCOSE, GLC: 97 MG/DL
GLUCOSE, GLC: 97 MG/DL
HCO3 BLD-SCNC: 19.8 MMOL/L (ref 22–26)
HCO3 BLD-SCNC: 22.7 MMOL/L (ref 22–26)
HCT VFR BLD AUTO: 37.5 % (ref 36.6–50.3)
HCT VFR BLD AUTO: 39.9 % (ref 36.6–50.3)
HGB BLD-MCNC: 13.1 G/DL (ref 12.1–17)
HGB BLD-MCNC: 13.9 G/DL (ref 12.1–17)
HIGH TARGET, HITG: 130 MG/DL
HIGH TARGET, HITG: 140 MG/DL
IMM GRANULOCYTES # BLD AUTO: 0.1 K/UL (ref 0–0.04)
IMM GRANULOCYTES NFR BLD AUTO: 1 % (ref 0–0.5)
INR PPP: 1.1 (ref 0.9–1.1)
INSULIN ADMINSTERED, INSADM: 0.7 UNITS/HOUR
INSULIN ADMINSTERED, INSADM: 1.1 UNITS/HOUR
INSULIN ADMINSTERED, INSADM: 1.2 UNITS/HOUR
INSULIN ADMINSTERED, INSADM: 1.3 UNITS/HOUR
INSULIN ADMINSTERED, INSADM: 1.6 UNITS/HOUR
INSULIN ADMINSTERED, INSADM: 1.9 UNITS/HOUR
INSULIN ADMINSTERED, INSADM: 2.2 UNITS/HOUR
INSULIN ADMINSTERED, INSADM: 2.3 UNITS/HOUR
INSULIN ADMINSTERED, INSADM: 2.7 UNITS/HOUR
INSULIN ADMINSTERED, INSADM: 3.4 UNITS/HOUR
INSULIN ORDER, INSORD: 0.7 UNITS/HOUR
INSULIN ORDER, INSORD: 1.1 UNITS/HOUR
INSULIN ORDER, INSORD: 1.2 UNITS/HOUR
INSULIN ORDER, INSORD: 1.3 UNITS/HOUR
INSULIN ORDER, INSORD: 1.6 UNITS/HOUR
INSULIN ORDER, INSORD: 1.9 UNITS/HOUR
INSULIN ORDER, INSORD: 2.2 UNITS/HOUR
INSULIN ORDER, INSORD: 2.3 UNITS/HOUR
INSULIN ORDER, INSORD: 2.7 UNITS/HOUR
INSULIN ORDER, INSORD: 3.4 UNITS/HOUR
LOW TARGET, LOT: 100 MG/DL
LOW TARGET, LOT: 95 MG/DL
LYMPHOCYTES # BLD: 1.2 K/UL (ref 0.8–3.5)
LYMPHOCYTES NFR BLD: 7 % (ref 12–49)
MAGNESIUM SERPL-MCNC: 2.8 MG/DL (ref 1.6–2.4)
MAGNESIUM SERPL-MCNC: 3.1 MG/DL (ref 1.6–2.4)
MCH RBC QN AUTO: 30.7 PG (ref 26–34)
MCHC RBC AUTO-ENTMCNC: 34.8 G/DL (ref 30–36.5)
MCV RBC AUTO: 88.1 FL (ref 80–99)
MINUTES UNTIL NEXT BG, NBG: 60 MIN
MONOCYTES # BLD: 0.8 K/UL (ref 0–1)
MONOCYTES NFR BLD: 5 % (ref 5–13)
MULTIPLIER, MUL: 0.02
MULTIPLIER, MUL: 0.03
MULTIPLIER, MUL: 0.04
NEUTS SEG # BLD: 14.9 K/UL (ref 1.8–8)
NEUTS SEG NFR BLD: 87 % (ref 32–75)
NRBC # BLD: 0 K/UL (ref 0–0.01)
NRBC BLD-RTO: 0 PER 100 WBC
O2/TOTAL GAS SETTING VFR VENT: 50 %
O2/TOTAL GAS SETTING VFR VENT: 50 %
ORDER INITIALS, ORDINIT: NORMAL
PCO2 BLD: 32.4 MMHG (ref 35–45)
PCO2 BLD: 42.6 MMHG (ref 35–45)
PEEP RESPIRATORY: 6 CMH2O
PEEP RESPIRATORY: 6 CMH2O
PH BLD: 7.34 [PH] (ref 7.35–7.45)
PH BLD: 7.39 [PH] (ref 7.35–7.45)
PLATELET # BLD AUTO: 141 K/UL (ref 150–400)
PMV BLD AUTO: 10.2 FL (ref 8.9–12.9)
PO2 BLD: 146 MMHG (ref 80–100)
PO2 BLD: 165 MMHG (ref 80–100)
POTASSIUM SERPL-SCNC: 4.1 MMOL/L (ref 3.5–5.1)
POTASSIUM SERPL-SCNC: 4.3 MMOL/L (ref 3.5–5.1)
PRESSURE SUPPORT SETTING VENT: 6 CMH2O
PRESSURE SUPPORT SETTING VENT: 6 CMH2O
PROT SERPL-MCNC: 5.3 G/DL (ref 6.4–8.2)
PROT SERPL-MCNC: 6.1 G/DL (ref 6.4–8.2)
PROTHROMBIN TIME: 11.7 SEC (ref 9–11.1)
RBC # BLD AUTO: 4.53 M/UL (ref 4.1–5.7)
SAO2 % BLD: 99 % (ref 92–97)
SAO2 % BLD: 99 % (ref 92–97)
SERVICE CMNT-IMP: ABNORMAL
SERVICE CMNT-IMP: NORMAL
SODIUM SERPL-SCNC: 142 MMOL/L (ref 136–145)
SODIUM SERPL-SCNC: 143 MMOL/L (ref 136–145)
SPECIMEN TYPE: ABNORMAL
SPECIMEN TYPE: ABNORMAL
THERAPEUTIC RANGE,PTTT: NORMAL SECS (ref 58–77)
TOTAL RESP. RATE, ITRR: 18
TOTAL RESP. RATE, ITRR: 8
VENTILATION MODE VENT: ABNORMAL
VENTILATION MODE VENT: ABNORMAL
VT SETTING VENT: 600 ML
WBC # BLD AUTO: 17 K/UL (ref 4.1–11.1)

## 2020-07-23 PROCEDURE — 77030034936 HC DEV MIN COR-KNOT KT LSIS -F: Performed by: THORACIC SURGERY (CARDIOTHORACIC VASCULAR SURGERY)

## 2020-07-23 PROCEDURE — 77030020263 HC SOL INJ SOD CL0.9% LFCR 1000ML: Performed by: THORACIC SURGERY (CARDIOTHORACIC VASCULAR SURGERY)

## 2020-07-23 PROCEDURE — C1751 CATH, INF, PER/CENT/MIDLINE: HCPCS | Performed by: ANESTHESIOLOGY

## 2020-07-23 PROCEDURE — 77030019702 HC WRP THER MENM -C: Performed by: THORACIC SURGERY (CARDIOTHORACIC VASCULAR SURGERY)

## 2020-07-23 PROCEDURE — 94002 VENT MGMT INPAT INIT DAY: CPT

## 2020-07-23 PROCEDURE — 85730 THROMBOPLASTIN TIME PARTIAL: CPT

## 2020-07-23 PROCEDURE — 77030006247 HC LD PCMKR MYOCRD BPLR TEMP MEDT -B: Performed by: THORACIC SURGERY (CARDIOTHORACIC VASCULAR SURGERY)

## 2020-07-23 PROCEDURE — 77030002937 HC SUT MERS J&J -B: Performed by: THORACIC SURGERY (CARDIOTHORACIC VASCULAR SURGERY)

## 2020-07-23 PROCEDURE — 77030014008 HC SPNG HEMSTAT J&J -C: Performed by: THORACIC SURGERY (CARDIOTHORACIC VASCULAR SURGERY)

## 2020-07-23 PROCEDURE — 77030010800: Performed by: THORACIC SURGERY (CARDIOTHORACIC VASCULAR SURGERY)

## 2020-07-23 PROCEDURE — 77030013798 HC KT TRNSDUC PRSSR EDWD -B: Performed by: THORACIC SURGERY (CARDIOTHORACIC VASCULAR SURGERY)

## 2020-07-23 PROCEDURE — 77030008756 HC TU IRR SUC STRY -B: Performed by: THORACIC SURGERY (CARDIOTHORACIC VASCULAR SURGERY)

## 2020-07-23 PROCEDURE — 74011250636 HC RX REV CODE- 250/636: Performed by: NURSE PRACTITIONER

## 2020-07-23 PROCEDURE — 77030013798 HC KT TRNSDUC PRSSR EDWD -B: Performed by: ANESTHESIOLOGY

## 2020-07-23 PROCEDURE — 83735 ASSAY OF MAGNESIUM: CPT

## 2020-07-23 PROCEDURE — 77030003029 HC SUT VCRL J&J -B: Performed by: THORACIC SURGERY (CARDIOTHORACIC VASCULAR SURGERY)

## 2020-07-23 PROCEDURE — 02UG0JZ SUPPLEMENT MITRAL VALVE WITH SYNTHETIC SUBSTITUTE, OPEN APPROACH: ICD-10-PCS | Performed by: THORACIC SURGERY (CARDIOTHORACIC VASCULAR SURGERY)

## 2020-07-23 PROCEDURE — 71045 X-RAY EXAM CHEST 1 VIEW: CPT

## 2020-07-23 PROCEDURE — 74011250636 HC RX REV CODE- 250/636: Performed by: NURSE ANESTHETIST, CERTIFIED REGISTERED

## 2020-07-23 PROCEDURE — 77030020089 HC KT PERC FEM ART MEDT -C: Performed by: THORACIC SURGERY (CARDIOTHORACIC VASCULAR SURGERY)

## 2020-07-23 PROCEDURE — 74011000250 HC RX REV CODE- 250: Performed by: PHYSICIAN ASSISTANT

## 2020-07-23 PROCEDURE — 77030020140: Performed by: THORACIC SURGERY (CARDIOTHORACIC VASCULAR SURGERY)

## 2020-07-23 PROCEDURE — 77030041244 HC CBL PACE EXT TEMP REMG -B: Performed by: THORACIC SURGERY (CARDIOTHORACIC VASCULAR SURGERY)

## 2020-07-23 PROCEDURE — 77030018793 HC ORG SUT GAB FRAT TELE -B: Performed by: THORACIC SURGERY (CARDIOTHORACIC VASCULAR SURGERY)

## 2020-07-23 PROCEDURE — 77030016154: Performed by: THORACIC SURGERY (CARDIOTHORACIC VASCULAR SURGERY)

## 2020-07-23 PROCEDURE — 77030019908 HC STETH ESOPH SIMS -A: Performed by: ANESTHESIOLOGY

## 2020-07-23 PROCEDURE — 77030002986 HC SUT PROL J&J -A: Performed by: THORACIC SURGERY (CARDIOTHORACIC VASCULAR SURGERY)

## 2020-07-23 PROCEDURE — 74011250636 HC RX REV CODE- 250/636: Performed by: PHYSICIAN ASSISTANT

## 2020-07-23 PROCEDURE — 77030040504 HC DRN WND MDII -B: Performed by: THORACIC SURGERY (CARDIOTHORACIC VASCULAR SURGERY)

## 2020-07-23 PROCEDURE — 77030005326 HC CATH PAIN PMP/Q AVNM -C: Performed by: THORACIC SURGERY (CARDIOTHORACIC VASCULAR SURGERY)

## 2020-07-23 PROCEDURE — 77030038815: Performed by: THORACIC SURGERY (CARDIOTHORACIC VASCULAR SURGERY)

## 2020-07-23 PROCEDURE — 74011000258 HC RX REV CODE- 258: Performed by: THORACIC SURGERY (CARDIOTHORACIC VASCULAR SURGERY)

## 2020-07-23 PROCEDURE — 77030034689 HC VASC DIL KT W/NDL LIVA -C: Performed by: THORACIC SURGERY (CARDIOTHORACIC VASCULAR SURGERY)

## 2020-07-23 PROCEDURE — 77030019579 HC CBL PACE DISP REMG -B: Performed by: THORACIC SURGERY (CARDIOTHORACIC VASCULAR SURGERY)

## 2020-07-23 PROCEDURE — 74011000250 HC RX REV CODE- 250: Performed by: NURSE PRACTITIONER

## 2020-07-23 PROCEDURE — C1751 CATH, INF, PER/CENT/MIDLINE: HCPCS | Performed by: THORACIC SURGERY (CARDIOTHORACIC VASCULAR SURGERY)

## 2020-07-23 PROCEDURE — P9045 ALBUMIN (HUMAN), 5%, 250 ML: HCPCS | Performed by: NURSE ANESTHETIST, CERTIFIED REGISTERED

## 2020-07-23 PROCEDURE — 77030011640 HC PAD GRND REM COVD -A: Performed by: THORACIC SURGERY (CARDIOTHORACIC VASCULAR SURGERY)

## 2020-07-23 PROCEDURE — B246ZZ4 ULTRASONOGRAPHY OF RIGHT AND LEFT HEART, TRANSESOPHAGEAL: ICD-10-PCS | Performed by: ANESTHESIOLOGY

## 2020-07-23 PROCEDURE — 77030011264 HC ELECTRD BLD EXT COVD -A: Performed by: THORACIC SURGERY (CARDIOTHORACIC VASCULAR SURGERY)

## 2020-07-23 PROCEDURE — 77030010507 HC ADH SKN DERMBND J&J -B: Performed by: THORACIC SURGERY (CARDIOTHORACIC VASCULAR SURGERY)

## 2020-07-23 PROCEDURE — 85025 COMPLETE CBC W/AUTO DIFF WBC: CPT

## 2020-07-23 PROCEDURE — 77030020061 HC IV BLD WRMR ADMIN SET 3M -B: Performed by: ANESTHESIOLOGY

## 2020-07-23 PROCEDURE — 82803 BLOOD GASES ANY COMBINATION: CPT

## 2020-07-23 PROCEDURE — 77030019905 HC CATH URETH INTMIT MDII -A: Performed by: THORACIC SURGERY (CARDIOTHORACIC VASCULAR SURGERY)

## 2020-07-23 PROCEDURE — 77030018835 HC SOL IRR LR ICUM -A: Performed by: THORACIC SURGERY (CARDIOTHORACIC VASCULAR SURGERY)

## 2020-07-23 PROCEDURE — 77010033678 HC OXYGEN DAILY

## 2020-07-23 PROCEDURE — 77030027344 HC BND ANULPLST MTRL CG MEDT -H: Performed by: THORACIC SURGERY (CARDIOTHORACIC VASCULAR SURGERY)

## 2020-07-23 PROCEDURE — 77030002912 HC SUT ETHBND J&J -A: Performed by: THORACIC SURGERY (CARDIOTHORACIC VASCULAR SURGERY)

## 2020-07-23 PROCEDURE — 85610 PROTHROMBIN TIME: CPT

## 2020-07-23 PROCEDURE — 85018 HEMOGLOBIN: CPT

## 2020-07-23 PROCEDURE — 74011000258 HC RX REV CODE- 258: Performed by: NURSE PRACTITIONER

## 2020-07-23 PROCEDURE — 77030008684 HC TU ET CUF COVD -B: Performed by: NURSE ANESTHETIST, CERTIFIED REGISTERED

## 2020-07-23 PROCEDURE — 77030008771 HC TU NG SALEM SUMP -A: Performed by: ANESTHESIOLOGY

## 2020-07-23 PROCEDURE — 77030037878 HC DRSG MEPILEX >48IN BORD MOLN -B: Performed by: THORACIC SURGERY (CARDIOTHORACIC VASCULAR SURGERY)

## 2020-07-23 PROCEDURE — 77030022521 HC CATH PERF VENT EDWD -B: Performed by: THORACIC SURGERY (CARDIOTHORACIC VASCULAR SURGERY)

## 2020-07-23 PROCEDURE — 77030018729 HC ELECTRD DEFIB PAD CARD -B: Performed by: THORACIC SURGERY (CARDIOTHORACIC VASCULAR SURGERY)

## 2020-07-23 PROCEDURE — 77030005513 HC CATH URETH FOL11 MDII -B: Performed by: THORACIC SURGERY (CARDIOTHORACIC VASCULAR SURGERY)

## 2020-07-23 PROCEDURE — 5A1221Z PERFORMANCE OF CARDIAC OUTPUT, CONTINUOUS: ICD-10-PCS | Performed by: THORACIC SURGERY (CARDIOTHORACIC VASCULAR SURGERY)

## 2020-07-23 PROCEDURE — 77030020256 HC SOL INJ NACL 0.9%  500ML: Performed by: THORACIC SURGERY (CARDIOTHORACIC VASCULAR SURGERY)

## 2020-07-23 PROCEDURE — 77030002973 HC SUT PLEDG CV SFT OVL TELE -B: Performed by: THORACIC SURGERY (CARDIOTHORACIC VASCULAR SURGERY)

## 2020-07-23 PROCEDURE — P9045 ALBUMIN (HUMAN), 5%, 250 ML: HCPCS | Performed by: NURSE PRACTITIONER

## 2020-07-23 PROCEDURE — 77030010389 HC WRE ATR PACE AEMC -B: Performed by: THORACIC SURGERY (CARDIOTHORACIC VASCULAR SURGERY)

## 2020-07-23 PROCEDURE — 03HB33Z INSERTION OF INFUSION DEVICE INTO RIGHT RADIAL ARTERY, PERCUTANEOUS APPROACH: ICD-10-PCS | Performed by: ANESTHESIOLOGY

## 2020-07-23 PROCEDURE — 74011250637 HC RX REV CODE- 250/637: Performed by: THORACIC SURGERY (CARDIOTHORACIC VASCULAR SURGERY)

## 2020-07-23 PROCEDURE — 88305 TISSUE EXAM BY PATHOLOGIST: CPT

## 2020-07-23 PROCEDURE — 65620000000 HC RM CCU GENERAL

## 2020-07-23 PROCEDURE — 74011000250 HC RX REV CODE- 250: Performed by: NURSE ANESTHETIST, CERTIFIED REGISTERED

## 2020-07-23 PROCEDURE — 77030002933 HC SUT MCRYL J&J -A: Performed by: THORACIC SURGERY (CARDIOTHORACIC VASCULAR SURGERY)

## 2020-07-23 PROCEDURE — 74011250636 HC RX REV CODE- 250/636: Performed by: THORACIC SURGERY (CARDIOTHORACIC VASCULAR SURGERY)

## 2020-07-23 PROCEDURE — C1751 CATH, INF, PER/CENT/MIDLINE: HCPCS

## 2020-07-23 PROCEDURE — 77030018836 HC SOL IRR NACL ICUM -A: Performed by: THORACIC SURGERY (CARDIOTHORACIC VASCULAR SURGERY)

## 2020-07-23 PROCEDURE — 36415 COLL VENOUS BLD VENIPUNCTURE: CPT

## 2020-07-23 PROCEDURE — 77030032400 HC CATH VENT LT HRT LIVA -B: Performed by: THORACIC SURGERY (CARDIOTHORACIC VASCULAR SURGERY)

## 2020-07-23 PROCEDURE — 77030018846 HC SOL IRR STRL H20 ICUM -A: Performed by: THORACIC SURGERY (CARDIOTHORACIC VASCULAR SURGERY)

## 2020-07-23 PROCEDURE — 77030016861: Performed by: THORACIC SURGERY (CARDIOTHORACIC VASCULAR SURGERY)

## 2020-07-23 PROCEDURE — 77030014491 HC PLEDG PTFE BARD -A: Performed by: THORACIC SURGERY (CARDIOTHORACIC VASCULAR SURGERY)

## 2020-07-23 PROCEDURE — 77030026438 HC STYL ET INTUB CARD -A: Performed by: NURSE ANESTHETIST, CERTIFIED REGISTERED

## 2020-07-23 PROCEDURE — 77030031139 HC SUT VCRL2 J&J -A: Performed by: THORACIC SURGERY (CARDIOTHORACIC VASCULAR SURGERY)

## 2020-07-23 PROCEDURE — 77030012770 HC TRCR OPT FX AMR -B: Performed by: THORACIC SURGERY (CARDIOTHORACIC VASCULAR SURGERY)

## 2020-07-23 PROCEDURE — 77030002922 HC SUT FBRWRE ARTH -B: Performed by: THORACIC SURGERY (CARDIOTHORACIC VASCULAR SURGERY)

## 2020-07-23 PROCEDURE — 76010000118 HC CV SURG 6 TO 6.5 HR: Performed by: THORACIC SURGERY (CARDIOTHORACIC VASCULAR SURGERY)

## 2020-07-23 PROCEDURE — 74011636637 HC RX REV CODE- 636/637: Performed by: THORACIC SURGERY (CARDIOTHORACIC VASCULAR SURGERY)

## 2020-07-23 PROCEDURE — 02HV33Z INSERTION OF INFUSION DEVICE INTO SUPERIOR VENA CAVA, PERCUTANEOUS APPROACH: ICD-10-PCS | Performed by: ANESTHESIOLOGY

## 2020-07-23 PROCEDURE — 74011250637 HC RX REV CODE- 250/637: Performed by: NURSE PRACTITIONER

## 2020-07-23 PROCEDURE — 74011000250 HC RX REV CODE- 250: Performed by: ANESTHESIOLOGY

## 2020-07-23 PROCEDURE — 77030005401 HC CATH RAD ARRO -A: Performed by: ANESTHESIOLOGY

## 2020-07-23 PROCEDURE — 82962 GLUCOSE BLOOD TEST: CPT

## 2020-07-23 PROCEDURE — 74011000250 HC RX REV CODE- 250: Performed by: THORACIC SURGERY (CARDIOTHORACIC VASCULAR SURGERY)

## 2020-07-23 PROCEDURE — 76060000043 HC ANESTHESIA 6 TO 6.5 HR: Performed by: THORACIC SURGERY (CARDIOTHORACIC VASCULAR SURGERY)

## 2020-07-23 PROCEDURE — 74011250636 HC RX REV CODE- 250/636

## 2020-07-23 PROCEDURE — 77030012390 HC DRN CHST BTL GTNG -B: Performed by: THORACIC SURGERY (CARDIOTHORACIC VASCULAR SURGERY)

## 2020-07-23 PROCEDURE — P9045 ALBUMIN (HUMAN), 5%, 250 ML: HCPCS

## 2020-07-23 PROCEDURE — 74011250636 HC RX REV CODE- 250/636: Performed by: ANESTHESIOLOGY

## 2020-07-23 PROCEDURE — 80053 COMPREHEN METABOLIC PANEL: CPT

## 2020-07-23 RX ORDER — INSULIN GLARGINE 100 [IU]/ML
1-50 INJECTION, SOLUTION SUBCUTANEOUS
Status: ACTIVE | OUTPATIENT
Start: 2020-07-23 | End: 2020-07-24

## 2020-07-23 RX ORDER — OXYCODONE HYDROCHLORIDE 5 MG/1
10 TABLET ORAL
Status: DISCONTINUED | OUTPATIENT
Start: 2020-07-23 | End: 2020-07-29 | Stop reason: HOSPADM

## 2020-07-23 RX ORDER — ALBUMIN HUMAN 50 G/1000ML
SOLUTION INTRAVENOUS
Status: COMPLETED
Start: 2020-07-23 | End: 2020-07-23

## 2020-07-23 RX ORDER — SODIUM CHLORIDE 0.9 % (FLUSH) 0.9 %
5-40 SYRINGE (ML) INJECTION EVERY 8 HOURS
Status: DISCONTINUED | OUTPATIENT
Start: 2020-07-23 | End: 2020-07-27

## 2020-07-23 RX ORDER — HEPARIN SODIUM 1000 [USP'U]/ML
2000 INJECTION, SOLUTION INTRAVENOUS; SUBCUTANEOUS ONCE
Status: DISCONTINUED | OUTPATIENT
Start: 2020-07-23 | End: 2020-07-23

## 2020-07-23 RX ORDER — FENTANYL CITRATE 50 UG/ML
INJECTION, SOLUTION INTRAMUSCULAR; INTRAVENOUS AS NEEDED
Status: DISCONTINUED | OUTPATIENT
Start: 2020-07-23 | End: 2020-07-23 | Stop reason: HOSPADM

## 2020-07-23 RX ORDER — SODIUM CHLORIDE 9 MG/ML
50 INJECTION, SOLUTION INTRAVENOUS CONTINUOUS
Status: DISCONTINUED | OUTPATIENT
Start: 2020-07-23 | End: 2020-07-23

## 2020-07-23 RX ORDER — MIDAZOLAM HYDROCHLORIDE 1 MG/ML
1 INJECTION, SOLUTION INTRAMUSCULAR; INTRAVENOUS AS NEEDED
Status: DISCONTINUED | OUTPATIENT
Start: 2020-07-23 | End: 2020-07-23

## 2020-07-23 RX ORDER — NALOXONE HYDROCHLORIDE 0.4 MG/ML
0.4 INJECTION, SOLUTION INTRAMUSCULAR; INTRAVENOUS; SUBCUTANEOUS AS NEEDED
Status: DISCONTINUED | OUTPATIENT
Start: 2020-07-23 | End: 2020-07-29 | Stop reason: HOSPADM

## 2020-07-23 RX ORDER — INSULIN LISPRO 100 [IU]/ML
INJECTION, SOLUTION INTRAVENOUS; SUBCUTANEOUS
Status: DISCONTINUED | OUTPATIENT
Start: 2020-07-23 | End: 2020-07-25

## 2020-07-23 RX ORDER — FENTANYL CITRATE 50 UG/ML
50 INJECTION, SOLUTION INTRAMUSCULAR; INTRAVENOUS AS NEEDED
Status: DISCONTINUED | OUTPATIENT
Start: 2020-07-23 | End: 2020-07-23

## 2020-07-23 RX ORDER — SODIUM CHLORIDE, SODIUM LACTATE, POTASSIUM CHLORIDE, CALCIUM CHLORIDE 600; 310; 30; 20 MG/100ML; MG/100ML; MG/100ML; MG/100ML
75 INJECTION, SOLUTION INTRAVENOUS CONTINUOUS
Status: DISCONTINUED | OUTPATIENT
Start: 2020-07-23 | End: 2020-07-23

## 2020-07-23 RX ORDER — SODIUM CHLORIDE 450 MG/100ML
10 INJECTION, SOLUTION INTRAVENOUS CONTINUOUS
Status: DISCONTINUED | OUTPATIENT
Start: 2020-07-23 | End: 2020-07-25

## 2020-07-23 RX ORDER — PROTAMINE SULFATE 10 MG/ML
INJECTION, SOLUTION INTRAVENOUS AS NEEDED
Status: DISCONTINUED | OUTPATIENT
Start: 2020-07-23 | End: 2020-07-23 | Stop reason: HOSPADM

## 2020-07-23 RX ORDER — MUPIROCIN 20 MG/G
OINTMENT TOPICAL 2 TIMES DAILY
Status: DISPENSED | OUTPATIENT
Start: 2020-07-23 | End: 2020-07-28

## 2020-07-23 RX ORDER — DEXTROSE 50 % IN WATER (D50W) INTRAVENOUS SYRINGE
12.5-25 AS NEEDED
Status: DISCONTINUED | OUTPATIENT
Start: 2020-07-23 | End: 2020-07-27

## 2020-07-23 RX ORDER — SODIUM BICARBONATE 1 MEQ/ML
SYRINGE (ML) INTRAVENOUS
Status: DISPENSED
Start: 2020-07-23 | End: 2020-07-24

## 2020-07-23 RX ORDER — HEPARIN SODIUM 1000 [USP'U]/ML
INJECTION, SOLUTION INTRAVENOUS; SUBCUTANEOUS AS NEEDED
Status: DISCONTINUED | OUTPATIENT
Start: 2020-07-23 | End: 2020-07-23 | Stop reason: HOSPADM

## 2020-07-23 RX ORDER — SODIUM CHLORIDE 0.9 % (FLUSH) 0.9 %
5-40 SYRINGE (ML) INJECTION AS NEEDED
Status: DISCONTINUED | OUTPATIENT
Start: 2020-07-23 | End: 2020-07-23

## 2020-07-23 RX ORDER — ROCURONIUM BROMIDE 10 MG/ML
INJECTION, SOLUTION INTRAVENOUS AS NEEDED
Status: DISCONTINUED | OUTPATIENT
Start: 2020-07-23 | End: 2020-07-23 | Stop reason: HOSPADM

## 2020-07-23 RX ORDER — SODIUM CHLORIDE 0.9 % (FLUSH) 0.9 %
5-40 SYRINGE (ML) INJECTION EVERY 8 HOURS
Status: DISCONTINUED | OUTPATIENT
Start: 2020-07-23 | End: 2020-07-23

## 2020-07-23 RX ORDER — SUFENTANIL CITRATE 50 UG/ML
INJECTION EPIDURAL; INTRAVENOUS AS NEEDED
Status: DISCONTINUED | OUTPATIENT
Start: 2020-07-23 | End: 2020-07-23 | Stop reason: HOSPADM

## 2020-07-23 RX ORDER — MIDAZOLAM HYDROCHLORIDE 1 MG/ML
1 INJECTION, SOLUTION INTRAMUSCULAR; INTRAVENOUS
Status: DISCONTINUED | OUTPATIENT
Start: 2020-07-23 | End: 2020-07-24

## 2020-07-23 RX ORDER — FAMOTIDINE 20 MG/1
20 TABLET, FILM COATED ORAL EVERY 12 HOURS
Status: DISCONTINUED | OUTPATIENT
Start: 2020-07-24 | End: 2020-07-29 | Stop reason: HOSPADM

## 2020-07-23 RX ORDER — MAGNESIUM SULFATE 100 %
4 CRYSTALS MISCELLANEOUS AS NEEDED
Status: DISCONTINUED | OUTPATIENT
Start: 2020-07-23 | End: 2020-07-27

## 2020-07-23 RX ORDER — CHLORHEXIDINE GLUCONATE 1.2 MG/ML
10 RINSE ORAL EVERY 12 HOURS
Status: DISCONTINUED | OUTPATIENT
Start: 2020-07-23 | End: 2020-07-29 | Stop reason: HOSPADM

## 2020-07-23 RX ORDER — SUFENTANIL CITRATE 50 UG/ML
INJECTION EPIDURAL; INTRAVENOUS
Status: DISCONTINUED | OUTPATIENT
Start: 2020-07-23 | End: 2020-07-23 | Stop reason: HOSPADM

## 2020-07-23 RX ORDER — MUPIROCIN 20 MG/G
1 OINTMENT TOPICAL 2 TIMES DAILY
Status: DISCONTINUED | OUTPATIENT
Start: 2020-07-23 | End: 2020-07-23

## 2020-07-23 RX ORDER — INSULIN LISPRO 100 [IU]/ML
INJECTION, SOLUTION INTRAVENOUS; SUBCUTANEOUS
Status: DISCONTINUED | OUTPATIENT
Start: 2020-07-23 | End: 2020-07-29 | Stop reason: HOSPADM

## 2020-07-23 RX ORDER — SODIUM CHLORIDE 9 MG/ML
INJECTION, SOLUTION INTRAVENOUS
Status: DISCONTINUED | OUTPATIENT
Start: 2020-07-23 | End: 2020-07-23 | Stop reason: HOSPADM

## 2020-07-23 RX ORDER — FACIAL-BODY WIPES
10 EACH TOPICAL DAILY PRN
Status: DISCONTINUED | OUTPATIENT
Start: 2020-07-23 | End: 2020-07-29 | Stop reason: HOSPADM

## 2020-07-23 RX ORDER — MORPHINE SULFATE 10 MG/ML
INJECTION, SOLUTION INTRAMUSCULAR; INTRAVENOUS AS NEEDED
Status: DISCONTINUED | OUTPATIENT
Start: 2020-07-23 | End: 2020-07-23 | Stop reason: HOSPADM

## 2020-07-23 RX ORDER — POLYETHYLENE GLYCOL 3350 17 G/17G
17 POWDER, FOR SOLUTION ORAL DAILY
Status: DISCONTINUED | OUTPATIENT
Start: 2020-07-24 | End: 2020-07-29 | Stop reason: HOSPADM

## 2020-07-23 RX ORDER — SODIUM CHLORIDE 0.9 % (FLUSH) 0.9 %
5-40 SYRINGE (ML) INJECTION AS NEEDED
Status: DISCONTINUED | OUTPATIENT
Start: 2020-07-23 | End: 2020-07-27

## 2020-07-23 RX ORDER — PROPOFOL 10 MG/ML
INJECTION, EMULSION INTRAVENOUS AS NEEDED
Status: DISCONTINUED | OUTPATIENT
Start: 2020-07-23 | End: 2020-07-23 | Stop reason: HOSPADM

## 2020-07-23 RX ORDER — OXYCODONE HYDROCHLORIDE 5 MG/1
5 TABLET ORAL
Status: DISCONTINUED | OUTPATIENT
Start: 2020-07-23 | End: 2020-07-29 | Stop reason: HOSPADM

## 2020-07-23 RX ORDER — AMIODARONE HYDROCHLORIDE 200 MG/1
400 TABLET ORAL EVERY 12 HOURS
Status: DISCONTINUED | OUTPATIENT
Start: 2020-07-24 | End: 2020-07-24

## 2020-07-23 RX ORDER — ALBUMIN HUMAN 50 G/1000ML
SOLUTION INTRAVENOUS AS NEEDED
Status: DISCONTINUED | OUTPATIENT
Start: 2020-07-23 | End: 2020-07-23 | Stop reason: HOSPADM

## 2020-07-23 RX ORDER — ALBUTEROL SULFATE 0.83 MG/ML
2.5 SOLUTION RESPIRATORY (INHALATION)
Status: DISCONTINUED | OUTPATIENT
Start: 2020-07-23 | End: 2020-07-29 | Stop reason: HOSPADM

## 2020-07-23 RX ORDER — GUAIFENESIN 100 MG/5ML
81 LIQUID (ML) ORAL DAILY
Status: DISCONTINUED | OUTPATIENT
Start: 2020-07-24 | End: 2020-07-29 | Stop reason: HOSPADM

## 2020-07-23 RX ORDER — ACETAMINOPHEN 325 MG/1
650 TABLET ORAL EVERY 4 HOURS
Status: DISPENSED | OUTPATIENT
Start: 2020-07-23 | End: 2020-07-24

## 2020-07-23 RX ORDER — SODIUM BICARBONATE 1 MEQ/ML
SYRINGE (ML) INTRAVENOUS AS NEEDED
Status: DISCONTINUED | OUTPATIENT
Start: 2020-07-23 | End: 2020-07-23 | Stop reason: HOSPADM

## 2020-07-23 RX ORDER — LANOLIN ALCOHOL/MO/W.PET/CERES
400 CREAM (GRAM) TOPICAL 2 TIMES DAILY
Status: DISCONTINUED | OUTPATIENT
Start: 2020-07-24 | End: 2020-07-29 | Stop reason: HOSPADM

## 2020-07-23 RX ORDER — SODIUM CHLORIDE 9 MG/ML
9 INJECTION, SOLUTION INTRAVENOUS CONTINUOUS
Status: DISCONTINUED | OUTPATIENT
Start: 2020-07-23 | End: 2020-07-25

## 2020-07-23 RX ORDER — LIDOCAINE HYDROCHLORIDE 10 MG/ML
0.1 INJECTION, SOLUTION EPIDURAL; INFILTRATION; INTRACAUDAL; PERINEURAL AS NEEDED
Status: DISCONTINUED | OUTPATIENT
Start: 2020-07-23 | End: 2020-07-23

## 2020-07-23 RX ORDER — KETOROLAC TROMETHAMINE 30 MG/ML
15 INJECTION, SOLUTION INTRAMUSCULAR; INTRAVENOUS EVERY 6 HOURS
Status: DISCONTINUED | OUTPATIENT
Start: 2020-07-23 | End: 2020-07-24

## 2020-07-23 RX ORDER — SODIUM CHLORIDE, SODIUM LACTATE, POTASSIUM CHLORIDE, CALCIUM CHLORIDE 600; 310; 30; 20 MG/100ML; MG/100ML; MG/100ML; MG/100ML
25 INJECTION, SOLUTION INTRAVENOUS CONTINUOUS
Status: DISCONTINUED | OUTPATIENT
Start: 2020-07-23 | End: 2020-07-23

## 2020-07-23 RX ORDER — LIDOCAINE HYDROCHLORIDE 20 MG/ML
INJECTION, SOLUTION EPIDURAL; INFILTRATION; INTRACAUDAL; PERINEURAL AS NEEDED
Status: DISCONTINUED | OUTPATIENT
Start: 2020-07-23 | End: 2020-07-23 | Stop reason: HOSPADM

## 2020-07-23 RX ORDER — MORPHINE SULFATE 2 MG/ML
4 INJECTION, SOLUTION INTRAMUSCULAR; INTRAVENOUS
Status: DISCONTINUED | OUTPATIENT
Start: 2020-07-23 | End: 2020-07-27

## 2020-07-23 RX ORDER — MAGNESIUM SULFATE 1 G/100ML
1 INJECTION INTRAVENOUS AS NEEDED
Status: DISCONTINUED | OUTPATIENT
Start: 2020-07-23 | End: 2020-07-24

## 2020-07-23 RX ORDER — SODIUM BICARBONATE 1 MEQ/ML
100 SYRINGE (ML) INTRAVENOUS ONCE
Status: COMPLETED | OUTPATIENT
Start: 2020-07-23 | End: 2020-07-23

## 2020-07-23 RX ORDER — ALBUMIN HUMAN 50 G/1000ML
12.5 SOLUTION INTRAVENOUS
Status: COMPLETED | OUTPATIENT
Start: 2020-07-23 | End: 2020-07-24

## 2020-07-23 RX ORDER — DEXAMETHASONE SODIUM PHOSPHATE 4 MG/ML
INJECTION, SOLUTION INTRA-ARTICULAR; INTRALESIONAL; INTRAMUSCULAR; INTRAVENOUS; SOFT TISSUE AS NEEDED
Status: DISCONTINUED | OUTPATIENT
Start: 2020-07-23 | End: 2020-07-23 | Stop reason: HOSPADM

## 2020-07-23 RX ORDER — AMIODARONE HYDROCHLORIDE 200 MG/1
400 TABLET ORAL EVERY 12 HOURS
Status: DISCONTINUED | OUTPATIENT
Start: 2020-07-23 | End: 2020-07-23

## 2020-07-23 RX ORDER — ONDANSETRON 2 MG/ML
INJECTION INTRAMUSCULAR; INTRAVENOUS AS NEEDED
Status: DISCONTINUED | OUTPATIENT
Start: 2020-07-23 | End: 2020-07-23 | Stop reason: HOSPADM

## 2020-07-23 RX ORDER — MIDAZOLAM HYDROCHLORIDE 1 MG/ML
INJECTION, SOLUTION INTRAMUSCULAR; INTRAVENOUS AS NEEDED
Status: DISCONTINUED | OUTPATIENT
Start: 2020-07-23 | End: 2020-07-23 | Stop reason: HOSPADM

## 2020-07-23 RX ORDER — NEOSTIGMINE METHYLSULFATE 1 MG/ML
INJECTION INTRAVENOUS AS NEEDED
Status: DISCONTINUED | OUTPATIENT
Start: 2020-07-23 | End: 2020-07-23 | Stop reason: HOSPADM

## 2020-07-23 RX ORDER — GLYCOPYRROLATE 0.2 MG/ML
INJECTION INTRAMUSCULAR; INTRAVENOUS AS NEEDED
Status: DISCONTINUED | OUTPATIENT
Start: 2020-07-23 | End: 2020-07-23 | Stop reason: HOSPADM

## 2020-07-23 RX ORDER — CEFAZOLIN SODIUM 1 G/3ML
1 INJECTION, POWDER, FOR SOLUTION INTRAMUSCULAR; INTRAVENOUS ONCE
Status: COMPLETED | OUTPATIENT
Start: 2020-07-23 | End: 2020-07-23

## 2020-07-23 RX ORDER — BUPIVACAINE HYDROCHLORIDE 5 MG/ML
INJECTION, SOLUTION EPIDURAL; INTRACAUDAL
Status: COMPLETED | OUTPATIENT
Start: 2020-07-23 | End: 2020-07-23

## 2020-07-23 RX ORDER — AMOXICILLIN 250 MG
1 CAPSULE ORAL 2 TIMES DAILY
Status: DISCONTINUED | OUTPATIENT
Start: 2020-07-24 | End: 2020-07-29 | Stop reason: HOSPADM

## 2020-07-23 RX ORDER — ONDANSETRON 2 MG/ML
4 INJECTION INTRAMUSCULAR; INTRAVENOUS
Status: DISCONTINUED | OUTPATIENT
Start: 2020-07-23 | End: 2020-07-29 | Stop reason: HOSPADM

## 2020-07-23 RX ORDER — SUCCINYLCHOLINE CHLORIDE 20 MG/ML
INJECTION INTRAMUSCULAR; INTRAVENOUS AS NEEDED
Status: DISCONTINUED | OUTPATIENT
Start: 2020-07-23 | End: 2020-07-23 | Stop reason: HOSPADM

## 2020-07-23 RX ADMIN — ONDANSETRON HYDROCHLORIDE 4 MG: 2 INJECTION, SOLUTION INTRAMUSCULAR; INTRAVENOUS at 13:25

## 2020-07-23 RX ADMIN — FENTANYL CITRATE 50 MCG: 50 INJECTION, SOLUTION INTRAMUSCULAR; INTRAVENOUS at 06:45

## 2020-07-23 RX ADMIN — FAMOTIDINE 20 MG: 10 INJECTION, SOLUTION INTRAVENOUS at 20:27

## 2020-07-23 RX ADMIN — ALBUMIN (HUMAN) 12.5 G: 12.5 INJECTION, SOLUTION INTRAVENOUS at 14:52

## 2020-07-23 RX ADMIN — SODIUM CHLORIDE 80 MCG: 900 INJECTION, SOLUTION INTRAVENOUS at 08:16

## 2020-07-23 RX ADMIN — WATER 2 G: 1 INJECTION INTRAMUSCULAR; INTRAVENOUS; SUBCUTANEOUS at 11:02

## 2020-07-23 RX ADMIN — MIDAZOLAM HYDROCHLORIDE 1 MG: 1 INJECTION, SOLUTION INTRAMUSCULAR; INTRAVENOUS at 06:36

## 2020-07-23 RX ADMIN — DEXMEDETOMIDINE HYDROCHLORIDE 0.4 MCG/KG/HR: 100 INJECTION, SOLUTION, CONCENTRATE INTRAVENOUS at 11:28

## 2020-07-23 RX ADMIN — SODIUM CHLORIDE 9 ML/HR: 900 INJECTION, SOLUTION INTRAVENOUS at 14:57

## 2020-07-23 RX ADMIN — MIDAZOLAM HYDROCHLORIDE 1 MG: 1 INJECTION, SOLUTION INTRAMUSCULAR; INTRAVENOUS at 06:37

## 2020-07-23 RX ADMIN — ACETAMINOPHEN 650 MG: 325 TABLET ORAL at 17:14

## 2020-07-23 RX ADMIN — Medication 10 ML: at 15:06

## 2020-07-23 RX ADMIN — SODIUM CHLORIDE, SODIUM LACTATE, POTASSIUM CHLORIDE, AND CALCIUM CHLORIDE 25 ML/HR: 600; 310; 30; 20 INJECTION, SOLUTION INTRAVENOUS at 06:15

## 2020-07-23 RX ADMIN — FENTANYL CITRATE 50 MCG: 50 INJECTION, SOLUTION INTRAMUSCULAR; INTRAVENOUS at 06:35

## 2020-07-23 RX ADMIN — HEPARIN SODIUM 40000 UNITS: 1000 INJECTION, SOLUTION INTRAVENOUS; SUBCUTANEOUS at 09:14

## 2020-07-23 RX ADMIN — SODIUM BICARBONATE 100 MEQ: 84 INJECTION INTRAVENOUS at 15:41

## 2020-07-23 RX ADMIN — SODIUM CHLORIDE: 9 INJECTION, SOLUTION INTRAVENOUS at 08:00

## 2020-07-23 RX ADMIN — SODIUM CHLORIDE 10 ML/HR: 450 INJECTION, SOLUTION INTRAVENOUS at 14:51

## 2020-07-23 RX ADMIN — PROTAMINE SULFATE 250 MG: 10 INJECTION, SOLUTION INTRAVENOUS at 12:34

## 2020-07-23 RX ADMIN — WATER 2 G: 1 INJECTION INTRAMUSCULAR; INTRAVENOUS; SUBCUTANEOUS at 16:04

## 2020-07-23 RX ADMIN — SODIUM CHLORIDE 2.2 UNITS/HR: 9 INJECTION, SOLUTION INTRAVENOUS at 10:55

## 2020-07-23 RX ADMIN — LIDOCAINE HYDROCHLORIDE 100 MG: 20 INJECTION, SOLUTION INTRAVENOUS at 07:55

## 2020-07-23 RX ADMIN — DEXAMETHASONE SODIUM PHOSPHATE 8 MG: 4 INJECTION, SOLUTION INTRAMUSCULAR; INTRAVENOUS at 08:00

## 2020-07-23 RX ADMIN — MUPIROCIN: 20 OINTMENT TOPICAL at 21:00

## 2020-07-23 RX ADMIN — WATER 2 G: 1 INJECTION INTRAMUSCULAR; INTRAVENOUS; SUBCUTANEOUS at 08:08

## 2020-07-23 RX ADMIN — SUFENTANIL CITRATE 20 MCG: 50 INJECTION EPIDURAL; INTRAVENOUS at 07:55

## 2020-07-23 RX ADMIN — SODIUM BICARBONATE 50 MEQ: 84 INJECTION, SOLUTION INTRAVENOUS at 12:54

## 2020-07-23 RX ADMIN — SODIUM CHLORIDE 120 MCG/MIN: 900 INJECTION, SOLUTION INTRAVENOUS at 07:59

## 2020-07-23 RX ADMIN — Medication 3 AMPULE: at 07:27

## 2020-07-23 RX ADMIN — AMINOCAPROIC ACID 10 G/HR: 250 INJECTION, SOLUTION INTRAVENOUS at 08:05

## 2020-07-23 RX ADMIN — ALBUMIN (HUMAN) 12.5 G: 12.5 INJECTION, SOLUTION INTRAVENOUS at 15:03

## 2020-07-23 RX ADMIN — SUFENTANIL CITRATE 10 MCG: 50 INJECTION EPIDURAL; INTRAVENOUS at 08:55

## 2020-07-23 RX ADMIN — ACETAMINOPHEN 650 MG: 325 TABLET ORAL at 20:26

## 2020-07-23 RX ADMIN — KETOROLAC TROMETHAMINE 15 MG: 30 INJECTION, SOLUTION INTRAMUSCULAR at 20:26

## 2020-07-23 RX ADMIN — PROPOFOL 140 MG: 10 INJECTION, EMULSION INTRAVENOUS at 07:55

## 2020-07-23 RX ADMIN — ROCURONIUM BROMIDE 30 MG: 10 INJECTION INTRAVENOUS at 09:40

## 2020-07-23 RX ADMIN — ROCURONIUM BROMIDE 65 MG: 10 INJECTION INTRAVENOUS at 08:10

## 2020-07-23 RX ADMIN — SUFENTANIL CITRATE 20 MCG: 50 INJECTION EPIDURAL; INTRAVENOUS at 08:37

## 2020-07-23 RX ADMIN — NEOSTIGMINE METHYLSULFATE 3 MG: 1 INJECTION INTRAVENOUS at 14:00

## 2020-07-23 RX ADMIN — ALBUMIN (HUMAN) 250 ML: 2.5 SOLUTION INTRAVENOUS at 12:31

## 2020-07-23 RX ADMIN — SODIUM CHLORIDE 40 MCG: 900 INJECTION, SOLUTION INTRAVENOUS at 08:09

## 2020-07-23 RX ADMIN — BUPIVACAINE HYDROCHLORIDE 30 ML: 5 INJECTION, SOLUTION EPIDURAL; INTRACAUDAL; PERINEURAL at 14:16

## 2020-07-23 RX ADMIN — ROCURONIUM BROMIDE 5 MG: 10 INJECTION INTRAVENOUS at 07:55

## 2020-07-23 RX ADMIN — Medication 10 ML: at 21:34

## 2020-07-23 RX ADMIN — MAGNESIUM SULFATE IN WATER 2 G: 40 INJECTION, SOLUTION INTRAVENOUS at 12:34

## 2020-07-23 RX ADMIN — ROCURONIUM BROMIDE 30 MG: 10 INJECTION INTRAVENOUS at 12:00

## 2020-07-23 RX ADMIN — SUCCINYLCHOLINE CHLORIDE 160 MG: 20 INJECTION, SOLUTION INTRAMUSCULAR; INTRAVENOUS at 07:55

## 2020-07-23 RX ADMIN — Medication 10 ML: at 17:59

## 2020-07-23 RX ADMIN — SODIUM CHLORIDE 40 MCG: 900 INJECTION, SOLUTION INTRAVENOUS at 08:13

## 2020-07-23 RX ADMIN — GLYCOPYRROLATE 0.6 MG: 0.2 INJECTION, SOLUTION INTRAMUSCULAR; INTRAVENOUS at 14:00

## 2020-07-23 RX ADMIN — ACETAMINOPHEN 650 MG: 325 TABLET ORAL at 23:58

## 2020-07-23 RX ADMIN — MORPHINE SULFATE 2 MG: 10 INJECTION INTRAVENOUS at 13:19

## 2020-07-23 RX ADMIN — ALBUMIN (HUMAN) 12.5 G: 12.5 INJECTION, SOLUTION INTRAVENOUS at 15:27

## 2020-07-23 RX ADMIN — SODIUM CHLORIDE: 9 INJECTION, SOLUTION INTRAVENOUS at 07:49

## 2020-07-23 RX ADMIN — CHLORHEXIDINE GLUCONATE 10 ML: 1.2 RINSE ORAL at 20:30

## 2020-07-23 RX ADMIN — SUFENTANIL CITRATE 0.3 MCG/KG/HR: 50 INJECTION EPIDURAL; INTRAVENOUS at 08:05

## 2020-07-23 RX ADMIN — MIDAZOLAM HYDROCHLORIDE 1 MG: 1 INJECTION, SOLUTION INTRAMUSCULAR; INTRAVENOUS at 06:48

## 2020-07-23 RX ADMIN — CHLORHEXIDINE GLUCONATE 10 ML: 1.2 RINSE ORAL at 17:51

## 2020-07-23 RX ADMIN — MIDAZOLAM HYDROCHLORIDE 1 MG: 1 INJECTION, SOLUTION INTRAMUSCULAR; INTRAVENOUS at 06:45

## 2020-07-23 RX ADMIN — DESMOPRESSIN ACETATE 30 MCG: 4 INJECTION INTRAVENOUS at 12:43

## 2020-07-23 RX ADMIN — WATER 2 G: 1 INJECTION INTRAMUSCULAR; INTRAVENOUS; SUBCUTANEOUS at 20:26

## 2020-07-23 RX ADMIN — MIDAZOLAM HYDROCHLORIDE 2 MG: 1 INJECTION, SOLUTION INTRAMUSCULAR; INTRAVENOUS at 06:34

## 2020-07-23 RX ADMIN — MIDAZOLAM HYDROCHLORIDE 1 MG: 1 INJECTION, SOLUTION INTRAMUSCULAR; INTRAVENOUS at 06:51

## 2020-07-23 RX ADMIN — FAMOTIDINE 20 MG: 10 INJECTION, SOLUTION INTRAVENOUS at 16:06

## 2020-07-23 NOTE — PROGRESS NOTES
Cardiac Surgery Care Coordinator- Met with the wife of Nate Rose. Introduced role of the Cardiac Surgery Coordinator. Reviewed plan of care and day of surgery expectations. Provided wife with an update from OR. Encouraged her to verbalize and emotional support given. Will continue to update throughout the day.  Tita Park RN

## 2020-07-23 NOTE — PROGRESS NOTES
Acute Care - Occupational Therapy Re-Evaluation  [facility]     Patient Name: Emerson Bang  : 1958  MRN: [id number]  Today's Date: 8/3/2019  Onset of Illness/Injury or Date of Surgery: 07/15/19  Date of Referral to OT: 19  Referring Physician: Shanda ZUÑIGA    Admit Date: 7/15/2019       ICD-10-CM ICD-9-CM   1. SBO (small bowel obstruction) (CMS/[facility]) K56.609 560.9   2. Impaired functional mobility and activity tolerance Z74.09 V49.89   3. Impaired mobility and ADLs Z74.09 799.89   4. SIADH (syndrome of inappropriate ADH production) (CMS/[facility]) E22.2 253.6   5. Acquired hypothyroidism E03.9 244.9   6. Idiopathic hypotension I95.0 458.9   7. Hyponatremia E87.1 276.1     Patient Active Problem List   Diagnosis   • Hyperkalemia   • Iron deficiency anemia   • Gastroesophageal reflux disease with esophagitis   • Elevated AST (SGOT)   • Alcohol abuse   • Hypothyroidism   • Balance problem   • Need for vaccination   • Low magnesium levels   • Squamous cell carcinoma of pharynx (CMS/HCC)   • History of throat cancer   • Vitamin D deficiency   • Alcohol abuse counseling and surveillance   • Encounter for screening for diabetes mellitus   • Hypomagnesemia   • Iron deficiency anemia   • Hyperlipidemia   • Underweight due to inadequate caloric intake   • Need for immunization against influenza   • Hemoglobin C trait (CMS/HCC)   • Hypertension   • General medical examination   • Underweight   • Epigastric pain   • Gastritis without bleeding   • Need for influenza vaccination   • Elevated liver function tests   • B12 deficiency   • Intestinal malabsorption   • Throat pain   • Acute pharyngitis   • Upper respiratory tract infection   • Neoplasm of uncertain behavior of larynx   • Pharyngoesophageal dysphagia   • Severe protein-calorie malnutrition (CMS/HCC)   • Anemia of chronic disease   • Hypotension   • Hyponatremia     Past Medical History:   Diagnosis Date   • Alcohol abuse    • Allergic rhinitis      Order received and acknowledged. Pt with MVR surgery 7/23/2020.  Per guidelines will follow up tomorrow for PT eval. vs URI   • Anemia    • At risk for falls    • Benign prostatic hyperplasia    • Chronic gastritis    • Cirrhosis of liver (CMS/HCC)    • Complication of gastrostomy (CMS/HCC)     site not healing   • COPD (chronic obstructive pulmonary disease) (CMS/HCC)    • Dysphagia     odynophagia   • Epigastric pain    • Esophagitis    • GERD (gastroesophageal reflux disease)    • Hypertension    • Hypothyroidism, unspecified    • Low back pain    • Malaise and fatigue    • Nasal congestion 11/15/2018   • Nausea with vomiting, unspecified    • Pain in left knee    • Pain in right knee    • Primary malignant neoplasm of pharynx (CMS/HCC)    • Screening for malignant neoplasm of colon    • Vitamin D deficiency      Past Surgical History:   Procedure Laterality Date   • ABDOMINAL WALL SURGERY  11/04/2008    Laparotomy with repair of stomach wall perforation. Gastrostomy tube in [place]itzel tunnel. Left upper quadrant abdominal wall abscess debridement. Gastrostomy tube erosion through & through the anterior gastric wall.Lupper quadrant abdominal wall abscess   • COLONOSCOPY  04/21/2014    Internal & external hemorrhoids found.   • COLONOSCOPY  2014    [place]   • COLONOSCOPY N/A 10/16/2018    Procedure: COLONOSCOPY;  Surgeon: Kaushal Chester MD;  Location: [facility] ENDOSCOPY;  Service: Gastroenterology   • DIAGNOSTIC LAPAROSCOPY EXPLORATORY LAPAROTOMY N/A 7/17/2019    Procedure: DIAGNOSTIC LAPAROSCOPY, EXPLORATORY LAPAROTOMY, LYSIS OF ADHESIONS;  Surgeon: Parminder Kc MD;  Location: [facility] OR;  Service: General   • DIRECT LARYNGOSCOPY, ESOPHAGOSCOPY, BRONCHOSCOPY N/A 4/15/2019    Procedure: DIRECT LARYNGOSCOPY with biopsy, ESOPHAGOSCOPY;  Surgeon: Bharathi Washington MD;  Location: [facility] OR;  Service: ENT   • ENDOSCOPY N/A 10/16/2018    Procedure: ESOPHAGOGASTRODUODENOSCOPY;  Surgeon: Kaushal Chester MD;  Location: [facility] ENDOSCOPY;  Service: Gastroenterology   • GASTROSTOMY FEEDING TUBE INSERTION N/A 4/15/2019     Procedure: GASTROSTOMY FEEDING TUBE INSERTION;  Surgeon: Trenton Valera MD;  Location: [facility] OR;  Service: General   • TRACHEOSTOMY  11/10/2008    Respiratory failure   • UPPER GASTROINTESTINAL ENDOSCOPY  04/21/2014    Esophagitis seen. Biopsy taken. Gastritis in stomach. Biopsy taken. Normal duodenum. Biopsy taken.   • UPPER GASTROINTESTINAL ENDOSCOPY  10/16/2018          OT ASSESSMENT FLOWSHEET (last 12 hours)      Occupational Therapy Evaluation     Row Name 08/03/19 1324                   OT Evaluation Time/Intention    Subjective Information  no complaints  -        Document Type  re-evaluation  -        Mode of Treatment  co-treatment;occupational therapy;physical therapy  -        Total Evaluation Minutes, Occupational Therapy  50  -        Patient Effort  good  -        Comment  Communicated with pt and pt's dtr throughout. Educated on home safety and answered all questions and concerns.   -           General Information    Onset of Illness/Injury or Date of Surgery  07/15/19  -        Referring Physician  Shanda ZUÑIGA  -        Patient Observations  alert;cooperative;agree to therapy  -        Patient/Family Observations  dtr and grandson present   -        General Observations of Patient  pt supine HOB Up on trach, bed alarm, tele, IV, tube feeding, suction   -        Prior Level of Function  independent:;all household mobility;transfer;bed mobility;ADL's  -        Equipment Currently Used at Home  none  -        Pertinent History of Current Functional Problem  pt re- eval after being transferred to ICU with low BP  -        Existing Precautions/Restrictions  fall;oxygen therapy device and L/min multiple lines and tubes  -        Limitations/Impairments  safety/cognitive  -        Risks Reviewed  patient and family:  -        Benefits Reviewed  patient and family:  -        Barriers to Rehab  medically complex  -           Relationship/Environment     Lives With  child(lola), adult dtr  -           Resource/Environmental Concerns    Current Living Arrangements  home/apartment/condo  -           Home Main Entrance    Number of Stairs, Main Entrance  one  -        Stair Railings, Main Entrance  none  -           Cognitive Assessment/Intervention- PT/OT    Affect/Mental Status (Cognitive)  WFL  -        Orientation Status (Cognition)  oriented to;person;place  -        Follows Commands (Cognition)  over 90% accuracy;verbal cues/prompting required;repetition of directions required  -        Safety Deficit (Cognitive)  mild deficit  -        Personal Safety Interventions  fall prevention program maintained;gait belt;nonskid shoes/slippers when out of bed;supervised activity watch gait belt placement   -           Safety Issues, Functional Mobility    Safety Issues Affecting Function (Mobility)  awareness of need for assistance;insight into deficits/self awareness;judgment;positioning of assistive device;problem solving;safety precaution awareness;safety precautions follow-through/compliance;sequencing abilities  -        Impairments Affecting Function (Mobility)  balance;endurance/activity tolerance;coordination;pain;strength;shortness of breath;postural/trunk control;motor planning;motor control  -           Bed Mobility Assessment/Treatment    Supine-Sit [place] (Bed Mobility)  contact guard;nonverbal cues (demo/gesture);verbal cues  -        Assistive Device (Bed Mobility)  bed rails;head of bed elevated  -           Functional Mobility    Functional Mobility- Ind. Level  contact guard assist;2 person assist required;nonverbal cues required (demo/gesture);verbal cues required  -        Functional Mobility- Device  rolling walker  -        Functional Mobility- Comment  pt slightly unsteady, used walker after encouragement required increased assist with all the lines and tubes   -           Transfer Assessment/Treatment    Transfer  Assessment/Treatment  sit-stand transfer;stand-sit transfer;bed-chair transfer  -           Bed-Chair Transfer    Bed-Chair [place] (Transfers)  contact guard;nonverbal cues (demo/gesture);verbal cues;1 person assist;2 person assist  -        Assistive Device (Bed-Chair Transfers)  walker, front-wheeled  -           Sit-Stand Transfer    Sit-Stand [place] (Transfers)  contact guard;nonverbal cues (demo/gesture);verbal cues  -           Stand-Sit Transfer    Stand-Sit [place] (Transfers)  contact guard;nonverbal cues (demo/gesture);verbal cues  -           Bathing Assessment/Intervention    Comment (Bathing)  completed education with pt and pt's dtr about home safety, benefits of a walker, benefits of a BSC at home, benefits of getting up and moving and doing activity resting when needed, benefits of increasing doing more for himself, dtr verbalized that he can have 24/7 at home, they were do tube feedings from cans and doing suctioning at home.Attempted to answer dtr's questions as able or educated the correct person to talk with about them.   -           BADL Safety/Performance    Impairments, BADL Safety/Performance  balance;endurance/activity tolerance;coordination;motor control;motor planning;pain;shortness of breath;strength;trunk/postural control  -           General ROM    GENERAL ROM COMMENTS  BUE WFL   -           MMT (Manual Muscle Testing)    General MMT Comments  BUE  grossly 4-/5; BUE 4-/5   -BH           Light Touch Sensation Assessment    Comment, Left Upper Extremity: Light Touch Sensation Assessment  grossly WFL; reports thumb on finger on left   -        Comment, Right Upper Extremity: Light Touch Sensation Assessment  grossly WFL   -           Positioning and Restraints    Pre-Treatment Position  in bed  -BH        Post Treatment Position  chair  -        In Chair  notified nsg;reclined;sitting;call light within reach;encouraged to call for assist;with  family/caregiver  -           Pain Scale: Numbers Pre/Post-Treatment    Pain Scale: Numbers, Pretreatment  3/10  -        Pain Scale: Numbers, Post-Treatment  3/10  -        Pain Location  abdomen  -        Pre/Post Treatment Pain Comment  indicated for tube feeding   -        Pain Intervention(s)  Repositioned;Ambulation/increased activity;Distraction;Emotional support;Rest  -           Wound 07/17/19 2144 abdomen incision    Wound - Properties Group Date first assessed: 07/17/19  -EL Time first assessed: 2144  -EL Present On Admission : no  -EL Location: abdomen  -EL Type: incision  -EL, laparoscopic x3        Wound 07/17/19 1005 midline abdomen incision;surgical    Wound - Properties Group Date first assessed: 07/17/19  -EL Time first assessed: 1005  -EL Orientation: midline  -EL Location: abdomen  -EL Type: incision;surgical  -EL       Wound 07/19/19 0800 Left anterior thigh other (see comments)    Wound - Properties Group Date first assessed: 07/19/19  -RG Time first assessed: 0800  -RG Present On Admission : yes;picture taken  -RG Side: Left  -RG Orientation: anterior  -RG Location: thigh  -RG Type: other (see comments)  -RG, skin graft sight.         Plan of Care Review    Plan of Care Reviewed With  patient;daughter  -           Clinical Impression (OT)    Date of Referral to OT  08/03/19  -        OT Diagnosis  Impaired mobility and ADL   -        Prognosis (OT Eval)  fair  -        Functional Level at Time of Evaluation (OT Eval)  pt decreased strength, endurance, safety and independence with ADL and functional tasks.   -        Patient/Family Goals Statement (OT Eval)  to go home to [facility]  -        Criteria for Skilled Therapeutic Interventions Met (OT Eval)  yes;treatment indicated  -        Rehab Potential (OT Eval)  fair, will monitor progress closely  -        Therapy Frequency (OT Eval)  other (see comments) 5-7 days a week   -        Predicted Duration of Therapy  Intervention (Therapy Eval)  until d/c   -BH        Care Plan Review (OT)  evaluation/treatment results reviewed;care plan/treatment goals reviewed;risks/benefits reviewed;current/potential barriers reviewed;patient/other agree to care plan  -        Care Plan Review, Other Participant (OT Eval)  daughter  -        Anticipated Equipment Needs at Discharge (OT)  bedside commode;front wheeled walker  -        Anticipated Discharge Disposition (OT)  anticipate therapy at next level of care;home with 24/7 care;home with home health depends on progress and medical status   -           Vital Signs    Pre Systolic BP Rehab  117  -BH        Pre Treatment Diastolic BP  56  -BH        Intra Systolic BP Rehab  124  -BH        Intra Treatment Diastolic BP  68  -BH        Post Systolic BP Rehab  136  -BH        Post Treatment Diastolic BP  73  -BH        Pretreatment Heart Rate (beats/min)  104  -BH        Posttreatment Heart Rate (beats/min)  93  -BH        Pre SpO2 (%)  100  -BH        O2 Delivery Pre Treatment  trach collar  -BH        Intra SpO2 (%)  98  -BH        O2 Delivery Intra Treatment  trach collar  -BH        Post SpO2 (%)  100  -BH        O2 Delivery Post Treatment  trach collar  -BH        Pre Patient Position  Supine  -BH        Intra Patient Position  Sitting  -BH        Post Patient Position  Sitting  -BH           Planned OT Interventions    Planned Therapy Interventions (OT Eval)  activity tolerance training;adaptive equipment training;BADL retraining;cognitive/visual perception retraining;functional balance retraining;IADL retraining;neuromuscular control/coordination retraining;occupation/activity based interventions;passive ROM/stretching;patient/caregiver education/training;ROM/therapeutic exercise;strengthening exercise;transfer/mobility retraining  -           Living Environment    Home Accessibility  stairs to enter home  -          User Key  (r) = Recorded By, (t) = Taken By, (c) =  Cosigned By    Initials Name Effective Dates    Duran Mckenzien T, RN 06/05/17 -      Jayne Antunez, OTR/L 06/08/18 -     Xenia Hannah RN 10/17/16 -          Occupational Therapy Education     Title: PT OT SLP Therapies (In Progress)     Topic: Occupational Therapy (In Progress)     Point: ADL training (Done)     Description: Instruct learner(s) on proper safety adaptation and remediation techniques during self care or transfers.   Instruct in proper use of assistive devices.    Learning Progress Summary           Patient Acceptance, E, VU,NR by  at 8/3/2019  2:45 PM    Comment:  Educated about OT and POC. Educated to call for assist and not get up on his own. Educated on need for assist with t/f, mobility and functional tasks. Educated on home safety with ADL need for BSC and RW.    Acceptance, E, VU,NR by  at 7/30/2019 11:58 AM    Comment:  Educated about OT and POC. Educated to call for assist. Educated on safety throughout.    Acceptance, E, VU by  at 7/27/2019 12:40 PM    Acceptance, E,TB,D, NR by  at 7/26/2019  1:27 PM    Acceptance, E,TB,D, NR by  at 7/25/2019 12:58 PM    Acceptance, E, NR by  at 7/24/2019 11:48 AM    Acceptance, E,TB,D, NR by  at 7/23/2019  1:02 PM    Acceptance, E, NR by  at 7/18/2019  1:59 PM    Comment:  Educated about OT and POC. Educated on safety throughout. Educated to call for assist.   Family Acceptance, E, VU,NR by  at 8/3/2019  2:45 PM    Comment:  Educated about OT and POC. Educated to call for assist and not get up on his own. Educated on need for assist with t/f, mobility and functional tasks. Educated on home safety with ADL need for BSC and RW.                   Point: Home exercise program (In Progress)     Description: Instruct learner(s) on appropriate technique for monitoring, assisting and/or progressing therapeutic exercises/activities.    Learning Progress Summary           Patient Acceptance, E,TB,D, NR by  at 7/26/2019  1:27 PM     Acceptance, E,TB,D, NR by  at 7/25/2019 12:58 PM    Acceptance, E,TB,D, NR by  at 7/23/2019  1:02 PM                   Point: Precautions (Done)     Description: Instruct learner(s) on prescribed precautions during self-care and functional transfers.    Learning Progress Summary           Patient Acceptance, E, VU,NR by  at 8/3/2019  2:45 PM    Comment:  Educated about OT and POC. Educated to call for assist and not get up on his own. Educated on need for assist with t/f, mobility and functional tasks. Educated on home safety with ADL need for BSC and RW.    Acceptance, E, VU,NR by  at 7/30/2019 11:58 AM    Comment:  Educated about OT and POC. Educated to call for assist. Educated on safety throughout.    Acceptance, E,TB,D, NR by  at 7/26/2019  1:27 PM    Acceptance, E,TB,D, NR by  at 7/25/2019 12:58 PM    Acceptance, E,TB,D, NR by  at 7/23/2019  1:02 PM    Acceptance, E, NR by  at 7/18/2019  1:59 PM    Comment:  Educated about OT and POC. Educated on safety throughout. Educated to call for assist.   Family Acceptance, E, VU,NR by  at 8/3/2019  2:45 PM    Comment:  Educated about OT and POC. Educated to call for assist and not get up on his own. Educated on need for assist with t/f, mobility and functional tasks. Educated on home safety with ADL need for BSC and RW.                   Point: Body mechanics (In Progress)     Description: Instruct learner(s) on proper positioning and spine alignment during self-care, functional mobility activities and/or exercises.    Learning Progress Summary           Patient Acceptance, E,TB,D, NR by  at 7/26/2019  1:27 PM    Acceptance, E,TB,D, NR by  at 7/25/2019 12:58 PM    Acceptance, E, NR by  at 7/24/2019 11:48 AM    Acceptance, E,TB,D, NR by  at 7/23/2019  1:02 PM                               User Key     Initials Effective Dates Name Provider Type Discipline     06/08/18 -  Jayne Antunez, OTR/L Occupational Therapist OT     03/07/18 -   Kerrie Woodson COTA/L Occupational Therapy Assistant OT     03/07/18 -  Brenda Thomas COTA/LIANE Occupational Therapy Assistant OT                  OT Recommendation and Plan  Outcome Summary/Treatment Plan (OT)  Daily Summary of Progress (OT): progress toward functional goals as expected  Plan for Continued Treatment (OT): continue OT POC   Anticipated Equipment Needs at Discharge (OT): bedside commode, front wheeled walker  Anticipated Discharge Disposition (OT): anticipate therapy at next level of care, home with 24/7 care, home with home health(depends on progress and medical status )  Planned Therapy Interventions (OT Eval): activity tolerance training, adaptive equipment training, BADL retraining, cognitive/visual perception retraining, functional balance retraining, IADL retraining, neuromuscular control/coordination retraining, occupation/activity based interventions, passive ROM/stretching, patient/caregiver education/training, ROM/therapeutic exercise, strengthening exercise, transfer/mobility retraining  Therapy Frequency (OT Eval): other (see comments)(5-7 days a week )  Daily Summary of Progress (OT): progress toward functional goals as expected  Plan of Care Review  Plan of Care Reviewed With: patient, daughter  Plan of Care Reviewed With: patient, daughter  Outcome Summary: OT re-eval, co-eval with PT completed this date after t/f to ICU. Pt engaged well. Pt was CGA for sup to sit. Pt CGA for sit to stand but CGA of 2 with assist with all lines and tube for moiblity with RW from bed to chair. Spent time on home safety education with dtr and pt. Pt may need a BSC and rolling walker at d/c. Pt could benefit from further skilled OT to reach PLOF/max independence with ADL. Recommend 24/7 care and further therapy at d/c.     Outcome Measures     Row Name 08/03/19 0481             How much help from another is currently needed...    Putting on and taking off regular lower body clothing?  3  -       Bathing (including washing, rinsing, and drying)  3  -      Toileting (which includes using toilet bed pan or urinal)  3  -      Putting on and taking off regular upper body clothing  3  -      Taking care of personal grooming (such as brushing teeth)  4  -      Eating meals  1 tube feedings  -      AM-PAC 6 Clicks Score (OT)  17  -         Functional Assessment    Outcome Measure Options  AM-PAC 6 Clicks Daily Activity (OT)  -        User Key  (r) = Recorded By, (t) = Taken By, (c) = Cosigned By    Initials Name Provider Type     Jayne Antunez OTR/L Occupational Therapist          Time Calculation:   Time Calculation- OT     Row Name 08/03/19 1450             Time Calculation-     OT Start Time  1324  -      OT Stop Time  1414  -      OT Time Calculation (min)  50 min  -      Total Timed Code Minutes- OT  15 minute(s)  -      OT Received On  08/03/19  -      OT Goal Re-Cert Due Date  08/16/19  -        User Key  (r) = Recorded By, (t) = Taken By, (c) = Cosigned By    Initials Name Provider Type     Jayne Antunez OTR/L Occupational Therapist        Therapy Charges for Today     Code Description Service Date Service Provider Modifiers Qty    12068491685  OT THERAPEUTIC ACT EA 15 MIN 8/3/2019 Jayne Antunez OTR/L GO 1    [id number]  OT RE-EVAL 2 8/3/2019 Jayne Antunez OTR/L GO 1               GARRY Hudson/LIANE  8/3/2019

## 2020-07-23 NOTE — ANESTHESIA PROCEDURE NOTES
Central Line Placement    Performed by: Moose Quintero DO  Authorized by: Moose Quintero DO     Indications: vascular access, central pressure monitoring and need for vasopressors  Preanesthetic Checklist: patient identified, risks and benefits discussed, anesthesia consent, site marked, patient being monitored and timeout performed      Pre-procedure: All elements of maximal sterile barrier technique followed?  Yes    2% Chlorhexidine for cutaneous antisepsis, Hand hygiene performed prior to catheter insertion and Ultrasound guidance    Sterile Ultrasound Technique followed?: Yes            Procedure:   Prep:  Chlorhexidine    Orientation:  Left  Patient position:  Trendelenburg  Catheter type:  Double lumen  Catheter size:  9 Fr  Catheter length:  12 cm  Number of attempts:  1  Successful placement: Yes      Assessment:   Post-procedure:  Catheter secured, sterile dressing applied and sterile dressing with CHG applied  Assessment:  Blood return through all ports, guidewire removal verified, placement verified by x-ray and free fluid flow  Insertion:  Uncomplicated  Patient tolerance:  Patient tolerated the procedure well with no immediate complications

## 2020-07-23 NOTE — ANESTHESIA PROCEDURE NOTES
Central Line Placement    Performed by: Gunnar Sanchez DO  Authorized by: Gunnar Sanchez DO     Indications: vascular access (surgical venous access)  Preanesthetic Checklist: patient identified, risks and benefits discussed, anesthesia consent, site marked, patient being monitored and timeout performed      Pre-procedure: All elements of maximal sterile barrier technique followed?  Yes    2% Chlorhexidine for cutaneous antisepsis, Hand hygiene performed prior to catheter insertion and Ultrasound guidance    Sterile Ultrasound Technique followed?: Yes            Procedure:   Prep:  Chlorhexidine    Orientation:  Right  Patient position:  Trendelenburg  Catheter type:  Single lumen  Catheter size:  6 Fr  Catheter length:  10 cm  Number of attempts:  1  Successful placement: Yes      Assessment:   Post-procedure:  Catheter secured, sterile dressing applied and sterile dressing with CHG applied  Assessment:  Blood return through all ports, guidewire removal verified and free fluid flow  Insertion:  Uncomplicated  Patient tolerance:  Patient tolerated the procedure well with no immediate complications  Surgeon requested for procedure

## 2020-07-23 NOTE — ANESTHESIA PROCEDURE NOTES
Arterial Line Placement    Performed by: Lilly Beltre DO  Authorized by: Lilly Beltre DO     Pre-Procedure  Indications:  Arterial pressure monitoring and blood sampling  Preanesthetic Checklist: patient identified, risks and benefits discussed, anesthesia consent, site marked, patient being monitored, timeout performed and patient being monitored      Procedure:   Prep:  ChloraPrep  Seldinger Technique?: Yes    Orientation:  Right  Location:  Radial artery  Catheter size:  20 G  Number of attempts:  1    Assessment:   Post-procedure:  Line secured and sterile dressing applied  Patient Tolerance:  Patient tolerated the procedure well with no immediate complications  Comment:   Collateral perfusion verified

## 2020-07-23 NOTE — PERIOP NOTES
Your Child's Health  Five to Six-Year-Old Visit      Moriah Knox  July 13, 2020    Visit Vitals  BP (!) 98/54   Ht 3' 8.75\" (1.137 m)   Wt 19.2 kg   BMI 14.89 kg/m²     Weight: 42.4 lbs      YOUR CHILD'S 5 to 6 YEAR-OLD VISIT    School  Starting school is a major milestone for children and their family members. Good language and willingness or readiness to separate from parents easily are a couple of the most important skills indicating school readiness. Once a child is enrolled in school, parents need to keep in close contact with the teachers. Learning or developmental problems which had not been previously apparent may become evident in  or first grade. If your child had previously received some educational services or therapies in a  program, they may qualify for continued services in school. School systems are obligated to evaluate children if there are significant concerns about learning or developmental problems. If you need help accessing this type of evaluation, talk with your schools, your doctor, or you can call the Department of Public Instruction at ([phone] [phone] or visit their  website at http://dpi.wi.gov.    Help ensure your child's success at school by making sure they are well rested (a regular bedtime routine is important in this regard). Also, make sure that they have eaten (or have an opportunity to eat at school) every morning. Children who are tired or hungry are not in the best state to learn well! Make sure they are not over scheduled with afterschool activities (sports, clubs, other social activities)-- children need some unstructured downtime every day. As your child learns to read, set aside time daily to read together--it helps them learn and is a great way to spend family time together.     Social Development  Five and six-year-old children will be spending more time with friends and peers and away from their families. It is important to know the friends  Patient was tested for COVID on Sunday. Patients results show COVID not detected. Patient stated he has quarantined at home since his test and denies any signs or symptoms. and families that your child is spending time with.     Peers have a lot of influence on each other, and your child may be interacting with friends who do not have to follow the same rules that you have set for your child. Some rules may change as they get older, but being very clear and very consistent continues to be critical component of effective parenting. Children will frequently push the limits at this age to see what they can get away with--so it is important to regularly remind your child of appropriate rules and expectations that you have for them.     Help your child feel good about themselves by giving them praise for their accomplishments, doing things together, and listening to them without interrupting. Give them opportunities to gradually be more independent and responsible.    Continue to set a good example for them - be responsible in your own obligations, mean what you say, model appropriate behavior when you yourself are angry or upset, and show respect for others by being on time. When your child is angry or frustrated, talk to them about why they are feeling that way, what their options are and how to resolve conflicts appropriately. Physical aggression - hitting, biting, kicking, throwing things- should not be tolerated at this age; teach your children healthier ways to respond to upsetting situations and blow off steam.    Households with working parents and children school are busy! Try to devote mealtimes, bedtimes, and even driving time, to talk with your children-- keep phones and other electronic media turned off and focus on talking to each other.     Remind your children that they can tell you anything. It is especially important that they know to report you if they feel they are being teased or bullied. They should also be taught to report bullying that they witness to you or to a teacher. Any concerns about bullying should be addressed-talk to your teachers, administrators or  guidance counselors at the school to help with this issue. Good online resources regarding bullying are [address].gov and the American Academy of Pediatrics \"HealthyChildren.org\" website (search for \"Bullying\").     Dental  Your child should be brushing at least twice daily for 2 minutes at a time with a pea-sized amount of regular (fluoridated) toothpaste. Children this age can also be taught to floss their teeth, but they still need a parent’s help to make sure all their back teeth are brushed well. Look for a dentist if you do not already have one. Limiting candy, other sweets, juice and sticky/chewy foods is good for their dental health.     Nutrition  Your child will be making more of their own choices about what to eat, so keep plenty of nutritious foods in your home for meal and snack times. Make sure your child eats something healthy for breakfast every morning; this is proven to positively impact school performance and learning. Your child needs ~3 servings of good sources of calcium everyday; this can include lowfat (or skim) milk, yogurt, low fat cheese or foods which have been fortified with calcium. Children this age should get 600 IU of vitamin D daily which (along with appropriate calcium intake) ensures good bone health. Most people cannot meet their vitamin D needs with their usual diet, so a multivitamin with iron supplement continues to be appropriate for this age. (A pure vitamin D supplement with 400 or 600 IU is also okay.)    Overweight and obesity are very serious problems; teaching your growing child to make healthy choices is important, as is continuing to avoid unhealthy choices like greasy fast food, bagged snacks, sodas and sweet drinks, lots of juice, candies and sweets. Make unhealthy choices an occasional treat only; don’t keep them in your home, because your child will find them!    Physical Activity and Screen Time   A child who enjoys being physically active when they are young  will be more likely to stay active as they grow older. Set a goal of 60 minutes of physical activity every day--it can be all at once or broken up into shorter segments. Try to make it a family activity as much as possible.     Time watching television, playing on the computer or using any other form of electronic media is NOT physical activity. As your child learns to read and their fine motor skills improve, they are going to become very skilled at using the computer and Internet (and they are going to like it!). Setting limits and supervising media use is very important. Set a time limit for media use each day (and enforce it). Consider setting up child-specific browsers and creating a “Favorites” toolbar so your child can only get to approved websites. For suggestions on developing healthy media habits, do an internet search for “healthychildren media use plan” for recommendations from the American Academy of Pediatrics.  Also, don't allow snacking in front of the TV set-- that is another unhealthy habit that is easier to prevent than change!    And parents need to be a role model--if you are constantly on your phone in situations where you could be talking with or interacting with your child, you are teaching them that the phone takes priority over your interaction with them.        Safety  Car:  Until a child weighs at least 40 pounds, they are safest in a rear or forward-facing car seat with a 5-point harness. If your 5-point harness seat has a higher weight limit than 40 pounds, keep your child in it-- they are safest in these seats until they outgrow the 's recommended size limits. (There is not a specific height limit for most of these seats, but children are safe as long as the top of their ears are below the top/ back edge of the seat and their shoulders are below the highest shoulder strap slots.) When they do outgrow the 5-point harness seat limits, they should ride in a booster seat in the  backseat. High-backed booster seats should be used if there are low seat backs or no head rests in your car; backless boosters can be used if your car has high seat backs and head rests.    Street Safety: Teach your child how to be safe when standing outside waiting for a bus or walking to school. Children this age should always be supervised when crossing streets.     Biking: Children (and their parents) should wear properly fitted helmets when biking. Children this age are not safe riding in the street.    Water & Sun Safety: Swimming lessons are a good idea if your child does not know how to swim yet. Even if they can swim, constant supervision by an adult who know how to swim is a must. Remember to use sunscreen with an SPF of 15 or higher when outside and reapply after time in the water.  Your child should avoid prolonged time in the sun between 11 AM and 3 PM and wear hats, sunglasses and sun protection clothing.     Personal Safety: A parent’s safety is just as important as a child’s safety. Violence is common in many people’s lives. If you do not feel safe in your home or if a partner has ever hit, kicked, shoved or physically hurt you or your child, it is important for you to get help. Talk to your doctors or a . In [place], resources include [facility] (008-073-4886) and the [facility] (24 hour hotline is 615- 382-2068); the National Domestic Violence Hotline is 8-850-574-XEQE (3959).    Review with your child that certain body parts (the parts usually covered by a bathing suit) and behaviors are private. For safety purposes, make sure your child knows that they should never keep secrets from parents, and they should always report to you if any adult shows any interest in their private parts (or if an adult discussed or shared their own private parts with a child). Tell them they should talk to you if any adult is doing or saying anything that makes them  uncomfortable, especially if an adult is asking them to keep a secret.    Fires & Burns: Children this age are fascinated by fires and they can be very compulsive--so watch them very carefully around fires,  grills, and stoves. Make sure matches and lighters are safely stored out of reach and continue to keep all hot items out of reach. Have a family fire safety plan, including regular smoke detector (and carbon monoxide detector) battery checks, working fire extinguishers, and escape routes. It is important that children this age know what door they should go out of if the smoke alarm goes off, where to meet family members outside and the importance of not going back into a burning building.     Firearms: Children this age are not capable of understanding how dangerous firearms are and evidence shows a child is safest in a home where no firearms are stored. If there any hunters or firearm owners in your home or anywhere your child is visiting, make sure all weapons are safely stored: unloaded, securely locked and ammunition stored separately.   Smoking: Continue to protect your child from cigarette smoke; secondhand smoke increases the risk of heart and lung disease in your child. Vapors from e-cigarettes may also be harmful, so don’t use those around your child either. If you smoke and are ready to consider quitting, talk to your doctor. Nicotine replacement products can be very helpful in breaking this tough addiction. 5-800-QUIT-NOW is a national help line that can help you find resources; other resources can be found at cdc.gov.       MEDICATION FOR FEVER OR PAIN:   Acetaminophen liquid (e.g., Tylenol or Tempra) may be given every four hours as needed for pain or fever.  Acetaminophen liquid is less concentrated than the infant dropper bottle type.  Be sure to check which product CONCENTRATION you are using.    OLD Concentration INFANT Tylenol/Acetaminophen  Drops (80 MG/0.8 mL)    Child’s Weight: Dose:  36 -  47 pounds:   240 mg (3 droppers)  48 - 59 pounds:   320 mg (4 droppers)    NEW Concentration INFANT Tylenol/Acetaminophen  Drops (160 MG/5 mL)    Child’s Weight: Dose:  36 - 47 pounds:   240 mg (7.5 mL (1 1/2 Teaspoons))  48 - 59 pounds:   320 mg (10.0 mL (2 Teaspoons))    CHILDREN’S Tylenol/Acetaminophen  (160 MG/5 mL)    Child’s Weight: Dose:  36 - 47 pounds:   240 240 mg (7.5 mL (1 1/2 Teaspoons))  48 - 59 pounds:   320 mg (10.0 mL (2 Teaspoons))    CHILDREN’S Tylenol/Acetaminophen MELTAWAYS ( 80 MG tablets)    Child’s Weight:  Dose:  36 - 47 pounds:    240 mg (3 meltaway tablets)  48 - 59 pounds:    320 mg (4 meltaway tablets)    CHILDREN'S Ibuprofen liquid (e.g., Advil or Motrin) may be given every six hours as needed for pain or fever.    Child’s Weight:  Dose:  36 - 47 pounds:    150 MG (1 1/2 Teaspoons)  48 - 59 pounds  200 mg (2 Teaspoons)     Most Recent Immunizations   Administered Date(s) Administered   • DTaP 07/07/2015   • DTaP/Hep B/IPV 2014   • DTaP/IPV 04/15/2019   • HIB, Unspecified Formulation 07/07/2015   • Hep A, ped/adol, 2 dose 04/05/2016   • Hep B, adolescent or pediatric 2014   • Influenza, injectable, quadrivalent, preservative-free 11/17/2017   • Influenza, live, intranasal, quadrivalent 11/12/2018   • MMR 04/06/2015   • Measles Mumps Rubella Varicella 04/15/2019   • Pneumococcal Conjugate 13 valent 04/06/2015   • Rotavirus - pentavalent 2014   • Varicella 04/06/2015       If Moriah develops any of the following reactions within 72 hours after an immunization, notify your pediatrician by calling the pediatric phone nurse:  1.  A temperature of 105 degrees or above.  2.  More than 3 hours of continuous crying.  3.  A shrill, high-pitched cry.  4.  A pale, limp spell.  5.  A seizure or fainting spell.  In this case, you should call 911 or go immediately to the emergency room.      NEXT VISIT:  6 YEARS OF AGE    Thank you for entrusting your care to [facility].    Also, check out “Children’s Health” on the [facility] Blog for updates on timely topics regarding children’s health!

## 2020-07-23 NOTE — PROGRESS NOTES
Met with Turner Felty wife and Dr. Shannan Alcala. Update given. Encouraged wife to verbalize and offered emotional support. [facility] waiting room instructions. Wife is leaving the hospital and will return after she is contacted by the nurse.  Fidelina Flores RN

## 2020-07-23 NOTE — ANESTHESIA PROCEDURE NOTES
Peripheral Block    Performed by: Shawn Hebert DO  Authorized by: Shawn Hebert DO       Pre-procedure:    Indications: at surgeon's request and post-op pain management    Preanesthetic Checklist: patient identified, risks and benefits discussed, site marked, timeout performed, anesthesia consent given and patient being monitored      Block Type:   Block Type:  PECS I and PECS II  Laterality:  Right  Monitoring:  Standard ASA monitoring, continuous pulse ox, frequent vital sign checks, heart rate and oxygen  Injection Technique:  Single shot  Procedures: ultrasound guided    Patient Position: supine  Prep: chlorhexidine    Needle Type:  SonoPlex  Needle Gauge:  20 G  Needle Localization:  Ultrasound guidance    Assessment:  Number of attempts:  1  Injection Assessment:  Incremental injection every 5 mL, ultrasound image on chart, local visualized surrounding nerve on ultrasound, negative aspiration for blood and no intravascular symptoms  Patient tolerance:  Patient tolerated the procedure well with no immediate complications

## 2020-07-23 NOTE — PROGRESS NOTES
Left femoral arterial line discontinued. Manual pressure held for 10 minutes. Tolerated well. Placed on CPAP. Cardiac index better at 2.1.  PA pressure 43/26.,cvp 21.

## 2020-07-23 NOTE — H&P
Date of Surgery Update:  Daphnie Morrison was seen and examined. History and physical has been reviewed. The patient has been examined.  There have been no significant clinical changes since the completion of the originally dated History and Physical.    Signed By: Marcus Mcbride PA-C     July 23, 2020 7:45 AM

## 2020-07-23 NOTE — ANESTHESIA PREPROCEDURE EVALUATION
Relevant Problems   No relevant active problems       Anesthetic History   No history of anesthetic complications            Review of Systems / Medical History  Patient summary reviewed, nursing notes reviewed and pertinent labs reviewed    Pulmonary          Shortness of breath      Comments: Smoking Status: Former Smoker   Quit Smokin/10/88      Neuro/Psych   Within defined limits           Cardiovascular      Valvular problems/murmurs: mitral insufficiency            Exercise tolerance: <4 METS     GI/Hepatic/Renal     GERD: well controlled           Endo/Other             Other Findings   Comments:  L arm tendon repair/pinning             Physical Exam    Airway  Mallampati: II  TM Distance: > 6 cm  Neck ROM: normal range of motion   Mouth opening: Normal     Cardiovascular    Rhythm: regular  Rate: normal    Murmur, Mitral area     Dental  No notable dental hx       Pulmonary  Breath sounds clear to auscultation               Abdominal  GI exam deferred       Other Findings            Anesthetic Plan    ASA: 4  Anesthesia type: general    Monitoring Plan: Arterial line, JEANIE, BIS, CVP and [place]onica    Post procedure ventilation   Induction: Intravenous  Anesthetic plan and risks discussed with: Patient

## 2020-07-23 NOTE — PROGRESS NOTES
7/23/2020    INTENSIVIST PROGRESS NOTE:     Patient seen and evaluated, chart reviewed   47 yo male hx of severe MR  Today s/p MV repair  Now pt in CCU post op sedated, intubated    ROS: unable to obtain    Visit Vitals  /56   Pulse 80   Temp 97 °F (36.1 °C)   Resp 14   Ht 6' (1.829 m)   Wt 102.3 kg (225 lb 8.5 oz)   SpO2 100%   BMI 30.59 kg/m²       General: sedated, intubated  Eyes: anicteric  HEENT: ETT in place  Neck: FROM  CV: RRR  Lungs: clear  Abd: soft  : no flank pain  Ext: no edema  Skin: no rash  Musculoskeletal: normal inspection  Neuro: non focal    CXR: pending    Labs reviewed    A/P:  - post op vent management: wean and extubate per CTS protocol  - severe MR: s/p MV repair  - wean pressors per CTS protocol  - chest tubes in place  - glycemic control  - pain control  - wean sedation for extubation  - Will assist on disposition planning when stable for transfer  Ananda Fan MD

## 2020-07-23 NOTE — BRIEF OP NOTE
BRIEF OP NOTE  Pre-Op Diagnosis: SEVERE MITRAL REGURGITATION    Post-Op Diagnosis: SEVERE MITRAL REGURGITATION      Procedure:  MITRAL VALVE REPAIR 38mm CG Future Band Via Mini Right Thoracotomy             RIGHT FEMORAL ARTERIAL AND VENOUS CANNULATION    Surgeon: Shannan Alcala MD, Annabella Ferguson MD    Assistant(s): Anthony Kuhn PA-C    Anesthesia: General     Infusions: precedex, insulin, karthik      Estimated Blood Loss: 450cc    Cell Saver:  965cc    Specimens:   ID Type Source Tests Collected by Time Destination   1 : P2 Mitral Valve Leaflet Preservative Mitral Valve  Guerita Galeas MD 7/23/2020 3940 Pathology       Drains and pacing wires: 2 atrial wires, 1 bipolar ventricular wire, 1 nestor drain    Complications:  none    Findings: MR    Implants:   Implant Name Type Inv.  Item Serial No.  Lot No. LRB No. Used Action   CG Annuloplasty Band   [id number] [employer] [id number] Right 1 Implanted

## 2020-07-23 NOTE — ANESTHESIA POSTPROCEDURE EVALUATION
Procedure(s):  JEANIE BY DR PHILLIPS - MITRAL VALVE REPAIR  WITH RIGHT  THORACOTOMY - RIGHT GROIN CANNULATION. general    Anesthesia Post Evaluation        Patient location during evaluation: ICU  Note status: Sedated. Post-procedure mental status: Sedated. Pain management: adequate  Airway patency: patent  Anesthetic complications: no  Cardiovascular status: acceptable  Respiratory status: acceptable  Hydration status: acceptable  Comments: Planned postoperative ventilation in ICU post MVR  Post anesthesia nausea and vomiting:  none  Final Post Anesthesia Temperature Assessment:  Normothermia (36.0-37.5 degrees C)      INITIAL Post-op Vital signs:   Vitals Value Taken Time   /56 7/23/2020  2:22 PM   Temp 36.1 °C (97 °F) 7/23/2020  2:22 PM   Pulse 66 7/23/2020  2:30 PM   Resp 0 7/23/2020  2:30 PM   SpO2 100 % 7/23/2020  2:30 PM   Vitals shown include unvalidated device data.

## 2020-07-24 ENCOUNTER — APPOINTMENT (OUTPATIENT)
Dept: GENERAL RADIOLOGY | Age: 50
DRG: 220 | End: 2020-07-24
Attending: NURSE PRACTITIONER
Payer: COMMERCIAL

## 2020-07-24 LAB
ADMINISTERED INITIALS, ADMINIT: NORMAL
ALBUMIN SERPL-MCNC: 3.5 G/DL (ref 3.5–5)
ALBUMIN SERPL-MCNC: 3.5 G/DL (ref 3.5–5)
ALBUMIN/GLOB SERPL: 1.7 {RATIO} (ref 1.1–2.2)
ALBUMIN/GLOB SERPL: 1.8 {RATIO} (ref 1.1–2.2)
ALP SERPL-CCNC: 40 U/L (ref 45–117)
ALP SERPL-CCNC: 44 U/L (ref 45–117)
ALT SERPL-CCNC: 34 U/L (ref 12–78)
ALT SERPL-CCNC: 35 U/L (ref 12–78)
ANION GAP SERPL CALC-SCNC: 5 MMOL/L (ref 5–15)
ANION GAP SERPL CALC-SCNC: 8 MMOL/L (ref 5–15)
AST SERPL-CCNC: 128 U/L (ref 15–37)
AST SERPL-CCNC: 143 U/L (ref 15–37)
BILIRUB SERPL-MCNC: 2.9 MG/DL (ref 0.2–1)
BILIRUB SERPL-MCNC: 3 MG/DL (ref 0.2–1)
BUN SERPL-MCNC: 17 MG/DL (ref 6–20)
BUN SERPL-MCNC: 20 MG/DL (ref 6–20)
BUN/CREAT SERPL: 13 (ref 12–20)
BUN/CREAT SERPL: 13 (ref 12–20)
CALCIUM SERPL-MCNC: 7.9 MG/DL (ref 8.5–10.1)
CALCIUM SERPL-MCNC: 8.1 MG/DL (ref 8.5–10.1)
CHLORIDE SERPL-SCNC: 108 MMOL/L (ref 97–108)
CHLORIDE SERPL-SCNC: 112 MMOL/L (ref 97–108)
CO2 SERPL-SCNC: 24 MMOL/L (ref 21–32)
CO2 SERPL-SCNC: 25 MMOL/L (ref 21–32)
CREAT SERPL-MCNC: 1.33 MG/DL (ref 0.7–1.3)
CREAT SERPL-MCNC: 1.49 MG/DL (ref 0.7–1.3)
D50 ADMINISTERED, D50ADM: 0 ML
D50 ORDER, D50ORD: 0 ML
ERYTHROCYTE [DISTWIDTH] IN BLOOD BY AUTOMATED COUNT: 12.9 % (ref 11.5–14.5)
GLOBULIN SER CALC-MCNC: 2 G/DL (ref 2–4)
GLOBULIN SER CALC-MCNC: 2.1 G/DL (ref 2–4)
GLSCOM COMMENTS: NORMAL
GLUCOSE BLD STRIP.AUTO-MCNC: 101 MG/DL (ref 65–100)
GLUCOSE BLD STRIP.AUTO-MCNC: 101 MG/DL (ref 65–100)
GLUCOSE BLD STRIP.AUTO-MCNC: 102 MG/DL (ref 65–100)
GLUCOSE BLD STRIP.AUTO-MCNC: 102 MG/DL (ref 65–100)
GLUCOSE BLD STRIP.AUTO-MCNC: 103 MG/DL (ref 65–100)
GLUCOSE BLD STRIP.AUTO-MCNC: 104 MG/DL (ref 65–100)
GLUCOSE BLD STRIP.AUTO-MCNC: 105 MG/DL (ref 65–100)
GLUCOSE BLD STRIP.AUTO-MCNC: 106 MG/DL (ref 65–100)
GLUCOSE BLD STRIP.AUTO-MCNC: 112 MG/DL (ref 65–100)
GLUCOSE BLD STRIP.AUTO-MCNC: 121 MG/DL (ref 65–100)
GLUCOSE BLD STRIP.AUTO-MCNC: 122 MG/DL (ref 65–100)
GLUCOSE BLD STRIP.AUTO-MCNC: 124 MG/DL (ref 65–100)
GLUCOSE BLD STRIP.AUTO-MCNC: 140 MG/DL (ref 65–100)
GLUCOSE BLD STRIP.AUTO-MCNC: 157 MG/DL (ref 65–100)
GLUCOSE BLD STRIP.AUTO-MCNC: 166 MG/DL (ref 65–100)
GLUCOSE BLD STRIP.AUTO-MCNC: 170 MG/DL (ref 65–100)
GLUCOSE BLD STRIP.AUTO-MCNC: 176 MG/DL (ref 65–100)
GLUCOSE BLD STRIP.AUTO-MCNC: 83 MG/DL (ref 65–100)
GLUCOSE BLD STRIP.AUTO-MCNC: 92 MG/DL (ref 65–100)
GLUCOSE SERPL-MCNC: 111 MG/DL (ref 65–100)
GLUCOSE SERPL-MCNC: 200 MG/DL (ref 65–100)
GLUCOSE, GLC: 101 MG/DL
GLUCOSE, GLC: 101 MG/DL
GLUCOSE, GLC: 102 MG/DL
GLUCOSE, GLC: 103 MG/DL
GLUCOSE, GLC: 104 MG/DL
GLUCOSE, GLC: 104 MG/DL
GLUCOSE, GLC: 105 MG/DL
GLUCOSE, GLC: 106 MG/DL
GLUCOSE, GLC: 112 MG/DL
GLUCOSE, GLC: 121 MG/DL
GLUCOSE, GLC: 122 MG/DL
GLUCOSE, GLC: 124 MG/DL
GLUCOSE, GLC: 140 MG/DL
GLUCOSE, GLC: 157 MG/DL
GLUCOSE, GLC: 166 MG/DL
GLUCOSE, GLC: 170 MG/DL
GLUCOSE, GLC: 176 MG/DL
GLUCOSE, GLC: 83 MG/DL
GLUCOSE, GLC: 92 MG/DL
HCT VFR BLD AUTO: 34.2 % (ref 36.6–50.3)
HGB BLD-MCNC: 11.7 G/DL (ref 12.1–17)
HIGH TARGET, HITG: 130 MG/DL
INSULIN ADMINSTERED, INSADM: 0.6 UNITS/HOUR
INSULIN ADMINSTERED, INSADM: 0.8 UNITS/HOUR
INSULIN ADMINSTERED, INSADM: 0.9 UNITS/HOUR
INSULIN ADMINSTERED, INSADM: 0.9 UNITS/HOUR
INSULIN ADMINSTERED, INSADM: 1.1 UNITS/HOUR
INSULIN ADMINSTERED, INSADM: 1.2 UNITS/HOUR
INSULIN ADMINSTERED, INSADM: 1.3 UNITS/HOUR
INSULIN ADMINSTERED, INSADM: 1.4 UNITS/HOUR
INSULIN ADMINSTERED, INSADM: 1.6 UNITS/HOUR
INSULIN ADMINSTERED, INSADM: 2.3 UNITS/HOUR
INSULIN ADMINSTERED, INSADM: 2.5 UNITS/HOUR
INSULIN ADMINSTERED, INSADM: 2.9 UNITS/HOUR
INSULIN ADMINSTERED, INSADM: 4.2 UNITS/HOUR
INSULIN ADMINSTERED, INSADM: 4.8 UNITS/HOUR
INSULIN ADMINSTERED, INSADM: 5.5 UNITS/HOUR
INSULIN ORDER, INSORD: 0.6 UNITS/HOUR
INSULIN ORDER, INSORD: 0.8 UNITS/HOUR
INSULIN ORDER, INSORD: 0.9 UNITS/HOUR
INSULIN ORDER, INSORD: 0.9 UNITS/HOUR
INSULIN ORDER, INSORD: 1.1 UNITS/HOUR
INSULIN ORDER, INSORD: 1.2 UNITS/HOUR
INSULIN ORDER, INSORD: 1.3 UNITS/HOUR
INSULIN ORDER, INSORD: 1.4 UNITS/HOUR
INSULIN ORDER, INSORD: 1.6 UNITS/HOUR
INSULIN ORDER, INSORD: 2.3 UNITS/HOUR
INSULIN ORDER, INSORD: 2.5 UNITS/HOUR
INSULIN ORDER, INSORD: 2.9 UNITS/HOUR
INSULIN ORDER, INSORD: 4.2 UNITS/HOUR
INSULIN ORDER, INSORD: 4.8 UNITS/HOUR
INSULIN ORDER, INSORD: 5.5 UNITS/HOUR
LOW TARGET, LOT: 95 MG/DL
MAGNESIUM SERPL-MCNC: 2.7 MG/DL (ref 1.6–2.4)
MCH RBC QN AUTO: 30.6 PG (ref 26–34)
MCHC RBC AUTO-ENTMCNC: 34.2 G/DL (ref 30–36.5)
MCV RBC AUTO: 89.5 FL (ref 80–99)
MINUTES UNTIL NEXT BG, NBG: 120 MIN
MINUTES UNTIL NEXT BG, NBG: 120 MIN
MINUTES UNTIL NEXT BG, NBG: 60 MIN
MULTIPLIER, MUL: 0.02
MULTIPLIER, MUL: 0.03
MULTIPLIER, MUL: 0.04
MULTIPLIER, MUL: 0.05
MULTIPLIER, MUL: 0.05
MULTIPLIER, MUL: 0.06
MULTIPLIER, MUL: 0.06
NRBC # BLD: 0 K/UL (ref 0–0.01)
NRBC BLD-RTO: 0 PER 100 WBC
ORDER INITIALS, ORDINIT: NORMAL
PLATELET # BLD AUTO: 112 K/UL (ref 150–400)
PMV BLD AUTO: 10.8 FL (ref 8.9–12.9)
POTASSIUM SERPL-SCNC: 3.7 MMOL/L (ref 3.5–5.1)
POTASSIUM SERPL-SCNC: 4.3 MMOL/L (ref 3.5–5.1)
PROT SERPL-MCNC: 5.5 G/DL (ref 6.4–8.2)
PROT SERPL-MCNC: 5.6 G/DL (ref 6.4–8.2)
RBC # BLD AUTO: 3.82 M/UL (ref 4.1–5.7)
SERVICE CMNT-IMP: ABNORMAL
SERVICE CMNT-IMP: NORMAL
SERVICE CMNT-IMP: NORMAL
SODIUM SERPL-SCNC: 140 MMOL/L (ref 136–145)
SODIUM SERPL-SCNC: 142 MMOL/L (ref 136–145)
WBC # BLD AUTO: 11.2 K/UL (ref 4.1–11.1)

## 2020-07-24 PROCEDURE — 80053 COMPREHEN METABOLIC PANEL: CPT

## 2020-07-24 PROCEDURE — P9045 ALBUMIN (HUMAN), 5%, 250 ML: HCPCS | Performed by: THORACIC SURGERY (CARDIOTHORACIC VASCULAR SURGERY)

## 2020-07-24 PROCEDURE — P9045 ALBUMIN (HUMAN), 5%, 250 ML: HCPCS | Performed by: NURSE PRACTITIONER

## 2020-07-24 PROCEDURE — 97530 THERAPEUTIC ACTIVITIES: CPT

## 2020-07-24 PROCEDURE — P9045 ALBUMIN (HUMAN), 5%, 250 ML: HCPCS

## 2020-07-24 PROCEDURE — 74011000250 HC RX REV CODE- 250: Performed by: THORACIC SURGERY (CARDIOTHORACIC VASCULAR SURGERY)

## 2020-07-24 PROCEDURE — 97165 OT EVAL LOW COMPLEX 30 MIN: CPT

## 2020-07-24 PROCEDURE — 74011250636 HC RX REV CODE- 250/636: Performed by: NURSE PRACTITIONER

## 2020-07-24 PROCEDURE — 71045 X-RAY EXAM CHEST 1 VIEW: CPT

## 2020-07-24 PROCEDURE — 74011250636 HC RX REV CODE- 250/636: Performed by: THORACIC SURGERY (CARDIOTHORACIC VASCULAR SURGERY)

## 2020-07-24 PROCEDURE — 74011000250 HC RX REV CODE- 250: Performed by: NURSE PRACTITIONER

## 2020-07-24 PROCEDURE — 65620000000 HC RM CCU GENERAL

## 2020-07-24 PROCEDURE — 74011250636 HC RX REV CODE- 250/636: Performed by: ANESTHESIOLOGY

## 2020-07-24 PROCEDURE — 74011250636 HC RX REV CODE- 250/636

## 2020-07-24 PROCEDURE — 83735 ASSAY OF MAGNESIUM: CPT

## 2020-07-24 PROCEDURE — 74011250637 HC RX REV CODE- 250/637: Performed by: NURSE PRACTITIONER

## 2020-07-24 PROCEDURE — 77010033678 HC OXYGEN DAILY

## 2020-07-24 PROCEDURE — 82962 GLUCOSE BLOOD TEST: CPT

## 2020-07-24 PROCEDURE — 97162 PT EVAL MOD COMPLEX 30 MIN: CPT

## 2020-07-24 PROCEDURE — 85027 COMPLETE CBC AUTOMATED: CPT

## 2020-07-24 PROCEDURE — 36415 COLL VENOUS BLD VENIPUNCTURE: CPT

## 2020-07-24 RX ORDER — ALBUMIN HUMAN 50 G/1000ML
12.5 SOLUTION INTRAVENOUS ONCE
Status: DISCONTINUED | OUTPATIENT
Start: 2020-07-24 | End: 2020-07-24

## 2020-07-24 RX ORDER — SODIUM CHLORIDE 0.9 % (FLUSH) 0.9 %
5-40 SYRINGE (ML) INJECTION EVERY 8 HOURS
Status: DISCONTINUED | OUTPATIENT
Start: 2020-07-24 | End: 2020-07-29 | Stop reason: HOSPADM

## 2020-07-24 RX ORDER — HYDROMORPHONE HYDROCHLORIDE 1 MG/ML
0.2 INJECTION, SOLUTION INTRAMUSCULAR; INTRAVENOUS; SUBCUTANEOUS
Status: DISCONTINUED | OUTPATIENT
Start: 2020-07-24 | End: 2020-07-24

## 2020-07-24 RX ORDER — POTASSIUM CHLORIDE 29.8 MG/ML
20 INJECTION INTRAVENOUS
Status: COMPLETED | OUTPATIENT
Start: 2020-07-24 | End: 2020-07-25

## 2020-07-24 RX ORDER — OXYCODONE AND ACETAMINOPHEN 5; 325 MG/1; MG/1
1 TABLET ORAL AS NEEDED
Status: DISCONTINUED | OUTPATIENT
Start: 2020-07-24 | End: 2020-07-24

## 2020-07-24 RX ORDER — DIPHENHYDRAMINE HYDROCHLORIDE 50 MG/ML
12.5 INJECTION, SOLUTION INTRAMUSCULAR; INTRAVENOUS AS NEEDED
Status: DISCONTINUED | OUTPATIENT
Start: 2020-07-24 | End: 2020-07-24

## 2020-07-24 RX ORDER — ALBUMIN HUMAN 250 G/1000ML
12.5 SOLUTION INTRAVENOUS ONCE
Status: DISCONTINUED | OUTPATIENT
Start: 2020-07-24 | End: 2020-07-24

## 2020-07-24 RX ORDER — ALBUMIN HUMAN 50 G/1000ML
12.5 SOLUTION INTRAVENOUS ONCE
Status: COMPLETED | OUTPATIENT
Start: 2020-07-24 | End: 2020-07-24

## 2020-07-24 RX ORDER — ALBUMIN HUMAN 50 G/1000ML
25 SOLUTION INTRAVENOUS ONCE
Status: COMPLETED | OUTPATIENT
Start: 2020-07-24 | End: 2020-07-24

## 2020-07-24 RX ORDER — ALBUMIN HUMAN 50 G/1000ML
SOLUTION INTRAVENOUS
Status: COMPLETED
Start: 2020-07-24 | End: 2020-07-24

## 2020-07-24 RX ORDER — MORPHINE SULFATE 10 MG/ML
2 INJECTION, SOLUTION INTRAMUSCULAR; INTRAVENOUS
Status: DISCONTINUED | OUTPATIENT
Start: 2020-07-24 | End: 2020-07-24

## 2020-07-24 RX ORDER — DIPHENHYDRAMINE HCL 25 MG
25 CAPSULE ORAL
Status: DISCONTINUED | OUTPATIENT
Start: 2020-07-24 | End: 2020-07-29 | Stop reason: HOSPADM

## 2020-07-24 RX ORDER — AMIODARONE HCL/D5W 450 MG/250
0.5 PLASTIC BAG, INJECTION (ML) INTRAVENOUS CONTINUOUS
Status: DISCONTINUED | OUTPATIENT
Start: 2020-07-24 | End: 2020-07-24

## 2020-07-24 RX ORDER — ALBUMIN HUMAN 50 G/1000ML
12.5 SOLUTION INTRAVENOUS ONCE
Status: ACTIVE | OUTPATIENT
Start: 2020-07-24 | End: 2020-07-25

## 2020-07-24 RX ORDER — FENTANYL CITRATE 50 UG/ML
25 INJECTION, SOLUTION INTRAMUSCULAR; INTRAVENOUS
Status: DISCONTINUED | OUTPATIENT
Start: 2020-07-24 | End: 2020-07-24

## 2020-07-24 RX ORDER — SODIUM CHLORIDE, SODIUM LACTATE, POTASSIUM CHLORIDE, CALCIUM CHLORIDE 600; 310; 30; 20 MG/100ML; MG/100ML; MG/100ML; MG/100ML
75 INJECTION, SOLUTION INTRAVENOUS CONTINUOUS
Status: DISCONTINUED | OUTPATIENT
Start: 2020-07-24 | End: 2020-07-24

## 2020-07-24 RX ORDER — LANOLIN ALCOHOL/MO/W.PET/CERES
3 CREAM (GRAM) TOPICAL
Status: DISCONTINUED | OUTPATIENT
Start: 2020-07-24 | End: 2020-07-29 | Stop reason: HOSPADM

## 2020-07-24 RX ORDER — AMIODARONE HCL/D5W 450 MG/250
1 PLASTIC BAG, INJECTION (ML) INTRAVENOUS CONTINUOUS
Status: DISCONTINUED | OUTPATIENT
Start: 2020-07-24 | End: 2020-07-24

## 2020-07-24 RX ORDER — ONDANSETRON 2 MG/ML
4 INJECTION INTRAMUSCULAR; INTRAVENOUS AS NEEDED
Status: DISCONTINUED | OUTPATIENT
Start: 2020-07-24 | End: 2020-07-26 | Stop reason: HOSPADM

## 2020-07-24 RX ORDER — SODIUM CHLORIDE 0.9 % (FLUSH) 0.9 %
5-40 SYRINGE (ML) INJECTION AS NEEDED
Status: DISCONTINUED | OUTPATIENT
Start: 2020-07-24 | End: 2020-07-29 | Stop reason: HOSPADM

## 2020-07-24 RX ORDER — SODIUM CHLORIDE 9 MG/ML
50 INJECTION, SOLUTION INTRAVENOUS CONTINUOUS
Status: DISCONTINUED | OUTPATIENT
Start: 2020-07-24 | End: 2020-07-25

## 2020-07-24 RX ORDER — MIDAZOLAM HYDROCHLORIDE 1 MG/ML
0.5 INJECTION, SOLUTION INTRAMUSCULAR; INTRAVENOUS
Status: DISCONTINUED | OUTPATIENT
Start: 2020-07-24 | End: 2020-07-24

## 2020-07-24 RX ADMIN — WATER 2 G: 1 INJECTION INTRAMUSCULAR; INTRAVENOUS; SUBCUTANEOUS at 02:43

## 2020-07-24 RX ADMIN — Medication 10 ML: at 21:14

## 2020-07-24 RX ADMIN — ACETAMINOPHEN 650 MG: 325 TABLET ORAL at 12:54

## 2020-07-24 RX ADMIN — Medication 10 ML: at 15:09

## 2020-07-24 RX ADMIN — ALBUMIN HUMAN 12.5 G: 50 SOLUTION INTRAVENOUS at 02:15

## 2020-07-24 RX ADMIN — POTASSIUM CHLORIDE 20 MEQ: 29.8 INJECTION, SOLUTION INTRAVENOUS at 17:10

## 2020-07-24 RX ADMIN — POLYETHYLENE GLYCOL 3350 17 G: 17 POWDER, FOR SOLUTION ORAL at 09:22

## 2020-07-24 RX ADMIN — ACETAMINOPHEN 650 MG: 325 TABLET ORAL at 09:02

## 2020-07-24 RX ADMIN — ACETAMINOPHEN 650 MG: 325 TABLET ORAL at 21:11

## 2020-07-24 RX ADMIN — WATER 2 G: 1 INJECTION INTRAMUSCULAR; INTRAVENOUS; SUBCUTANEOUS at 21:12

## 2020-07-24 RX ADMIN — MAGNESIUM OXIDE 400 MG (241.3 MG MAGNESIUM) TABLET 400 MG: TABLET at 17:17

## 2020-07-24 RX ADMIN — CHLORHEXIDINE GLUCONATE 10 ML: 1.2 RINSE ORAL at 21:13

## 2020-07-24 RX ADMIN — DOCUSATE SODIUM - SENNOSIDES 1 TABLET: 50; 8.6 TABLET, FILM COATED ORAL at 09:04

## 2020-07-24 RX ADMIN — Medication 10 ML: at 09:25

## 2020-07-24 RX ADMIN — ASPIRIN 81 MG CHEWABLE TABLET 81 MG: 81 TABLET CHEWABLE at 09:22

## 2020-07-24 RX ADMIN — ALBUMIN (HUMAN) 12.5 G: 12.5 INJECTION, SOLUTION INTRAVENOUS at 02:15

## 2020-07-24 RX ADMIN — POTASSIUM CHLORIDE 20 MEQ: 29.8 INJECTION, SOLUTION INTRAVENOUS at 15:09

## 2020-07-24 RX ADMIN — MUPIROCIN: 20 OINTMENT TOPICAL at 21:14

## 2020-07-24 RX ADMIN — WATER 2 G: 1 INJECTION INTRAMUSCULAR; INTRAVENOUS; SUBCUTANEOUS at 15:08

## 2020-07-24 RX ADMIN — ACETAMINOPHEN 650 MG: 325 TABLET ORAL at 04:03

## 2020-07-24 RX ADMIN — ACETAMINOPHEN 650 MG: 325 TABLET ORAL at 17:17

## 2020-07-24 RX ADMIN — ALBUMIN (HUMAN) 12.5 G: 12.5 INJECTION, SOLUTION INTRAVENOUS at 08:54

## 2020-07-24 RX ADMIN — KETOROLAC TROMETHAMINE 15 MG: 30 INJECTION, SOLUTION INTRAMUSCULAR at 09:18

## 2020-07-24 RX ADMIN — FAMOTIDINE 20 MG: 20 TABLET, FILM COATED ORAL at 21:12

## 2020-07-24 RX ADMIN — KETOROLAC TROMETHAMINE 15 MG: 30 INJECTION, SOLUTION INTRAMUSCULAR at 02:44

## 2020-07-24 RX ADMIN — ALBUMIN (HUMAN) 12.5 G: 12.5 INJECTION, SOLUTION INTRAVENOUS at 12:51

## 2020-07-24 RX ADMIN — MUPIROCIN: 20 OINTMENT TOPICAL at 09:05

## 2020-07-24 RX ADMIN — WATER 2 G: 1 INJECTION INTRAMUSCULAR; INTRAVENOUS; SUBCUTANEOUS at 09:22

## 2020-07-24 RX ADMIN — CHLORHEXIDINE GLUCONATE 10 ML: 1.2 RINSE ORAL at 08:59

## 2020-07-24 RX ADMIN — FAMOTIDINE 20 MG: 20 TABLET, FILM COATED ORAL at 09:04

## 2020-07-24 RX ADMIN — SODIUM CHLORIDE 50 ML/HR: 900 INJECTION, SOLUTION INTRAVENOUS at 04:07

## 2020-07-24 RX ADMIN — DOCUSATE SODIUM - SENNOSIDES 1 TABLET: 50; 8.6 TABLET, FILM COATED ORAL at 17:17

## 2020-07-24 RX ADMIN — ALBUMIN (HUMAN) 12.5 G: 12.5 INJECTION, SOLUTION INTRAVENOUS at 04:20

## 2020-07-24 NOTE — PROGRESS NOTES
Problem: Self Care Deficits Care Plan (Adult)  Goal: *Acute Goals and Plan of Care (Insert Text)  Description: FUNCTIONAL STATUS PRIOR TO ADMISSION: Independent. Works full time. Drives. Enjoys hunting and metal detecting. HOME SUPPORT: The patient lived with wife but did not require assist.    Occupational Therapy Goals  Initiated 7/24/2020  1. Patient will perform ADLs standing 10 mins without fatigue with Madison within 7 day(s). 2.  Patient will perform standing aspects of lower body ADLs with Madison within 7 day(s). 3.  Patient will perform gathering ADL items high and low 2/2 with Madison within 7 day(s). 4.  Patient will perform toilet transfers with Madison within 7 day(s). 5.  Patient will perform all aspects of toileting with Madison within 7 day(s). 6.  Patient will participate in cardiac upper extremity therapeutic exercise/activities to increase independence with ADLs with Madison for 5 minutes within 7 day(s). Outcome: Not Met   OCCUPATIONAL THERAPY EVALUATION  Patient: Daphnie Morrison (47 y.o. male)  Date: 7/24/2020  Primary Diagnosis: Mitral regurgitation [I34.0]  Procedure(s) (LRB):  JEANIE BY DR Fabby Zamora - MITRAL VALVE REPAIR  WITH RIGHT  THORACOTOMY - RIGHT GROIN CANNULATION (Right) 1 Day Post-Op   Precautions: Other (comment)(Monitor BP d/t hypotension)    ASSESSMENT  Based on the objective data described below, the patient presents with hypotension, GW, decreased activity tolerance and decreased functional mobility on POD #1 s/p MVR. See BP readings with activity below. Pt is most limited with performing UB ADLs given attachment to multiple lines/leads and GW following procedure. He was able to doff/don socks w/o assist sitting in chair. Pt performed BUE cardiac exercises with excellent form/technique and tolerance while sitting d/t low BP. Anticipate he will progress to Independent PLOF with continued acute skilled OT.  Do not anticipate he will require OT at D/C. Patient Vitals during session:     Pulse Resp BP Position   07/24/20 1100 80 16 98/58 Sitting   07/24/20 1031 80  105/56 Sitting   07/24/20 1030 80  96/53 Sitting   07/24/20 1027 80  (!) 86/45 Standing   07/24/20 1024 80  101/51 Sitting, feet on ground   07/24/20 1023 80  107/53 Sitting, legs elevated        Current Level of Function Impacting Discharge (ADLs/self-care):     Feeding: Setup  Oral Facial Hygiene/Grooming: Setup  Bathing: Moderate assistance  Upper Body Dressing: Maximum assistance(d/t lines/leads)  Lower Body Dressing: Minimum assistance  Toileting: Moderate assistance(needs assist with rear benji)    Functional Outcome Measure: The patient scored Total: 40/100 on the Barthel Index outcome measure which is indicative of 60% impaired ability to care for basic self needs/dependency on others; inferred 60% dependency on others for instrumental ADLs. Other factors to consider for discharge: Hypotension      Patient will benefit from skilled therapy intervention to address the above noted impairments. PLAN :  Recommendations and Planned Interventions: self care training, functional mobility training, therapeutic exercise, therapeutic activities, endurance activities, patient education, and home safety training    Frequency/Duration: Patient will be followed by occupational therapy 5 times a week to address goals. Recommendation for discharge: (in order for the patient to meet his/her long term goals)  No skilled occupational therapy/ follow up rehabilitation needs identified at this time.     This discharge recommendation:  Has been made in collaboration with the attending provider and/or case management    IF patient discharges home will need the following DME: TBD, likely none       SUBJECTIVE:   Patient stated \"I like to hunt and do metal detecting\"    OBJECTIVE DATA SUMMARY:   HISTORY:   Past Medical History:   Diagnosis Date    GERD (gastroesophageal reflux disease)     Mitral valve problem 2020     Past Surgical History:   Procedure Laterality Date    HX HEART CATHETERIZATION  01/2020    HX HEENT      wisdom teeth    HX ORTHOPAEDIC      L arm tendon repair/pinning    HX TONSILLECTOMY         Expanded or extensive additional review of patient history:     Home Situation  # Steps to Enter: 4  Rails to Enter: Yes  One/Two Story Residence: One story  Living Alone: No(lives with wife)  Support Systems: Spouse/Significant Other/Partner  Current DME Used/Available at Home: None  Tub or Shower Type: Tub/Shower combination    Hand dominance: Right    EXAMINATION OF PERFORMANCE DEFICITS:  Cognitive/Behavioral Status:  Neurologic State: Alert  Orientation Level: Oriented X4  Cognition: Follows commands             Skin: Lines intact    Edema: None    Hearing: Auditory  Auditory Impairment: None  Hearing Aids/Status: Does not own    Vision/Perceptual:                           Acuity: Within Defined Limits    Corrective Lenses: Reading glasses    Range of Motion:  AROM: Within functional limits                         Strength:  Strength: Generally decreased, functional                Coordination:  Coordination: Within functional limits  Fine Motor Skills-Upper: Left Intact; Right Intact    Gross Motor Skills-Upper: Left Intact; Right Intact    Tone & Sensation:  Tone: Normal  Sensation: Intact(grossly intact, w/ exception of L thumb numbness)                      Balance:  Sitting: Intact  Standing: Intact  Standing - Static: Good; Unsupported  Standing - Dynamic : (not tested d/t hypotension)    Functional Mobility and Transfers for ADLs:  Bed Mobility:  Rolling: (received seated in chair on arrival)    Transfers:  Sit to Stand: Stand-by assistance  Stand to Sit: Stand-by assistance  Bathroom Mobility: Stand-by assistance  Toilet Transfer : Stand-by assistance  Tub Transfer: Minimum assistance    ADL Assessment:  Feeding: Setup    Oral Facial Hygiene/Grooming: Setup    Bathing: Moderate assistance    Upper Body Dressing: Maximum assistance(d/t lines/leads)    Lower Body Dressing: Minimum assistance    Toileting: Moderate assistance(needs assist with rear benji)                ADL Intervention and task modifications:       Lower Body Dressing Assistance  Socks: Independent  Leg Crossed Method Used: Yes  Position Performed: Seated in chair            Functional Measure:  Barthel Index:    Bathin  Bladder: 0  Bowels: 10  Groomin  Dressin  Feeding: 10  Mobility: 0  Stairs: 0  Toilet Use: 5  Transfer (Bed to Chair and Back): 10  Total: 40/100        The Barthel ADL Index: Guidelines  1. The index should be used as a record of what a patient does, not as a record of what a patient could do. 2. The main aim is to establish degree of independence from any help, physical or verbal, however minor and for whatever reason. 3. The need for supervision renders the patient not independent. 4. A patient's performance should be established using the best available evidence. Asking the patient, friends/relatives and nurses are the usual sources, but direct observation and common sense are also important. However direct testing is not needed. 5. Usually the patient's performance over the preceding 24-48 hours is important, but occasionally longer periods will be relevant. 6. Middle categories imply that the patient supplies over 50 per cent of the effort. 7. Use of aids to be independent is allowed. Charmaine Denis., Barthel, D.W. ([id number]). Functional evaluation: the Barthel Index. [address] [facility] (14)2. CAROL Avalos, Jourdan Tolliver.[address] (). Measuring the change indisability after inpatient rehabilitation; comparison of the responsiveness of the Barthel Index and Functional [place] Measure. Journal of Neurology, Neurosurgery, and Psychiatry, 66(4), 081-295.   MANNIE Martinez, LING Gross, Karla Rivera M.A. (2004.) Assessment of post-stroke quality of life in cost-effectiveness studies: The usefulness of the Barthel Index and the EuroQoL-5D. Quality of Life Research, 15, 198-72         Occupational Therapy Evaluation Charge Determination   History Examination Decision-Making   LOW Complexity : Brief history review  LOW Complexity : 1-3 performance deficits relating to physical, cognitive , or psychosocial skils that result in activity limitations and / or participation restrictions  LOW Complexity : No comorbidities that affect functional and no verbal or physical assistance needed to complete eval tasks       Based on the above components, the patient evaluation is determined to be of the following complexity level: LOW   Pain Rating:  None    Activity Tolerance:   Fair and signs and symptoms of orthostatic hypotension  Please refer to the flowsheet for vital signs taken during this treatment. After treatment patient left in no apparent distress:    Sitting in chair and Call bell within reach    COMMUNICATION/EDUCATION:   The patients plan of care was discussed with: Physical therapist, Registered nurse, and Patient . Home safety education was provided and the patient/caregiver indicated understanding., Patient/family have participated as able in goal setting and plan of care. , and Patient/family agree to work toward stated goals and plan of care.     Thank you for this referral.  Ange Boyle, OTR/L  Time Calculation: 28 mins

## 2020-07-24 NOTE — PROGRESS NOTES
0700  Bedside and verbal report from Donn Schneider RN.   2054  Dr. Farida Pierce and team here to see patient. 0800  Assessment completed. Pulling 1200 ml on the IS.  0900  Took liquid diet well. 1000  Remains up in the chair. Wife at bedside. 1200  Reassessment completed; denies complaints of pain/discomfort. 1400  Remains in chair. 1500  PA line and arterial line removed. 1600  Reassessment completed. Assisted back to bed. Denies complaints of pain/discomfort. 1800  Took diet well. 1900  Urine output  425 ml. Chest tube output 510 ml. Bedside and verbal report given to Shawna Caceres RN.

## 2020-07-24 NOTE — PROGRESS NOTES
\[facility]\"" ICU Progress Note    Admit Date: 2020  POD:  1 Day Post-Op    Procedure:  Procedure(s):  JEANIE BY DR PHILLIPS - MITRAL VALVE REPAIR  WITH RIGHT  THORACOTOMY - RIGHT GROIN CANNULATION        Subjective:   Pt seen with Dr. Jeanne Cook. Tmax 99., on 4L NC. Up in chair. Syncopal episode when getting OOB. On insulin gtt, karthik 10, onq pump infusing. Sinus wilfredo 50s, SBP 80s CI 1.8. Pt feels \"woozy\". Objective:   Vitals:  Blood pressure 96/50, pulse 80, temperature 98.7 °F (37.1 °C), resp. rate 10, height 6' (1.829 m), weight 225 lb 8.5 oz (102.3 kg), SpO2 97 %. Temp (24hrs), Av.2 °F (36.8 °C), Min:96.6 °F (35.9 °C), Max:99.2 °F (37.3 °C)    Hemodynamics:   CO: CO (l/min): 5.8 l/min   CI: CI (l/min/m2): 2.6 l/min/m2   CVP: CVP (mmHg): 12 mmHg (20 08)   SVR: SVR (dyne*sec)/cm5: 1086 (dyne*sec)/cm5 (20 3472)   PAP Systolic: PAP Systolic: 26 (83/09/85 9762)   PAP Diastolic: PAP Diastolic: 15 (75/42/10 3510)   PVR:     SV02: SVO2 (%): 70 % (20 08)   SCV02: SCVO2 (%): 67 % (20)    EKG/Rhythm:  Sinus wilfredo 50s    Extubation Date / Time: 20     CT Output: 500 ml/24 hrs    Ventilator:  Ventilator Volumes  Vt Set (ml): 600 ml (20)  Vt Exhaled (Machine Breath) (ml): 523 ml (20)  Vt Spont (ml): 655 ml (20)  Ve Observed (l/min): 6.76 l/min (07/23/20 1613)    Oxygen Therapy:  Oxygen Therapy  O2 Sat (%): 97 % (20 0800)  Pulse via Oximetry: 80 beats per minute (20 0800)  O2 Device: Nasal cannula (20)  O2 Flow Rate (L/min): 4 l/min (20)  FIO2 (%): 50 % (20)    CXR:   CXR Results  (Last 48 hours)               20 0532  XR CHEST PORT Final result    Impression:  IMPRESSION:    1.  New oblong radiodensity along the right lateral thoracic wall projecting   over the chest tube likely represents pleural fluid.     2.  Interval advancement of the left IJ [place]-Veronica catheter which projects over a   likely segmental or possibly subsegmental arterial branch of the left lower   lobe. Recommend repositioning. The above findings were communicated to the patient's nurse, Margo Workman by Dr. José Luis Keith via phone 7/24/2020 at [phone]       Narrative:  EXAM:  XR CHEST PORT       INDICATION: postop heart       COMPARISON: One day prior. TECHNIQUE: Single frontal view of the chest.       FINDINGS: Interval extubation, removal of an enteric tube. There is a new oblong   radiodensity along the right lateral thoracic wall projecting over the chest   tube measuring 6.6 x 4.8 cm likely representing fluid. Interval advancement of a   left IJ approach [place]-Veronica catheter tip which projects over a likely segmental   or possibly subsegmental arterial branch of the left lower lobe. Mild persistent   bibasilar atelectasis. No visualized pneumothorax.           07/23/20 1437  XR CHEST PORT Final result    Impression:  IMPRESSION: ET tube is in satisfactory position. There is no pneumothorax. There   is bibasilar atelectasis right greater than left. Right chest tube extends to   the left of midline into the region of the AP window. Narrative:  EXAM:  XR CHEST PORT       INDICATION:  postop heart       COMPARISON:  2020       FINDINGS: A portable AP radiograph of the chest was obtained at 1426 hours. ET   tube is in satisfactory position. NG tube overlies the stomach. [place]-Veronica   catheter tip overlies main pulmonary artery. Right chest tube is noted in   position with the tip extending to the left of midline into the AP window. There is no pneumothorax. There is bibasilar atelectasis right greater than   left. [facility][note 3] Heart size is upper limits of normal. Patient status post mitral valve   repair.  .                  Admission Weight: Last Weight   Weight: 225 lb 8.5 oz (102.3 kg) Weight: 225 lb 8.5 oz (102.3 kg)     Intake / Output / Drain:  Current Shift: 07/24 0701 - 07/24 1900  In: -   Out: 135 [Urine:30; Drains:105]  Last 24 hrs.: Intake/Output Summary (Last 24 hours) at 2020 0814  Last data filed at 2020 0800  Gross per 24 hour   Intake 3674.7 ml   Output 2930 ml   Net 744.7 ml       EXAM:  General:   Alert, NAD                                                                                           Lungs:   Clear upper, diminished bases to auscultation bilaterally. Incision:  dsg cdi   Heart:  Regular rate and rhythm, S1, S2 normal, no murmur, click, rub or gallop. Abdomen:   Soft, non-tender. Bowel sounds hypoactive. No masses,  No organomegaly. Extremities:  No edema. PPP. Neurologic:  Gross motor and sensory apparatus intact. Labs:   Recent Labs     20  0739  20  0108  20  1438   WBC  --   --  11.2*  --  17.0*   HGB  --   --  11.7*   < > 13.9   HCT  --   --  34.2*   < > 39.9   PLT  --   --  112*  --  141*   NA  --   --  142   < > 142   K  --   --  4.3   < > 4.1   BUN  --   --  17   < > 14   CREA  --   --  1.33*   < > 1.26   GLU  --   --  111*   < > 108*   GLUCPOC 122*   < >  --    < >  --    INR  --   --   --   --  1.1    < > = values in this interval not displayed. Assessment:     Active Problems:    Mitral regurgitation (2020)      S/P MVR (mitral valve repair) (2020)      Overview: MITRAL VALVE REPAIR 38mm CG Future Band Via Mini Right Thoracotomy      RIGHT FEMORAL ARTERIAL AND VENOUS CANNULATION               Plan/Recommendations/Medical Decision Makin. MR s/p MVr mini right thoracotomy: On ASA, wean karthik for MAP >65, give 1 albumin. 2. Atelectasis: Encourage I/S, wean O2 for sats >92%  3. Post op anemia st acute blood loss: Monitor H&H and CT output  4. Thrombocytopenia: mild, monitor plt  5. Bradycardia: HR 50s and SBP 80s, pace AAI 80. Monitor. 6. Pain control: Cont onq, cont toradol  7. Elevated Cr: Cr 1.33, repeat labs at noon, may need to stop toradol. May have a component of CKD. Monitor. 8. Elevated Tbili: Tbili 1.6 preop, trend  9.  Leukocytosis: mild, monitor. 10. Dispo: PT/OT, keep in CCU today.      Signed By: Traci Chowdary NP

## 2020-07-24 NOTE — PROGRESS NOTES
Problem: Mobility Impaired (Adult and Pediatric)  Goal: *Acute Goals and Plan of Care (Insert Text)  Description: FUNCTIONAL STATUS PRIOR TO ADMISSION: Patient was independent and active without use of DME.     HOME SUPPORT PRIOR TO ADMISSION: The patient lived with family. Physical Therapy Goals  Initiated 7/24/2020  1. Patient will move from supine to sit and sit to supine  in bed with independence within 5 days. 2.  Patient will perform sit to/from stand with independence within 5 days. 3.  Patient will ambulate 400 feet with least restrictive assistive device and independence within 5 days. 4.  Patient will ascend/descend 12 stairs with 1 handrail(s) with modified independence within 5 days. 5.  Patient will perform cardiac exercises per protocol with independence within 5 days. Outcome: Progressing Towards Goal  PHYSICAL THERAPY EVALUATION  Patient: Brianna Montanez (76 y.o. male)  Date: 7/24/2020  Primary Diagnosis: Mitral regurgitation [I34.0]  Procedure(s) (LRB):  JEANIE BY DR Esa Corley - MITRAL VALVE REPAIR  WITH RIGHT  THORACOTOMY - RIGHT GROIN CANNULATION (Right) 1 Day Post-Op   Precautions: Other (comment)(Monitor BP d/t hypotension)    ASSESSMENT  Based on the objective data described below, the patient presents with hypotension, impaired balance, generalized weakness, decreased activity tolerance, and decreased functional mobility. Pt was received in the chair and cleared by nursing to mobilize. Limited by lines and hypotension. He was able to stand but not stand long enough for BP, he felt dizzy. He was returned to sitting and then performed UE exercises. Expect to progress well. Current Level of Function Impacting Discharge (mobility/balance): [facility]    Functional Outcome Measure: The patient scored 40/100 on the barthel outcome measure which is indicative of 60% impaired.       Other factors to consider for discharge:      Patient will benefit from skilled therapy intervention to address the above noted impairments. PLAN :  Recommendations and Planned Interventions: bed mobility training, transfer training, gait training, therapeutic exercises, patient and family training/education, and therapeutic activities      Frequency/Duration: Patient will be followed by physical therapy:  daily to address goals. Recommendation for discharge: (in order for the patient to meet his/her long term goals)  HHPT vs none    This discharge recommendation:  Has not yet been discussed the attending provider and/or case management    IF patient discharges home will need the following DME: to be determined (TBD)         SUBJECTIVE:   Patient stated i feel dizzy.     OBJECTIVE DATA SUMMARY:   HISTORY:    Past Medical History:   Diagnosis Date    GERD (gastroesophageal reflux disease)     Mitral valve problem 2020     Past Surgical History:   Procedure Laterality Date    HX HEART CATHETERIZATION  01/2020    HX HEENT      wisdom teeth    HX ORTHOPAEDIC      L arm tendon repair/pinning    HX TONSILLECTOMY         Personal factors and/or comorbidities impacting plan of care:     Home Situation  # Steps to Enter: 4  Rails to Enter: Yes  One/Two Story Residence: One story  Living Alone: No(lives with wife)  Support Systems: Spouse/Significant Other/Partner  Current DME Used/Available at Home: None  Tub or Shower Type: Tub/Shower combination    EXAMINATION/PRESENTATION/DECISION MAKING:   Critical Behavior:  Neurologic State: Alert  Orientation Level: Oriented X4  Cognition: Follows commands     Hearing:   Auditory  Auditory Impairment: None  Hearing Aids/Status: Does not own  Skin:  intact  Edema: none  Range Of Motion:  AROM: Within functional limits                       Strength:    Strength: Generally decreased, functional                    Tone & Sensation:   Tone: Normal              Sensation: Intact(grossly intact, w/ exception of L thumb numbness)               Coordination:  Coordination: Within functional limits  Vision:   Acuity: Within Defined Limits  Corrective Lenses: Reading glasses  Functional Mobility:  Bed Mobility:  Rolling: (received seated in chair on arrival)           Transfers:  Sit to Stand: Stand-by assistance  Stand to Sit: Stand-by assistance                       Balance:   Sitting: Intact  Standing: Intact  Standing - Static: Good; Unsupported  Standing - Dynamic : (not tested d/t hypotension)          Therapeutic Exercises:       EXERCISE   Sets   Reps   Active Active Assist   Passive Self ROM   Comments   Shoulder flexion 1 5 [x] [] [] []    Scapular elevation 1 5 [x] [] [] []    Scapular retraction 1 5 [x] [] [] []    Trunk rotation 1 5 [x] [] [] []    Lateral trunk flexion 1 5 [x] [] [] []          Functional Measure:  Barthel Index:    Bathin  Bladder: 0  Bowels: 10  Groomin  Dressin  Feeding: 10  Mobility: 0  Stairs: 0  Toilet Use: 5  Transfer (Bed to Chair and Back): 10  Total: 40/100       The Barthel ADL Index: Guidelines  1. The index should be used as a record of what a patient does, not as a record of what a patient could do. 2. The main aim is to establish degree of independence from any help, physical or verbal, however minor and for whatever reason. 3. The need for supervision renders the patient not independent. 4. A patient's performance should be established using the best available evidence. Asking the patient, friends/relatives and nurses are the usual sources, but direct observation and common sense are also important. However direct testing is not needed. 5. Usually the patient's performance over the preceding 24-48 hours is important, but occasionally longer periods will be relevant. 6. Middle categories imply that the patient supplies over 50 per cent of the effort. 7. Use of aids to be independent is allowed. Bright Barragan., Barthel, D.W. ([id number]). Functional evaluation: the Barthel Index. [address] [facility] (14)2.   CAROL Mcnally, Chuck, Ebb SHABBIR [place] (1999). Measuring the change indisability after inpatient rehabilitation; comparison of the responsiveness of the Barthel Index and Functional [place] Measure. Journal of Neurology, Neurosurgery, and Psychiatry, 66(4), 995-618. MANNIE Pak, LING Gross, & Nikki Patel M.A. (2004.) Assessment of post-stroke quality of life in cost-effectiveness studies: The usefulness of the Barthel Index and the EuroQoL-5D. Quality of Life Research, 15, 444-67            Physical Therapy Evaluation Charge Determination   History Examination Presentation Decision-Making   HIGH Complexity :3+ comorbidities / personal factors will impact the outcome/ POC  MEDIUM Complexity : 3 Standardized tests and measures addressing body structure, function, activity limitation and / or participation in recreation  MEDIUM Complexity : Evolving with changing characteristics  Other outcome measures barthel  MEDIUM      Based on the above components, the patient evaluation is determined to be of the following complexity level: MEDIUM        Activity Tolerance:   signs and symptoms of orthostatic hypotension  Please refer to the flowsheet for vital signs taken during this treatment. After treatment patient left in no apparent distress:   Sitting in chair and Call bell within reach    COMMUNICATION/EDUCATION:   The patients plan of care was discussed with: Occupational therapist and Registered nurse. Fall prevention education was provided and the patient/caregiver indicated understanding. and Patient/family agree to work toward stated goals and plan of care.     Thank you for this referral.  Amarjit Connelly, PT, DPT   Time Calculation: 25 mins

## 2020-07-24 NOTE — PROCEDURES
[address]  JEANIE    Name:  Ratna Piedra  MR#:  [id number]  :  1970  ACCOUNT #:  [de-identified]  DATE OF SERVICE:  2020    TRANSESOPHAGEAL ECHOCARDIOGRAPHIC REPORT    :  Amita Lubin MD    The transesophageal echocardiographic exam was requested by the surgeon in order to evaluate real-time cardiac and valvular form and function in a patient with known severe mitral regurgitation, scheduled for mitral valve replacement via a right thoracotomy. The transesophageal echocardiographic probe was easily and atraumatically inserted into the patient's esophagus while the patient was sedated inside the operating room under general anesthesia. Modalities incorporated included 2-D, 3-D, color-flow mode, pulsed-wave Doppler, and continuous wave Doppler. AORTA:  Ascending aorta: The patient's ascending aorta measured 2.6 cm in diameter. There was no evidence of dissection. There was minimal and nonmobile plaque. Aortic arch: The aortic arch measured 2.1 cm in diameter. There was no evidence of dissection. There was minimal and nonmobile plaque. Descending aorta: The descending aorta measured 1.8 cm in diameter. There was no evidence of dissection. There was minimal and nonmobile plaque. VALVES:  Aortic valve: The aortic valve annulus measured 2.3 cm. There was no aortic valve stenosis. There was no significant aortic insufficiency. The aortic leaflet morphology and motion were both normal.    Mitral valve: The mitral valve annulus measured 4.3 cm. There was no mitral valve stenosis. There was severe anteriorly located mitral insufficiency with a wall-hugging Coanda jet secondary to a P2 prolapse with flail. The anterior leaflet measured 2.8 cm. The posterior leaflet measured 3 cm and the C-Sept distance measured 2.7 cm. The anterior to posterior ratio was 0.9. The anterior and posterior leaflets were both thickened and myxomatous.     Tricuspid valve: The tricuspid valve annulus measured 4.1 cm. There was no tricuspid valve stenosis. There was trace tricuspid insufficiency. The tricuspid leaflet morphology and motion were both normal.    Pulmonic valve: The pulmonic valve annulus was within normal limits. There was no pulmonic stenosis. There was trace pulmonic insufficiency. The pulmonic leaflet morphology and motion were both normal.    ATRIA:  Right atrium:  Right atrial size was 3.8 cm. There was no spontaneous echo contrast.  There was no right atrial thrombus or tumor. A pulmonary artery catheter was visualized. Left atrium:  The left atrial size was 4.4 cm. There was no spontaneous echo contrast.  There was no left atrial thrombus or tumor. No device was visualized. Left atrial appendage: There was no thrombus visualized within the left atrial appendage. Pulsed-wave Doppler within the appendage measured 46 cm/sec. Interatrial septum:  The interatrial septal morphology was normal.  There was no patent foramen ovale with color-flow mode. VENTRICLES:  Right ventricle: The right ventricular cavity size was slightly dilated. The right ventricular major axis was 6 cm. There was borderline right ventricular hypertrophy. There was no right ventricular thrombus and the right ventricular ejection fraction was normal.    Left ventricle: The left ventricular cavity size was mildly dilated. There was borderline left ventricular hypertrophy. There was no left ventricular thrombus and the left ventricular ejection fraction was 55%. Interventricular septum:  The interventricular septum morphology was normal.    REGIONAL FUNCTION:  There were no isolated regional wall motion abnormalities. PERICARDIUM:  The pericardium was normal.    PLEURAE:  The pleurae were normal.    POST INTERVENTION FOLLOWUP STUDY:  After assistance with catheter placement, the patient was placed on cardiopulmonary bypass.   After separation from cardiopulmonary bypass and mitral valve repair with banding there was transient systolic anterior motion of the mitral valve with severe mitral regurgitation. With increasing volume expansion of the left ventricle the systolic anterior motion gradually resolved. There was no longer significant mitral regurgitation. The mean gradient through the mitral valve was 3 mmHg. The maximum velocity through the aortic valve was 87 cm/sec. The peak gradient through the aortic valve was 3 mmHg. The mean gradient through the aortic valve was 2 mmHg. There was no significant tricuspid regurgitation. There was trace pulmonic insufficiency and there was no significant aortic insufficiency. Both the left and right ventricular functions were intact and there were no significant effusions. These results were discussed and viewed with the cardiac surgeon, Dr. Lacey Hutchins and Dr. River Carter. The transesophageal echocardiographic probe was easily and atraumatically removed from the patient's esophagus. The patient tolerated the procedure without event.         Kate Aviles DO      [id number]  D:  07/23/2020 14:13  T:  07/24/2020 0:05  JOB #:  [id number]

## 2020-07-24 NOTE — DIABETES MGMT
[facility]  CLINICAL NURSE SPECIALIST CONSULT  PROGRAM FOR DIABETES HEALTH    INITIAL NOTE    Presentation   Laurell Brunner is a 48 y.o. male admitted after atypical chest pain, accompanied by fatigue. Significant cardiac workup in past. Admitted for surgical intervention. Current clinical course has been uncomplicated. DX: MR  TX: MR repair 7/23/20    Patient does not have diabetes but there is a family history of diabetes. A1c 5.5%. Consulted by Provider for advanced diabetes nursing assessment and care, specifically related to   [x] Inpatient management strategy    Subjective   I have a father and brother with diabetes.     Objective   Physical exam  General Alert, oriented and in no acute distress. Conversant and cooperative. Vital Signs Afebrile. Paced  Visit Vitals  /54   Pulse 80   Temp 98.9 °F (37.2 °C)   Resp 18   Ht 6' (1.829 m)   Wt 102.3 kg (225 lb 8.5 oz)   SpO2 100%   BMI 30.59 kg/m²     Laboratory  Lab Results   Component Value Date/Time    Hemoglobin A1c 5.5 07/16/2020 10:10 AM     No results found for: LDL, LDLC, DLDLP  Lab Results   Component Value Date/Time    Creatinine (POC) 1.5 (H) 01/05/2015 10:27 AM    Creatinine 1.49 (H) 07/24/2020 12:03 PM     Lab Results   Component Value Date/Time    Sodium 140 07/24/2020 12:03 PM    Potassium 3.7 07/24/2020 12:03 PM    Chloride 108 07/24/2020 12:03 PM    CO2 24 07/24/2020 12:03 PM    Anion gap 8 07/24/2020 12:03 PM    Glucose 200 (H) 07/24/2020 12:03 PM    BUN 20 07/24/2020 12:03 PM    Creatinine 1.49 (H) 07/24/2020 12:03 PM    BUN/Creatinine ratio 13 07/24/2020 12:03 PM    GFR est AA >60 07/24/2020 12:03 PM    GFR est non-AA 50 (L) 07/24/2020 12:03 PM    Calcium 7.9 (L) 07/24/2020 12:03 PM    Bilirubin, total 3.0 (H) 07/24/2020 12:03 PM    Alk.  phosphatase 40 (L) 07/24/2020 12:03 PM    Protein, total 5.6 (L) 07/24/2020 12:03 PM    Albumin 3.5 07/24/2020 12:03 PM    Globulin 2.1 07/24/2020 12:03 PM    A-G Ratio 1.7 07/24/2020 12:03 PM ALT (SGPT) 35 07/24/2020 12:03 PM     Lab Results   Component Value Date/Time    ALT (SGPT) 35 07/24/2020 12:03 PM       Factors affecting BG pattern  Factor Dose Comments   Nutrition:  Carb-controlled meals   60 grams/meal   Eating 50-75%   Pain  Intensity 0   Infection Ancef per protocol Afebrile. WBC 11.2     Blood glucose pattern        Assessment and Plan   Nursing Diagnosis Risk for unstable blood glucose pattern   Nursing Intervention Domain 5254 Decision-making Support   Nursing Interventions Examined current inpatient diabetes control   Explored factors facilitating and impeding inpatient management     Evaluation   This gentleman without diabetes has nearly perfect BG control on Glucostabilizer (GS). With A1c of 5.5% on admission, doubt this man will require anything to treat BG after transitioning off GS. Will monitor along. Recommendations   Recommend:    Glucostabilizer per protocol    Usual transition off insulin dosing    Billing Code(s)   I personally reviewed chart, notes, data and current medications in the medical record, and examined the patient at bedside before making care recommendations.      [x] [id number] IP subsequent hospital care - 15 minutes      HEMA Goetz  Program for Diabetes Health  Access via [employer]

## 2020-07-24 NOTE — PROGRESS NOTES
Cardiac Surgery Care Coordinator-  Met with Brianna Montanez. Reviewed plan of care and discussed goals for the day. Brianna Montanez has a good understanding of his plan for the day. Encouraged continued use of the incentive spirometer. Brianna Montanez can pull 2000 ml. Discussed possible discharge date and encouraged Brianna Montanez to verbalize. Will continue to follow for educational and emotional needs.  Lam Yung RN

## 2020-07-24 NOTE — OP NOTES
[address]  OPERATIVE REPORT    Name:  Ninfa Lombard  MR#:  [id number]  :  1970  ACCOUNT #:  [de-identified]  DATE OF SERVICE:  2020      PREOPERATIVE DIAGNOSES:  1. Severe symptomatic mitral regurgitation. 2.  Obesity with a height of 6 feet and weight of 102 kg. 3.  Gastroesophageal reflux disease. POSTOPERATIVE DIAGNOSES:  1. Severe symptomatic mitral regurgitation. 2.  Obesity with a height of 6 feet and weight of 102 kg. 3.  Gastroesophageal reflux disease. PROCEDURE PERFORMED:  1. Exposure of femoral artery and femoral vein for peripheral cannulation (to be dictated separately by co-surgeon, Dr. Alley Ovalles). 2.  Placement of femoral arterial line. 3.  Minimally invasive complex mitral valve repair of degenerative mitral disease. 4.  Transesophageal echocardiography. SURGEON:  Carly Milian MD    ASSISTANT:  Baldomero Nichole MD. Dr. Marika Pichardo presence was medically necessary given the need for simultaneous exposure of the femoral vessels and cannulation, as well as his expertise in mitral valve repair surgery. ASSISTANT:  Angie Andrews PA-C. The assistance of a PA was required due to the complexity of the surgery and groin exploration, exposure of the femoral vessels as well as expertise and assistance in cardiac surgery. ANESTHESIA:  General endotracheal.    ANESTHESIOLOGIST:  Opal Lima DO    COMPLICATIONS:  None. SPECIMENS REMOVED:  P2 leaflet. IMPLANTS:  #38 [employer] band. ESTIMATED BLOOD LOSS:  500 mL. DETAILS:  The patient is a 25-year-old gentleman who has had a history of severe symptomatic mitral regurgitation due to degenerative disease specifically P2 prolapse with ruptured chord. We also found P3 prolapse with a ruptured chord there as well. He was referred for mitral valve repair. We opted for a right thoracotomy approach. He was prepped and draped in a sterile fashion.   A time-out was performed. An incision was made under the right nipple just over the fourth rib. Simultaneously, the physician assistant and Dr. Kan Bueno began exposure of the right femoral artery and vein by femoral artery cutdown. A left femoral arterial line was also placed by the physician assistant. The femoral artery and vein were exposed by the PA with Dr. Lor Estrada supervision and cannulated, this will be dictated separately by Dr. Kan Bueno. The chest was entered through the fourth intercostal space. We placed an Edgard retractor as well as a mini retractor. We spread the ribs slightly. We made a counterincision in the eighth intercostal space and through this we passed our sump sucker in the same hole that later will be used for a chest tube site. We did set up the camera for additional exposure for the assistant, however, was blurry and not usable. We then opened the pericardium and identified our proper location. We then administered heparin. When we had therapeutic ACT, we went on cardiopulmonary bypass peripherally via the groin. We then dropped the lung completely. We then used Vicryl stay sutures to reflect the pericardium. We then brought the aorta into view. I placed a pursestring in the proximal aorta for the antegrade cardioplegia needle and root vent. I then created a space between the posterior aorta just north of the right PA for our cross clamp. We then performed relaxing maneuvers at the [facility] and IVC in order to improve our exposure of the left atrium. I then placed the aortic cross clamp across the distal ascending aorta. I administered antegrade cardioplegia down the aortic root. We had a prompt diastolic arrest.  We then opened the left atrium via Sondergaard's groove. We placed a sump sucker into the left atrium to drain this. We used an atrial lift retractor to expose the mitral valve. We had excellent visualization of the mitral apparatus.   I then placed my annular sutures from trigone to trigone and this helped pull the valve up into view even further. Dr. Ming Escobedo and I assessed the valve together. We identified the pathology at P2 as well as P3. There was clearly redundant tissue as well as ruptured chords at P2 and also P3. We resected the ruptured chords and performed a triangular resection of P2. I then performed a folding plasty of P2 as well as P3. This helped reduced the height of P2 and P3 to approximate the height of the adjacent P1. There were adjacent chordae tendineae to the ruptured chords on both P2 and P3, which provided support for the remnants of P2 and P3. Once the folding plasty had been completed on both P2 and P3, I sutured these together with a running 5-0 Prolene. We then performed an insufflation test, which showed that there was no leak, thus we were very satisfied with this. We then passed our sutures through a size 38 annuloplasty band. We then seated the band in place. We tested our repair again with a repeat insufflation test.  This again showed there was no residual leak. We seated the band in place. We secured the band of the Cor-Knot. We then closed the atrium in two layers. We then placed the atrial and ventricular pacing wires and evacuated any fluid or blood from the right chest.  We then removed the aortic cross clamp. We weaned from bypass with no ionotropic support. We had an excellent repair with no residual regurgitation. We administered protamine. We dried up our surgical sites. We closed the ribs with Vicryl sutures to close the intercostal space and closed the soft tissue in multiple layers. We placed two On-Q pump catheters over the ribs and one in the soft tissue. We also closed the groin in multiple layers as well. The right foot had excellent pulses after removal of the arterial cannula, which was supervised by me. The patient then received a pectoralis nerve block by Anesthesia.   The patient was then safely transported to the CCU in sinus rhythm.         MD DARBY Ku/[email or web address]  D:  07/23/2020 15:19  T:  07/24/2020 2:15  JOB #:  [id number]

## 2020-07-24 NOTE — PROGRESS NOTES
[address] with family and home health if indicated    Transportation will be provided by spouse and family at discharge and for follow up appointments    Preferred Rx is [facility] on [address] at [phone]    Follow up appointments will be needed prior to discharge    Reason for Admission:   Mitral Valve Regurgitation/scheduled MVR and Thoracotomy on 7/23/2020                   RUR Score:    11 RUR low                 Plan for utilizing home health:   Agreeable to home health if indicated       PCP: First and Last name:  José Manuel Ramirez MD   Name of Practice: [address] [facility]   Are you a current patient: Yes/No:  Yes   Approximate date of last visit:   10/2019   Can you participate in a virtual visit with your PCP: yes                    Current Advanced Directive/Advance Care Plan: per spouse patient does not have a ACP or MPOA                         Transition of Care Plan:   Home with family and possible home health     Care Management Interventions  PCP Verified by CM: Yes  Last Visit to PCP: 10/01/19  Mode of Transport at Discharge: Other (see comment)(Spouse will transport home and to follow up appointments)  Transition of Care Consult (CM Consult): Other(patient has been independent PTA but is open to home health if needed)  Discharge Durable Medical Equipment: No  Physical Therapy Consult: Yes(PT eval pending at this time)  Occupational Therapy Consult: No  Speech Therapy Consult: No  Current Support Network: Lives with Spouse, Family Lives Nearby(patient lives with spouse and son in a one level ranch home with 3 steps to enter and exit home.)  Confirm Follow Up Transport: [facility] spoke with spouse Ángel Nichole ([phone]) who verified patient's home address and birthdate. Spouse stated that patient has much family support within a 10 mile radius of home.  Spouse will be off of work for at least one week to assist patient when he is discharged and she has flexibility to take more time off if needed. Care manager will follow for discharge needs when discharged.     GIANCARLO Marx   Care Manager  Ext [phone]

## 2020-07-24 NOTE — OP NOTES
[address]  OPERATIVE REPORT    Name:  Alka Mixon  MR#:  [id number]  :  1970  ACCOUNT #:  [de-identified]  DATE OF SERVICE:  2020    PREOPERATIVE DIAGNOSIS:  Severe mitral regurgitation. POSTOPERATIVE DIAGNOSIS:  Severe mitral regurgitation. PROCEDURE PERFORMED:  Right groin exploration, cannulation of femoral artery and femoral vein, and repair of femoral artery and femoral vein. SURGEON:  Tate Gracia MD    ASSISTANT:  Katie Mclaughlin PA-C    ANESTHESIA:  General.    COMPLICATIONS:  None. SPECIMENS REMOVED:  None. IMPLANTS:  none. ESTIMATED BLOOD LOSS:  Minimal.    DESCRIPTION OF PROCEDURE:  This right groin exploration was done for concomitant mitral valve repair. See Dr. Marina Seals note for fluoro operative summary of the mitral valve repair. Following induction of endotracheal anesthesia and a surgical time out, the right groin was explored. The common femoral artery and common femoral vein were identified. A 25-Sierra Leonean venous drainage cannula was inserted into the right atrium under echocardiographic guidance. A 19-S[place] Leonean arterial infusion cannula was inserted under echocardiographic guidance following appropriate wire placement. At the completion of the procedure, the cannulas were withdrawn, the arteries were repaired, then the wound was reapproximated with interrupted and running Vicryl suture.         MD DAY Belle/[email or web address]  D:  2020 14:27  T:  2020 22:47  JOB #:  [id number]

## 2020-07-24 NOTE — PROGRESS NOTES
1920- bedside report rec'd from [facility], [address] [place] [address] and assumed care of pt.  1945 evaluate breathing and respiratory status. Pt On CPAP, awake, able to lift head off pillow. Preparing for extubation. 2000- Extubated to 4 LNC,  O2 sats-98-99% . voice strong, slightly hoarse, pt alert and oriented x3 . Drinking water and denies nausea at present. 0001- pt resting well, americo gtt weaning to 10 mcg/kg/min. Tolerating PO well. CO?CI continues to be low. 0100- some self terminating VTach noted on monitor. BP stable . AM labs drawn early and  sent to check electrolytes. 0200- CI continues to be 1.6-1.7 and continues to have some self terminating VT . Dr Stepan Mahmood paged to update on pt fluid status. 0330- giving additional albumin now and continue to monitor. 0600- pt VSS, pacer appeared to be competing and has been off and pt tolerating SB-5 rhythm. 2768- pt up to chair and tolerated transfer well. oon after sitting became dizzy, hot and became hypotensive. Americo gtt turned up to 50 mcg/min. Pt awakened after appx 5 seconds of unresponsiveness. CI 2.3 at present. O2 Sats-. Chest tube dumped appx 150ml with gettng up to chair also. 0700- bedside report given to [address] [place]- bedside rounds done and pt interacting.

## 2020-07-24 NOTE — PROGRESS NOTES
7/24/2020    INTENSIVIST PROGRESS NOTE:     Patient seen and evaluated, chart reviewed   47 yo male hx of severe MR  S/P MV repair 7/23  Extubated post op 7/23  No acute events overnight  Now pt in CCU awake, alert  No acute distress  No acute complaints    ROS: no sob, no CP    Visit Vitals  BP 96/63   Pulse (!) 56   Temp 99.1 °F (37.3 °C)   Resp 9   Ht 6' (1.829 m)   Wt 102.3 kg (225 lb 8.5 oz)   SpO2 100%   BMI 30.59 kg/m²       General: no distress  Eyes: anicteric  HEENT: clear  Neck: FROM  CV: RRR  Lungs: clear  Abd: soft  : no flank pain  Ext: no edema  Skin: no rash  Musculoskeletal: normal inspection  Neuro: non focal    CXR: right mid lung density peripheral    Labs reviewed    A/P:  - wean O2  - severe MR: s/p MV repair  - wean pressors per CTS protocol  - chest tubes in place  - glycemic control  - pain control  - advance diet  - mobilize  - Will assist on disposition planning when stable for transfer  Milton More MD

## 2020-07-25 ENCOUNTER — APPOINTMENT (OUTPATIENT)
Dept: GENERAL RADIOLOGY | Age: 50
DRG: 220 | End: 2020-07-25
Attending: NURSE PRACTITIONER
Payer: COMMERCIAL

## 2020-07-25 LAB
ADMINISTERED INITIALS, ADMINIT: NORMAL
ALBUMIN SERPL-MCNC: 3.4 G/DL (ref 3.5–5)
ALBUMIN/GLOB SERPL: 1.5 {RATIO} (ref 1.1–2.2)
ALP SERPL-CCNC: 39 U/L (ref 45–117)
ALT SERPL-CCNC: 35 U/L (ref 12–78)
ANION GAP SERPL CALC-SCNC: 3 MMOL/L (ref 5–15)
ANION GAP SERPL CALC-SCNC: 4 MMOL/L (ref 5–15)
AST SERPL-CCNC: 144 U/L (ref 15–37)
BILIRUB SERPL-MCNC: 2 MG/DL (ref 0.2–1)
BUN SERPL-MCNC: 14 MG/DL (ref 6–20)
BUN SERPL-MCNC: 16 MG/DL (ref 6–20)
BUN/CREAT SERPL: 14 (ref 12–20)
BUN/CREAT SERPL: 15 (ref 12–20)
CALCIUM SERPL-MCNC: 8 MG/DL (ref 8.5–10.1)
CALCIUM SERPL-MCNC: 8.1 MG/DL (ref 8.5–10.1)
CHLORIDE SERPL-SCNC: 106 MMOL/L (ref 97–108)
CHLORIDE SERPL-SCNC: 108 MMOL/L (ref 97–108)
CO2 SERPL-SCNC: 28 MMOL/L (ref 21–32)
CO2 SERPL-SCNC: 29 MMOL/L (ref 21–32)
CREAT SERPL-MCNC: 0.96 MG/DL (ref 0.7–1.3)
CREAT SERPL-MCNC: 1.11 MG/DL (ref 0.7–1.3)
D50 ADMINISTERED, D50ADM: 0 ML
D50 ORDER, D50ORD: 0 ML
ERYTHROCYTE [DISTWIDTH] IN BLOOD BY AUTOMATED COUNT: 13.1 % (ref 11.5–14.5)
GLOBULIN SER CALC-MCNC: 2.2 G/DL (ref 2–4)
GLSCOM COMMENTS: NORMAL
GLUCOSE BLD STRIP.AUTO-MCNC: 100 MG/DL (ref 65–100)
GLUCOSE BLD STRIP.AUTO-MCNC: 101 MG/DL (ref 65–100)
GLUCOSE BLD STRIP.AUTO-MCNC: 112 MG/DL (ref 65–100)
GLUCOSE BLD STRIP.AUTO-MCNC: 118 MG/DL (ref 65–100)
GLUCOSE BLD STRIP.AUTO-MCNC: 129 MG/DL (ref 65–100)
GLUCOSE BLD STRIP.AUTO-MCNC: 131 MG/DL (ref 65–100)
GLUCOSE BLD STRIP.AUTO-MCNC: 137 MG/DL (ref 65–100)
GLUCOSE BLD STRIP.AUTO-MCNC: 153 MG/DL (ref 65–100)
GLUCOSE BLD STRIP.AUTO-MCNC: 82 MG/DL (ref 65–100)
GLUCOSE BLD STRIP.AUTO-MCNC: 84 MG/DL (ref 65–100)
GLUCOSE BLD STRIP.AUTO-MCNC: 90 MG/DL (ref 65–100)
GLUCOSE BLD STRIP.AUTO-MCNC: 93 MG/DL (ref 65–100)
GLUCOSE BLD STRIP.AUTO-MCNC: 94 MG/DL (ref 65–100)
GLUCOSE BLD STRIP.AUTO-MCNC: 95 MG/DL (ref 65–100)
GLUCOSE BLD STRIP.AUTO-MCNC: 95 MG/DL (ref 65–100)
GLUCOSE BLD STRIP.AUTO-MCNC: 97 MG/DL (ref 65–100)
GLUCOSE SERPL-MCNC: 86 MG/DL (ref 65–100)
GLUCOSE SERPL-MCNC: 90 MG/DL (ref 65–100)
GLUCOSE, GLC: 100 MG/DL
GLUCOSE, GLC: 101 MG/DL
GLUCOSE, GLC: 112 MG/DL
GLUCOSE, GLC: 118 MG/DL
GLUCOSE, GLC: 137 MG/DL
GLUCOSE, GLC: 153 MG/DL
GLUCOSE, GLC: 82 MG/DL
GLUCOSE, GLC: 84 MG/DL
GLUCOSE, GLC: 90 MG/DL
GLUCOSE, GLC: 93 MG/DL
GLUCOSE, GLC: 94 MG/DL
GLUCOSE, GLC: 95 MG/DL
GLUCOSE, GLC: 95 MG/DL
GLUCOSE, GLC: 97 MG/DL
HCT VFR BLD AUTO: 29.9 % (ref 36.6–50.3)
HGB BLD-MCNC: 10 G/DL (ref 12.1–17)
HIGH TARGET, HITG: 130 MG/DL
INSULIN ADMINSTERED, INSADM: 0.4 UNITS/HOUR
INSULIN ADMINSTERED, INSADM: 0.6 UNITS/HOUR
INSULIN ADMINSTERED, INSADM: 0.7 UNITS/HOUR
INSULIN ADMINSTERED, INSADM: 0.8 UNITS/HOUR
INSULIN ADMINSTERED, INSADM: 0.9 UNITS/HOUR
INSULIN ADMINSTERED, INSADM: 1 UNITS/HOUR
INSULIN ADMINSTERED, INSADM: 1.3 UNITS/HOUR
INSULIN ADMINSTERED, INSADM: 1.3 UNITS/HOUR
INSULIN ADMINSTERED, INSADM: 1.5 UNITS/HOUR
INSULIN ADMINSTERED, INSADM: 1.6 UNITS/HOUR
INSULIN ADMINSTERED, INSADM: 2 UNITS/HOUR
INSULIN ADMINSTERED, INSADM: 2.2 UNITS/HOUR
INSULIN ADMINSTERED, INSADM: 2.6 UNITS/HOUR
INSULIN ADMINSTERED, INSADM: 2.9 UNITS/HOUR
INSULIN ORDER, INSORD: 0.4 UNITS/HOUR
INSULIN ORDER, INSORD: 0.6 UNITS/HOUR
INSULIN ORDER, INSORD: 0.7 UNITS/HOUR
INSULIN ORDER, INSORD: 0.8 UNITS/HOUR
INSULIN ORDER, INSORD: 0.9 UNITS/HOUR
INSULIN ORDER, INSORD: 1 UNITS/HOUR
INSULIN ORDER, INSORD: 1.3 UNITS/HOUR
INSULIN ORDER, INSORD: 1.3 UNITS/HOUR
INSULIN ORDER, INSORD: 1.5 UNITS/HOUR
INSULIN ORDER, INSORD: 1.6 UNITS/HOUR
INSULIN ORDER, INSORD: 2 UNITS/HOUR
INSULIN ORDER, INSORD: 2.2 UNITS/HOUR
INSULIN ORDER, INSORD: 2.6 UNITS/HOUR
INSULIN ORDER, INSORD: 2.9 UNITS/HOUR
LOW TARGET, LOT: 95 MG/DL
MAGNESIUM SERPL-MCNC: 2.4 MG/DL (ref 1.6–2.4)
MAGNESIUM SERPL-MCNC: 2.5 MG/DL (ref 1.6–2.4)
MCH RBC QN AUTO: 31.3 PG (ref 26–34)
MCHC RBC AUTO-ENTMCNC: 33.4 G/DL (ref 30–36.5)
MCV RBC AUTO: 93.7 FL (ref 80–99)
MINUTES UNTIL NEXT BG, NBG: 60 MIN
MULTIPLIER, MUL: 0.02
MULTIPLIER, MUL: 0.03
MULTIPLIER, MUL: 0.04
NRBC # BLD: 0 K/UL (ref 0–0.01)
NRBC BLD-RTO: 0 PER 100 WBC
ORDER INITIALS, ORDINIT: NORMAL
PLATELET # BLD AUTO: 89 K/UL (ref 150–400)
PMV BLD AUTO: 10.8 FL (ref 8.9–12.9)
POTASSIUM SERPL-SCNC: 4.1 MMOL/L (ref 3.5–5.1)
POTASSIUM SERPL-SCNC: 4.1 MMOL/L (ref 3.5–5.1)
PROT SERPL-MCNC: 5.6 G/DL (ref 6.4–8.2)
RBC # BLD AUTO: 3.19 M/UL (ref 4.1–5.7)
SERVICE CMNT-IMP: ABNORMAL
SERVICE CMNT-IMP: NORMAL
SODIUM SERPL-SCNC: 139 MMOL/L (ref 136–145)
SODIUM SERPL-SCNC: 139 MMOL/L (ref 136–145)
WBC # BLD AUTO: 12.3 K/UL (ref 4.1–11.1)

## 2020-07-25 PROCEDURE — 83735 ASSAY OF MAGNESIUM: CPT

## 2020-07-25 PROCEDURE — 74011636637 HC RX REV CODE- 636/637: Performed by: THORACIC SURGERY (CARDIOTHORACIC VASCULAR SURGERY)

## 2020-07-25 PROCEDURE — 82962 GLUCOSE BLOOD TEST: CPT

## 2020-07-25 PROCEDURE — 85027 COMPLETE CBC AUTOMATED: CPT

## 2020-07-25 PROCEDURE — 36415 COLL VENOUS BLD VENIPUNCTURE: CPT

## 2020-07-25 PROCEDURE — 74011000250 HC RX REV CODE- 250: Performed by: THORACIC SURGERY (CARDIOTHORACIC VASCULAR SURGERY)

## 2020-07-25 PROCEDURE — 74011250637 HC RX REV CODE- 250/637: Performed by: NURSE PRACTITIONER

## 2020-07-25 PROCEDURE — 74011250636 HC RX REV CODE- 250/636: Performed by: ANESTHESIOLOGY

## 2020-07-25 PROCEDURE — 93005 ELECTROCARDIOGRAM TRACING: CPT

## 2020-07-25 PROCEDURE — 97116 GAIT TRAINING THERAPY: CPT | Performed by: PHYSICAL THERAPIST

## 2020-07-25 PROCEDURE — 77030012390 HC DRN CHST BTL GTNG -B

## 2020-07-25 PROCEDURE — 74011636637 HC RX REV CODE- 636/637: Performed by: NURSE PRACTITIONER

## 2020-07-25 PROCEDURE — 65620000000 HC RM CCU GENERAL

## 2020-07-25 PROCEDURE — 97530 THERAPEUTIC ACTIVITIES: CPT | Performed by: PHYSICAL THERAPIST

## 2020-07-25 PROCEDURE — 80053 COMPREHEN METABOLIC PANEL: CPT

## 2020-07-25 PROCEDURE — 74011250637 HC RX REV CODE- 250/637: Performed by: THORACIC SURGERY (CARDIOTHORACIC VASCULAR SURGERY)

## 2020-07-25 PROCEDURE — 74011000258 HC RX REV CODE- 258: Performed by: NURSE PRACTITIONER

## 2020-07-25 PROCEDURE — 74011250636 HC RX REV CODE- 250/636: Performed by: THORACIC SURGERY (CARDIOTHORACIC VASCULAR SURGERY)

## 2020-07-25 PROCEDURE — 71045 X-RAY EXAM CHEST 1 VIEW: CPT

## 2020-07-25 RX ORDER — INSULIN GLARGINE 100 [IU]/ML
10 INJECTION, SOLUTION SUBCUTANEOUS ONCE
Status: COMPLETED | OUTPATIENT
Start: 2020-07-25 | End: 2020-07-25

## 2020-07-25 RX ORDER — AMIODARONE HYDROCHLORIDE 200 MG/1
200 TABLET ORAL 3 TIMES DAILY
Status: DISCONTINUED | OUTPATIENT
Start: 2020-07-25 | End: 2020-07-29 | Stop reason: HOSPADM

## 2020-07-25 RX ORDER — DILTIAZEM HYDROCHLORIDE 5 MG/ML
10 INJECTION INTRAVENOUS ONCE
Status: COMPLETED | OUTPATIENT
Start: 2020-07-25 | End: 2020-07-25

## 2020-07-25 RX ORDER — ACETAMINOPHEN 325 MG/1
650 TABLET ORAL
Status: DISCONTINUED | OUTPATIENT
Start: 2020-07-25 | End: 2020-07-29 | Stop reason: HOSPADM

## 2020-07-25 RX ORDER — DILTIAZEM HYDROCHLORIDE 5 MG/ML
INJECTION INTRAVENOUS
Status: DISPENSED
Start: 2020-07-25 | End: 2020-07-25

## 2020-07-25 RX ORDER — DILTIAZEM HYDROCHLORIDE 5 MG/ML
15 INJECTION INTRAVENOUS ONCE
Status: COMPLETED | OUTPATIENT
Start: 2020-07-25 | End: 2020-07-25

## 2020-07-25 RX ADMIN — DOCUSATE SODIUM - SENNOSIDES 1 TABLET: 50; 8.6 TABLET, FILM COATED ORAL at 08:07

## 2020-07-25 RX ADMIN — Medication 10 ML: at 20:34

## 2020-07-25 RX ADMIN — SODIUM CHLORIDE 1.3 UNITS/HR: 9 INJECTION, SOLUTION INTRAVENOUS at 02:10

## 2020-07-25 RX ADMIN — ACETAMINOPHEN 650 MG: 325 TABLET ORAL at 20:32

## 2020-07-25 RX ADMIN — ASPIRIN 81 MG CHEWABLE TABLET 81 MG: 81 TABLET CHEWABLE at 08:07

## 2020-07-25 RX ADMIN — FAMOTIDINE 20 MG: 20 TABLET, FILM COATED ORAL at 20:32

## 2020-07-25 RX ADMIN — MAGNESIUM OXIDE 400 MG (241.3 MG MAGNESIUM) TABLET 400 MG: TABLET at 08:07

## 2020-07-25 RX ADMIN — MUPIROCIN: 20 OINTMENT TOPICAL at 08:08

## 2020-07-25 RX ADMIN — DILTIAZEM HYDROCHLORIDE 15 MG: 5 INJECTION INTRAVENOUS at 16:49

## 2020-07-25 RX ADMIN — INSULIN LISPRO 2 UNITS: 100 INJECTION, SOLUTION INTRAVENOUS; SUBCUTANEOUS at 09:01

## 2020-07-25 RX ADMIN — POLYETHYLENE GLYCOL 3350 17 G: 17 POWDER, FOR SOLUTION ORAL at 08:07

## 2020-07-25 RX ADMIN — AMIODARONE HYDROCHLORIDE 200 MG: 200 TABLET ORAL at 15:02

## 2020-07-25 RX ADMIN — Medication 10 ML: at 13:57

## 2020-07-25 RX ADMIN — AMIODARONE HYDROCHLORIDE 200 MG: 200 TABLET ORAL at 09:12

## 2020-07-25 RX ADMIN — Medication 10 ML: at 06:43

## 2020-07-25 RX ADMIN — Medication 10 ML: at 06:42

## 2020-07-25 RX ADMIN — OXYCODONE 5 MG: 5 TABLET ORAL at 20:32

## 2020-07-25 RX ADMIN — OXYCODONE 5 MG: 5 TABLET ORAL at 14:11

## 2020-07-25 RX ADMIN — MUPIROCIN: 20 OINTMENT TOPICAL at 20:32

## 2020-07-25 RX ADMIN — CHLORHEXIDINE GLUCONATE 10 ML: 1.2 RINSE ORAL at 08:07

## 2020-07-25 RX ADMIN — FAMOTIDINE 20 MG: 20 TABLET, FILM COATED ORAL at 08:07

## 2020-07-25 RX ADMIN — SODIUM CHLORIDE 50 ML/HR: 900 INJECTION, SOLUTION INTRAVENOUS at 04:15

## 2020-07-25 RX ADMIN — INSULIN GLARGINE 10 UNITS: 100 INJECTION, SOLUTION SUBCUTANEOUS at 12:19

## 2020-07-25 RX ADMIN — DEXTROSE MONOHYDRATE 10 MG/HR: 50 INJECTION, SOLUTION INTRAVENOUS at 16:56

## 2020-07-25 RX ADMIN — CHLORHEXIDINE GLUCONATE 10 ML: 1.2 RINSE ORAL at 20:33

## 2020-07-25 RX ADMIN — OXYCODONE 5 MG: 5 TABLET ORAL at 08:07

## 2020-07-25 RX ADMIN — AMIODARONE HYDROCHLORIDE 200 MG: 200 TABLET ORAL at 20:32

## 2020-07-25 RX ADMIN — DILTIAZEM HYDROCHLORIDE 10 MG: 5 INJECTION INTRAVENOUS at 08:56

## 2020-07-25 RX ADMIN — MAGNESIUM OXIDE 400 MG (241.3 MG MAGNESIUM) TABLET 400 MG: TABLET at 17:02

## 2020-07-25 NOTE — PROGRESS NOTES
Problem: Mobility Impaired (Adult and Pediatric)  Goal: *Acute Goals and Plan of Care (Insert Text)  Description: FUNCTIONAL STATUS PRIOR TO ADMISSION: Patient was independent and active without use of DME.     HOME SUPPORT PRIOR TO ADMISSION: The patient lived with family. Physical Therapy Goals  Initiated 7/24/2020  1. Patient will move from supine to sit and sit to supine  in bed with independence within 5 days. 2.  Patient will perform sit to/from stand with independence within 5 days. 3.  Patient will ambulate 400 feet with least restrictive assistive device and independence within 5 days. 4.  Patient will ascend/descend 12 stairs with 1 handrail(s) with modified independence within 5 days. 5.  Patient will perform cardiac exercises per protocol with independence within 5 days. Outcome: Progressing Towards Goal  Note:   PHYSICAL THERAPY TREATMENT  Patient: Courtney Hutton (89 y.o. male)  Date: 7/25/2020  Diagnosis: Mitral regurgitation [I34.0]   <principal problem not specified>  Procedure(s) (LRB):  JEANIE BY DR Pat Bowling - MITRAL VALVE REPAIR  WITH RIGHT  THORACOTOMY - RIGHT GROIN CANNULATION (Right) 2 Days Post-Op  Precautions: Other (comment)(Monitor BP d/t hypotension)  Chart, physical therapy assessment, plan of care and goals were reviewed. ASSESSMENT  Patient continues with skilled PT services and is progressing towards goals. Patient progressing with mobility as vital signs will allow. Bed mobility with CGA. Good sitting balance EOB. HR increases briefly to 120's. Sit to stand with CGA/  Patient able to take a few steps from bed to chair. After brief rest patient able to perform cardiac exercises with supervision. Patient then ambulated 12' in room to toilet. RN present.     Current Level of Function Impacting Discharge (mobility/balance): CGA    Other factors to consider for discharge: vital sign stability         PLAN :  Patient continues to benefit from skilled intervention to address the above impairments. Continue treatment per established plan of care. to address goals. Recommendation for discharge: (in order for the patient to meet his/her long term goals)  Physical therapy at least 2 days/week in the home     This discharge recommendation:  Has not yet been discussed the attending provider and/or case management    IF patient discharges home will need the following DME: to be determined (TBD)       SUBJECTIVE:   Patient stated I can get up.     OBJECTIVE DATA SUMMARY:   Patient mobilized on continuous portable monitor/telemetry. Critical Behavior:  Neurologic State: Alert, Appropriate for age  Orientation Level: Oriented X4  Cognition: Appropriate for age attention/concentration, Follows commands       Functional Mobility Training:  Bed Mobility:  Rolling: Contact guard assistance  Supine to Sit: Contact guard assistance     Scooting: Supervision        Transfers:  Sit to Stand: Contact guard assistance  Stand to Sit: Contact guard assistance                             Balance:  Sitting: Intact  Standing: Impaired  Standing - Static: Good  Standing - Dynamic : Fair  Ambulation/Gait Training:  Distance (ft): 10 Feet (ft)  Assistive Device: Gait belt(HHA)  Ambulation - Level of Assistance: Contact guard assistance        Gait Abnormalities: Decreased step clearance        Base of Support: Widened     Speed/Acacia: Pace decreased (<100 feet/min)  Step Length: Left shortened;Right shortened                                             Therapeutic Exercises:   Patient instructed on the benefits and demonstrated cardiac exercises while. Instructed and indicated understanding on how to progress reps, sets, against gravity, working up through weights and so on based on cardiologist clearance in prep for functional activity. Can use household items for weights.      CARDIAC  EXERCISE   Sets   Reps   Active Active Assist   Passive Self ROM   Comments   Shoulder flexion 1 5 [x]                                            []                                            []                                            []                                               Shoulder abduction 1  5 [x]                                            []                                            []                                            []                                               Scapular retraction 1 5 [x]                                            []                                            []                                            []                                                   Pain Rating:  Discomfort reported    Activity Tolerance:   Fair  Please refer to the flowsheet for vital signs taken during this treatment. After treatment patient left in no apparent distress:   Sitting in chair and Caregiver / family present (sitting on toilet)    COMMUNICATION/COLLABORATION:   The patients plan of care was discussed with: Registered nurse.      Simba Pandey, PT   Time Calculation: 23 mins

## 2020-07-25 NOTE — PROGRESS NOTES
Bedside and Verbal shift change report given to Hu Gonzalez (oncoming nurse) by Matilde Beltran (offgoing nurse). Report included the following information SBAR.     0730 - Started 2nd peripheral IV in the L hand, RN to remove R IJ and smith this AM. Pt complaining of soreness at surgical site, will administer PRN sabrina for pain. 0840 - Smith removed. Pt to void by 1440.    0920 - Pt went into a fib with RVR and became diaphoretic, obtained EKG. Notified Dr. Migue Johns and administered IV cardizem and PO amiodarone per MD orders. 1100 - Pt had a BM and unmeasurable urine occurrence. Educated patient on using urinal to keep measurement of output. 1200 - Calculated and administered lantus dose per orders for weaning insulin gtt.    1300 - RN to draw PRN mag and BMP, pt having PAC's and had 5 beats of v tach. 1415 - Stopped insulin gtt. 1515 - Central line removed. Gauze and tegaderm dressing applied to site. 1640 - Notified Dr. Migue Johns pt converted briefly around 1300 to NSR but returned to A fib and HR is still going up to 120-130's despite amiodarone, given orders for 15mg cardizem once and to start a continuous gtt and 10mg/hr. Stop gtt for HR < 90.    1830 Shift Summary - Cardizem gtt re-started for 's, a fib now controlled. Total chest tube output 310 mL, total urine output 510 mL, 1 BM. Tolerating diet well. Pain controlled with 5mg roxicodone administered twice this shift and Q-ball still infusing. Electrolytes checked, no need for replenishment per protocol. Bedside and Verbal shift change report given to Kennedy Hall (oncoming nurse) by Christiano Troy (offgoing nurse). Report included the following information SBAR.

## 2020-07-25 NOTE — PROGRESS NOTES
7/25/2020    INTENSIVIST PROGRESS NOTE:     Patient seen and evaluated, chart reviewed   47 yo male hx of severe MR  S/P MV repair 7/23  Extubated post op 7/23  Developed afib    No acute complaints    ROS: no sob, no CP    Visit Vitals  /57   Pulse (!) 101   Temp 99.4 °F (37.4 °C)   Resp 15   Ht 6' (1.829 m)   Wt 102.3 kg (225 lb 8.5 oz)   SpO2 96%   BMI 30.59 kg/m²       General: no distress  Eyes: anicteric  HEENT: clear  Neck: FROM  CV: IRRR  Lungs: clear  Abd: soft  : no flank pain  Ext:generalized edema  Skin: no rash  Musculoskeletal: normal inspection  Neuro: non focal alert no motor deficits    CXR: right mid lung density peripheral 7.24 reviewed    Labs reviewed    A/P:  MV repair  afib post op   Severe MR PTA      Pulmonary toilet   IS  Mobilize  Follow up cxr  afib rate control and vconversion per [facility]  DVT PPX  ICU protocols  - Pavel Barrientos MD

## 2020-07-25 NOTE — PROGRESS NOTES
Problem: Falls - Risk of  Goal: *Absence of Falls  Description: Document Sadi Kathleen Fall Risk and appropriate interventions in the flowsheet.   Outcome: Progressing Towards Goal  Note: Fall Risk Interventions:  Mobility Interventions: Assess mobility with egress test, Bed/chair exit alarm, Communicate number of staff needed for ambulation/transfer, OT consult for ADLs, Patient to call before getting OOB, PT Consult for mobility concerns         Medication Interventions: Assess postural VS orthostatic hypotension, Evaluate medications/consider consulting pharmacy, Patient to call before getting OOB, Teach patient to arise slowly    Elimination Interventions: Bed/chair exit alarm, Patient to call for help with toileting needs, Stay With Me (per policy), Toilet paper/wipes in reach, Toileting schedule/hourly rounds              Problem: Patient Education: Go to Patient Education Activity  Goal: Patient/Family Education  Outcome: Progressing Towards Goal     Problem: Cardiac Valve Surgery: Post-Op Day 2  Goal: Activity/Safety  Outcome: Progressing Towards Goal  Goal: Nutrition/Diet  Outcome: Progressing Towards Goal  Goal: Medications  Outcome: Progressing Towards Goal  Goal: Treatments/Interventions/Procedures  Outcome: Progressing Towards Goal  Goal: Psychosocial  Outcome: Progressing Towards Goal  Goal: *Hemodynamically stable without vasoactive medications  Outcome: Progressing Towards Goal  Goal: *Lungs clear or at baseline  Outcome: Progressing Towards Goal  Goal: *Optimal pain control at patient's stated goal  Outcome: Progressing Towards Goal  Goal: *Tolerating diet  Outcome: Progressing Towards Goal  Goal: *Incisions without signs and symptoms of wound complication  Outcome: Progressing Towards Goal

## 2020-07-26 ENCOUNTER — APPOINTMENT (OUTPATIENT)
Dept: GENERAL RADIOLOGY | Age: 50
DRG: 220 | End: 2020-07-26
Attending: NURSE PRACTITIONER
Payer: COMMERCIAL

## 2020-07-26 LAB
ALBUMIN SERPL-MCNC: 3.1 G/DL (ref 3.5–5)
ALBUMIN/GLOB SERPL: 1.1 {RATIO} (ref 1.1–2.2)
ALP SERPL-CCNC: 49 U/L (ref 45–117)
ALT SERPL-CCNC: 33 U/L (ref 12–78)
ANION GAP SERPL CALC-SCNC: 5 MMOL/L (ref 5–15)
AST SERPL-CCNC: 97 U/L (ref 15–37)
ATRIAL RATE: 107 BPM
BILIRUB SERPL-MCNC: 1.7 MG/DL (ref 0.2–1)
BUN SERPL-MCNC: 11 MG/DL (ref 6–20)
BUN/CREAT SERPL: 11 (ref 12–20)
CALCIUM SERPL-MCNC: 8.2 MG/DL (ref 8.5–10.1)
CALCULATED R AXIS, ECG10: 1 DEGREES
CALCULATED T AXIS, ECG11: -12 DEGREES
CHLORIDE SERPL-SCNC: 104 MMOL/L (ref 97–108)
CO2 SERPL-SCNC: 29 MMOL/L (ref 21–32)
CREAT SERPL-MCNC: 0.96 MG/DL (ref 0.7–1.3)
DIAGNOSIS, 93000: NORMAL
ERYTHROCYTE [DISTWIDTH] IN BLOOD BY AUTOMATED COUNT: 12.9 % (ref 11.5–14.5)
GLOBULIN SER CALC-MCNC: 2.7 G/DL (ref 2–4)
GLUCOSE BLD STRIP.AUTO-MCNC: 104 MG/DL (ref 65–100)
GLUCOSE BLD STRIP.AUTO-MCNC: 104 MG/DL (ref 65–100)
GLUCOSE BLD STRIP.AUTO-MCNC: 112 MG/DL (ref 65–100)
GLUCOSE BLD STRIP.AUTO-MCNC: 124 MG/DL (ref 65–100)
GLUCOSE SERPL-MCNC: 105 MG/DL (ref 65–100)
HCT VFR BLD AUTO: 33.5 % (ref 36.6–50.3)
HGB BLD-MCNC: 11.3 G/DL (ref 12.1–17)
MAGNESIUM SERPL-MCNC: 2.2 MG/DL (ref 1.6–2.4)
MCH RBC QN AUTO: 31 PG (ref 26–34)
MCHC RBC AUTO-ENTMCNC: 33.7 G/DL (ref 30–36.5)
MCV RBC AUTO: 92 FL (ref 80–99)
NRBC # BLD: 0 K/UL (ref 0–0.01)
NRBC BLD-RTO: 0 PER 100 WBC
PLATELET # BLD AUTO: 100 K/UL (ref 150–400)
PMV BLD AUTO: 10.7 FL (ref 8.9–12.9)
POTASSIUM SERPL-SCNC: 4.1 MMOL/L (ref 3.5–5.1)
PROT SERPL-MCNC: 5.8 G/DL (ref 6.4–8.2)
Q-T INTERVAL, ECG07: 336 MS
QRS DURATION, ECG06: 146 MS
QTC CALCULATION (BEZET), ECG08: 458 MS
RBC # BLD AUTO: 3.64 M/UL (ref 4.1–5.7)
SERVICE CMNT-IMP: ABNORMAL
SODIUM SERPL-SCNC: 138 MMOL/L (ref 136–145)
VENTRICULAR RATE, ECG03: 112 BPM
WBC # BLD AUTO: 12.6 K/UL (ref 4.1–11.1)

## 2020-07-26 PROCEDURE — 97530 THERAPEUTIC ACTIVITIES: CPT | Performed by: PHYSICAL THERAPIST

## 2020-07-26 PROCEDURE — 82962 GLUCOSE BLOOD TEST: CPT

## 2020-07-26 PROCEDURE — 65620000000 HC RM CCU GENERAL

## 2020-07-26 PROCEDURE — 36415 COLL VENOUS BLD VENIPUNCTURE: CPT

## 2020-07-26 PROCEDURE — 71045 X-RAY EXAM CHEST 1 VIEW: CPT

## 2020-07-26 PROCEDURE — 97116 GAIT TRAINING THERAPY: CPT | Performed by: PHYSICAL THERAPIST

## 2020-07-26 PROCEDURE — 74011250637 HC RX REV CODE- 250/637: Performed by: THORACIC SURGERY (CARDIOTHORACIC VASCULAR SURGERY)

## 2020-07-26 PROCEDURE — 85027 COMPLETE CBC AUTOMATED: CPT

## 2020-07-26 PROCEDURE — 80053 COMPREHEN METABOLIC PANEL: CPT

## 2020-07-26 PROCEDURE — 83735 ASSAY OF MAGNESIUM: CPT

## 2020-07-26 PROCEDURE — 74011250637 HC RX REV CODE- 250/637: Performed by: NURSE PRACTITIONER

## 2020-07-26 PROCEDURE — 74011000250 HC RX REV CODE- 250: Performed by: THORACIC SURGERY (CARDIOTHORACIC VASCULAR SURGERY)

## 2020-07-26 RX ADMIN — OXYCODONE 5 MG: 5 TABLET ORAL at 18:36

## 2020-07-26 RX ADMIN — MUPIROCIN: 20 OINTMENT TOPICAL at 08:19

## 2020-07-26 RX ADMIN — Medication 10 ML: at 05:51

## 2020-07-26 RX ADMIN — MAGNESIUM OXIDE 400 MG (241.3 MG MAGNESIUM) TABLET 400 MG: TABLET at 17:04

## 2020-07-26 RX ADMIN — ACETAMINOPHEN 650 MG: 325 TABLET ORAL at 18:36

## 2020-07-26 RX ADMIN — Medication 10 ML: at 22:18

## 2020-07-26 RX ADMIN — AMIODARONE HYDROCHLORIDE 200 MG: 200 TABLET ORAL at 22:17

## 2020-07-26 RX ADMIN — MUPIROCIN: 20 OINTMENT TOPICAL at 21:12

## 2020-07-26 RX ADMIN — FAMOTIDINE 20 MG: 20 TABLET, FILM COATED ORAL at 21:13

## 2020-07-26 RX ADMIN — CHLORHEXIDINE GLUCONATE 10 ML: 1.2 RINSE ORAL at 21:13

## 2020-07-26 RX ADMIN — ASPIRIN 81 MG CHEWABLE TABLET 81 MG: 81 TABLET CHEWABLE at 08:18

## 2020-07-26 RX ADMIN — Medication 10 ML: at 14:35

## 2020-07-26 RX ADMIN — MAGNESIUM OXIDE 400 MG (241.3 MG MAGNESIUM) TABLET 400 MG: TABLET at 08:18

## 2020-07-26 RX ADMIN — ACETAMINOPHEN 650 MG: 325 TABLET ORAL at 09:42

## 2020-07-26 RX ADMIN — AMIODARONE HYDROCHLORIDE 200 MG: 200 TABLET ORAL at 08:18

## 2020-07-26 RX ADMIN — AMIODARONE HYDROCHLORIDE 200 MG: 200 TABLET ORAL at 17:02

## 2020-07-26 RX ADMIN — CHLORHEXIDINE GLUCONATE 10 ML: 1.2 RINSE ORAL at 08:19

## 2020-07-26 RX ADMIN — FAMOTIDINE 20 MG: 20 TABLET, FILM COATED ORAL at 08:18

## 2020-07-26 RX ADMIN — OXYCODONE 5 MG: 5 TABLET ORAL at 09:42

## 2020-07-26 NOTE — PROGRESS NOTES
3507 Converted to Sinus Sangita Montoya with 1st degree AVB. 0645 Uneventful night. Remains in Sinus Rhythm with 1st degree AVB. Pacer is on for backup, rate set at 45. Denies pain.

## 2020-07-26 NOTE — PROGRESS NOTES
0700: bedside and verbal report received from Ky Whitley, [address]: Dr. Isac Valle at bedside and updated. 3314: assessment completed, pt a&ox4, denies pain, I/S 2000. Ambulated ~ 250ft in hallways  0945: BM and voided. Bathed. Medicated with elias and apap for pain 5.   1030: Ambulated in hallway ~ 400ft with PT.   1200: reassessment, no changes. I/S 2250.   1430: pt ambulated ~ 1000ft in hallway. 1600: reassessment, no changes  1830: pt ambulated ~750ft in hallway. Medicated for pain with elias and apap for pain 5.  1900: bedside and verbal report given to Ky Whitley, RN    Shift Summary: ambulated in hallways x 4, total CT output 355mL serosanguinous, medicated for pain x 2.

## 2020-07-26 NOTE — PROGRESS NOTES
7/26/2020    INTENSIVIST PROGRESS NOTE:     Sinus rhythm   On po amio  High chest tube output   Denies complaints    Visit Vitals  /58   Pulse 73   Temp 99.1 °F (37.3 °C)   Resp 17   Ht 6' (1.829 m)   Wt 102.3 kg (225 lb 8.5 oz)   SpO2 100%   BMI 30.59 kg/m²       General: no distress  Eyes: anicteric  HEENT: clear  Neck: FROM  CV: IRRR  Lungs: clear  Abd: soft  : no flank pain  Ext:generalized edema  Skin: no rash  Musculoskeletal: normal inspection  Neuro: non focal alert no motor deficits    CXR: right mid lung density peripheral 7.24 reviewed    Labs reviewed    A/P:  MV repair  afib post op   Severe MR PTA      Pulmonary toilet   IS  Mobilize  Follow up cxr  afib rate control and conversion per [facility]  DVT PPX  ICU protocols  - Erica Del Rio MD

## 2020-07-26 NOTE — PROGRESS NOTES
Problem: Mobility Impaired (Adult and Pediatric)  Goal: *Acute Goals and Plan of Care (Insert Text)  Description: FUNCTIONAL STATUS PRIOR TO ADMISSION: Patient was independent and active without use of DME.     HOME SUPPORT PRIOR TO ADMISSION: The patient lived with family. Physical Therapy Goals  Initiated 7/24/2020  1. Patient will move from supine to sit and sit to supine  in bed with independence within 5 days. 2.  Patient will perform sit to/from stand with independence within 5 days. 3.  Patient will ambulate 400 feet with least restrictive assistive device and independence within 5 days. 4.  Patient will ascend/descend 12 stairs with 1 handrail(s) with modified independence within 5 days. 5.  Patient will perform cardiac exercises per protocol with independence within 5 days. Outcome: Progressing Towards Goal   PHYSICAL THERAPY TREATMENT  Patient: Tracy De Anda (24 y.o. male)  Date: 7/26/2020  Diagnosis: Mitral regurgitation [I34.0]   <principal problem not specified>  Procedure(s) (LRB):  JEANIE BY DR Alvarez Knee - MITRAL VALVE REPAIR  WITH RIGHT  THORACOTOMY - RIGHT GROIN CANNULATION (Right) 3 Days Post-Op  Precautions: Other (comment)(Monitor BP d/t hypotension)  Chart, physical therapy assessment, plan of care and goals were reviewed. ASSESSMENT  Patient continues with skilled PT services and is progressing towards goals. Patient demonstrating significant improvements in endurance evidenced by increased walking distance. Patient was able to ambulate 400 feet in halls today using cardiac RW for support. Gait is slow but steady with no overt LOB noted. Patient requiring CGA for transfers and ambulation, and occasionally requires VC for adherence to sternal precautions with transfers.  VSS throughout tx session with no c/o dizziness    Patient is verbalizing understanding of mindful-based movements (\"move in the tube\") principles of keeping UEs proximal to ribcage to prevent lateral pull on the sternum during load-bearing activities with verbal cues required for compliance. Current Level of Function Impacting Discharge (mobility/balance): CGA           PLAN :  Patient continues to benefit from skilled intervention to address the above impairments. Continue treatment per established plan of care. to address goals. Recommendation for discharge: (in order for the patient to meet his/her long term goals)  To be determined: OP cardiac rehab once cleared by MD    This discharge recommendation:  Has not yet been discussed the attending provider and/or case management    IF patient discharges home will need the following DME: none       SUBJECTIVE:   Patient stated My side is sore.     OBJECTIVE DATA SUMMARY:   Patient mobilized on continuous portable monitor/telemetry. Critical Behavior:  Neurologic State: Alert  Orientation Level: Oriented X4  Cognition: Appropriate decision making, Appropriate for age attention/concentration, Appropriate safety awareness, Follows commands       Functional Mobility Training:  Bed Mobility:      Deferred; patient sitting in chair upon arrival                Transfers:  Sit to Stand: Contact guard assistance  Stand to Sit: Contact guard assistance                               Balance:  Sitting: Intact  Standing: Impaired  Standing - Static: Good  Standing - Dynamic : Good    Ambulation/Gait Training:  Distance (ft): 400 Feet (ft)  Assistive Device: Gait belt(cardiac walker)  Ambulation - Level of Assistance: Contact guard assistance        Gait Abnormalities: Decreased step clearance(guarded)        Base of Support: Widened     Speed/Dennys: Pace decreased (<100 feet/min); Slow  Step Length: Left shortened;Right shortened(increasing with distance walked)      Gait is slow but steady; improving stride length and dennys with distance walked            Cardiac diagnosis intervention:  Patient instructed and educated on mindful movement principles based on Move in The Tube concept to include maintaining bilateral elbows close to rib cage when performing any load-bearing activity such as getting in/out of bed, pushing up from a chair, opening a door, or lifting a box. Patient was given a handout with diagrams of each correct/incorrect method of performing each of the above tasks. Therapeutic Exercises:   Patient instructed on the benefits and demonstrated cardiac exercises while sitting with Supervision. Instructed and indicated understanding on how to progress reps, sets against gravity, pacing through progressive muscle strengthening standing based on surgeon clearance for more weight in prep for basic and instrumental ADLs. Instruction on the use of household items in place of weights as needed.      CARDIAC  EXERCISE   Sets   Reps   Active Active Assist   Passive Self ROM   Comments   Shoulder flexion 1 10 [x]                                            []                                            []                                            []                                               Shoulder abduction 1      10 [x]                                            []                                            []                                            []                                                  []                                            []                                            []                                            []                                                  []                                            []                                            []                                            []                                               Trunk rotation 1 10 [x]                                            []                                            []                                            []                                                   []                                            [] []                                                  []                                            []                                            []                                            []                                                   Activity Tolerance:   Good  Please refer to the flowsheet for vital signs taken during this treatment. After treatment patient left in no apparent distress:   Sitting in chair and Call bell within reach    COMMUNICATION/COLLABORATION:   The patients plan of care was discussed with: Occupational therapist and Registered nurse.      Joe Cos, PT   Time Calculation: 25 mins

## 2020-07-26 NOTE — PROGRESS NOTES
Converted to NSR on amio  No complaints  Still with 190 CT drainage last 8 hours  Will ambulate   Hopefully home in am

## 2020-07-27 ENCOUNTER — APPOINTMENT (OUTPATIENT)
Dept: GENERAL RADIOLOGY | Age: 50
DRG: 220 | End: 2020-07-27
Attending: NURSE PRACTITIONER
Payer: COMMERCIAL

## 2020-07-27 LAB
ABO + RH BLD: NORMAL
ANION GAP SERPL CALC-SCNC: 4 MMOL/L (ref 5–15)
BLD PROD TYP BPU: NORMAL
BLD PROD TYP BPU: NORMAL
BLOOD GROUP ANTIBODIES SERPL: NORMAL
BPU ID: NORMAL
BPU ID: NORMAL
BUN SERPL-MCNC: 13 MG/DL (ref 6–20)
BUN/CREAT SERPL: 12 (ref 12–20)
CALCIUM SERPL-MCNC: 8.5 MG/DL (ref 8.5–10.1)
CHLORIDE SERPL-SCNC: 103 MMOL/L (ref 97–108)
CO2 SERPL-SCNC: 29 MMOL/L (ref 21–32)
CREAT SERPL-MCNC: 1.06 MG/DL (ref 0.7–1.3)
CROSSMATCH RESULT,%XM: NORMAL
CROSSMATCH RESULT,%XM: NORMAL
ERYTHROCYTE [DISTWIDTH] IN BLOOD BY AUTOMATED COUNT: 12.8 % (ref 11.5–14.5)
GLUCOSE BLD STRIP.AUTO-MCNC: 109 MG/DL (ref 65–100)
GLUCOSE SERPL-MCNC: 101 MG/DL (ref 65–100)
HCT VFR BLD AUTO: 34.8 % (ref 36.6–50.3)
HGB BLD-MCNC: 12 G/DL (ref 12.1–17)
MCH RBC QN AUTO: 31.2 PG (ref 26–34)
MCHC RBC AUTO-ENTMCNC: 34.5 G/DL (ref 30–36.5)
MCV RBC AUTO: 90.4 FL (ref 80–99)
NRBC # BLD: 0 K/UL (ref 0–0.01)
NRBC BLD-RTO: 0 PER 100 WBC
PLATELET # BLD AUTO: 146 K/UL (ref 150–400)
PMV BLD AUTO: 10.3 FL (ref 8.9–12.9)
POTASSIUM SERPL-SCNC: 4 MMOL/L (ref 3.5–5.1)
RBC # BLD AUTO: 3.85 M/UL (ref 4.1–5.7)
SERVICE CMNT-IMP: ABNORMAL
SODIUM SERPL-SCNC: 136 MMOL/L (ref 136–145)
SPECIMEN EXP DATE BLD: NORMAL
STATUS OF UNIT,%ST: NORMAL
STATUS OF UNIT,%ST: NORMAL
UNIT DIVISION, %UDIV: 0
UNIT DIVISION, %UDIV: 0
WBC # BLD AUTO: 9.9 K/UL (ref 4.1–11.1)

## 2020-07-27 PROCEDURE — 85027 COMPLETE CBC AUTOMATED: CPT

## 2020-07-27 PROCEDURE — 74011250637 HC RX REV CODE- 250/637: Performed by: NURSE PRACTITIONER

## 2020-07-27 PROCEDURE — 65660000000 HC RM CCU STEPDOWN

## 2020-07-27 PROCEDURE — 74011250637 HC RX REV CODE- 250/637: Performed by: THORACIC SURGERY (CARDIOTHORACIC VASCULAR SURGERY)

## 2020-07-27 PROCEDURE — 82962 GLUCOSE BLOOD TEST: CPT

## 2020-07-27 PROCEDURE — 36415 COLL VENOUS BLD VENIPUNCTURE: CPT

## 2020-07-27 PROCEDURE — 97530 THERAPEUTIC ACTIVITIES: CPT

## 2020-07-27 PROCEDURE — 74011000250 HC RX REV CODE- 250: Performed by: NURSE PRACTITIONER

## 2020-07-27 PROCEDURE — 97116 GAIT TRAINING THERAPY: CPT

## 2020-07-27 PROCEDURE — 80048 BASIC METABOLIC PNL TOTAL CA: CPT

## 2020-07-27 PROCEDURE — 74011000250 HC RX REV CODE- 250: Performed by: THORACIC SURGERY (CARDIOTHORACIC VASCULAR SURGERY)

## 2020-07-27 PROCEDURE — 71045 X-RAY EXAM CHEST 1 VIEW: CPT

## 2020-07-27 RX ORDER — METOPROLOL TARTRATE 25 MG/1
12.5 TABLET, FILM COATED ORAL 2 TIMES DAILY
Status: DISCONTINUED | OUTPATIENT
Start: 2020-07-27 | End: 2020-07-28

## 2020-07-27 RX ORDER — SODIUM CHLORIDE 0.9 % (FLUSH) 0.9 %
5-40 SYRINGE (ML) INJECTION EVERY 8 HOURS
Status: DISCONTINUED | OUTPATIENT
Start: 2020-07-27 | End: 2020-07-29 | Stop reason: HOSPADM

## 2020-07-27 RX ORDER — SODIUM CHLORIDE 0.9 % (FLUSH) 0.9 %
5-40 SYRINGE (ML) INJECTION AS NEEDED
Status: DISCONTINUED | OUTPATIENT
Start: 2020-07-27 | End: 2020-07-29 | Stop reason: HOSPADM

## 2020-07-27 RX ORDER — LANOLIN ALCOHOL/MO/W.PET/CERES
1 CREAM (GRAM) TOPICAL
Status: DISCONTINUED | OUTPATIENT
Start: 2020-07-28 | End: 2020-07-29 | Stop reason: HOSPADM

## 2020-07-27 RX ADMIN — ASPIRIN 81 MG CHEWABLE TABLET 81 MG: 81 TABLET CHEWABLE at 08:02

## 2020-07-27 RX ADMIN — AMIODARONE HYDROCHLORIDE 200 MG: 200 TABLET ORAL at 08:02

## 2020-07-27 RX ADMIN — AMIODARONE HYDROCHLORIDE 200 MG: 200 TABLET ORAL at 15:11

## 2020-07-27 RX ADMIN — CHLORHEXIDINE GLUCONATE 10 ML: 1.2 RINSE ORAL at 22:22

## 2020-07-27 RX ADMIN — MAGNESIUM OXIDE 400 MG (241.3 MG MAGNESIUM) TABLET 400 MG: TABLET at 08:02

## 2020-07-27 RX ADMIN — AMIODARONE HYDROCHLORIDE 200 MG: 200 TABLET ORAL at 22:21

## 2020-07-27 RX ADMIN — FAMOTIDINE 20 MG: 20 TABLET, FILM COATED ORAL at 08:02

## 2020-07-27 RX ADMIN — MUPIROCIN: 20 OINTMENT TOPICAL at 09:00

## 2020-07-27 RX ADMIN — METOPROLOL TARTRATE 12.5 MG: 25 TABLET, FILM COATED ORAL at 17:03

## 2020-07-27 RX ADMIN — Medication 10 ML: at 22:25

## 2020-07-27 RX ADMIN — ACETAMINOPHEN 650 MG: 325 TABLET ORAL at 15:10

## 2020-07-27 RX ADMIN — Medication 10 ML: at 06:16

## 2020-07-27 RX ADMIN — Medication 10 ML: at 14:57

## 2020-07-27 RX ADMIN — ACETAMINOPHEN 650 MG: 325 TABLET ORAL at 07:00

## 2020-07-27 RX ADMIN — MAGNESIUM OXIDE 400 MG (241.3 MG MAGNESIUM) TABLET 400 MG: TABLET at 17:06

## 2020-07-27 RX ADMIN — Medication 20 ML: at 22:23

## 2020-07-27 RX ADMIN — CHLORHEXIDINE GLUCONATE 10 ML: 1.2 RINSE ORAL at 08:03

## 2020-07-27 RX ADMIN — FAMOTIDINE 20 MG: 20 TABLET, FILM COATED ORAL at 22:22

## 2020-07-27 RX ADMIN — OXYCODONE 5 MG: 5 TABLET ORAL at 07:00

## 2020-07-27 NOTE — PROGRESS NOTES
0700 Bedside and Verbal shift change report given to Shalom Perez and Yadi RN (oncoming nurse) by Yanira Pope (offgoing nurse). Report included the following information SBAR, Kardex, ED Summary, OR Summary, Procedure Summary, Intake/Output, MAR, Recent Results, Med Rec Status, Cardiac Rhythm NSR and Procedure Verification. 0730 Morning assessment completed, see flow sheet. Patient resting in chair. Afebrile. No complaints of pain at this time.     0830 assisted patient back to bed for the removal of chest tube and q ball    26 spoke to patients wife Cora Fox), given update on patient    0900 Chest tube and q ball pump removed    0950 assisted patient back up to the chair    1030 Walked patient over to PCU room 2262, bedside report given to Zackary Nieves

## 2020-07-27 NOTE — ROUTINE PROCESS
1955 Dr. Andrés Parikh called due to elevated temperature. No orders received. 0630 Uneventful night. Denies pain. Slept most of the night. Remained in NSR. 0645 Up to chair at bedside. Steady gait. No dizziness noted. Total chest tube output  55 ml for the past 12 hours. 0700 Complained of right side pain after getting up. Medicated with Roxicodone 5 mg and tylenol 650 mg po. Bedside and Verbal shift change report given to Светлана Paris. Report included the following information SBAR, Kardex, OR Summary, Procedure Summary, Intake/Output, MAR and Cardiac Rhythm NSR.

## 2020-07-27 NOTE — PROGRESS NOTES
PHYSICAL THERAPY TREATMENT/DISCHARGE  Patient: Daphnie Morrison (25 y.o. male)  Date: 7/27/2020  Diagnosis: Mitral regurgitation [I34.0] <principal problem not specified>  Procedure(s) (LRB):  JEANIE BY DR Fabby Zamora - MITRAL VALVE REPAIR  WITH RIGHT  THORACOTOMY - RIGHT GROIN CANNULATION (Right) 4 Days Post-Op  Precautions: Other (comment)(Monitor BP d/t hypotension)  Chart, physical therapy assessment, plan of care and goals were reviewed. ASSESSMENT  Patient continues with skilled PT services and has progressed towards goals. Patient has been up ad veronica, demonstrating safe and steady gait without use of AD. Patient ambulated >500 feet independently. He negotiated stairs with no concerns. Encouraged patient to continue mobilizing every hour and patient is very eager to do so. Other factors to consider for discharge: MVR thoracotomy          PLAN :  Patient will be discharged from acute skilled physical therapy at this time. Rationale for discharge:  Goals achieved    Recommendation for discharge: (in order for the patient to meet his/her long term goals)  Outpatient physical therapy follow up recommended for cardiac rehab    This discharge recommendation:  Has been made in collaboration with the attending provider and/or case management    IF patient discharges home will need the following DME: none       SUBJECTIVE:   Patient stated I can't stand being still. My butt was hurting so bad.     OBJECTIVE DATA SUMMARY:   Critical Behavior:  Neurologic State: Alert  Orientation Level: Oriented X4  Cognition: Appropriate decision making, Appropriate for age attention/concentration, Appropriate safety awareness, Follows commands     Functional Mobility Training:  Bed Mobility:                    Transfers:  Sit to Stand: Independent  Stand to Sit: Independent        Bed to Chair: Independent                    Balance:  Sitting: Intact; Without support  Standing: Intact; Without support  Ambulation/Gait Training:  Distance (ft): 500 Feet (ft)  Assistive Device: Gait belt  Ambulation - Level of Assistance: Independent     Gait Description (WDL): Exceptions to WDL  Gait Abnormalities: Decreased step clearance        Base of Support: Widened        Step Length: Right shortened;Left shortened                    Stairs:  Number of Stairs Trained: 12  Stairs - Level of Assistance: Modified independent;Supervision   Rail Use: None        Activity Tolerance:   Good  Please refer to the flowsheet for vital signs taken during this treatment. After treatment patient left in no apparent distress:   Sitting in chair and Call bell within reach    COMMUNICATION/COLLABORATION:   The patients plan of care was discussed with: Occupational therapist, Registered nurse and Case management.      Ceasar Ritter PT, DPT   Time Calculation: 10 mins

## 2020-07-27 NOTE — PROGRESS NOTES
OCCUPATIONAL THERAPY TREATMENT/DISCHARGE  Patient: Jonathan Yap (61 y.o. male)  Date: 7/27/2020  Diagnosis: Mitral regurgitation [I34.0] <principal problem not specified>  Procedure(s) (LRB):  JEANIE BY DR Francheska Ruff - MITRAL VALVE REPAIR  WITH RIGHT  THORACOTOMY - RIGHT GROIN CANNULATION (Right) 4 Days Post-Op  Precautions: Other (comment)(Monitor BP d/t hypotension)  Chart, occupational therapy assessment, plan of care, and goals were reviewed. ASSESSMENT  Patient continues with skilled OT services and has progressed towards goals. Pt is now performing his dynamic ADL routine at Independent level with no SOB, fatigue or LOB. Pt completed item retrieval activity and standing grooming task independently. Reviewed tips for gradually progressing activity level during ADL/IADL task performance upon D/C, with Pt verbalizing understanding. Pt not in need of further skilled acute OT. Current Level of Function (ADLs/self-care): Independent    Other factors to consider for discharge: None         PLAN :  Rationale for discharge: Goals achieved  Recommend with staff: None  Recommendation for discharge: (in order for the patient to meet his/her long term goals)  No skilled occupational therapy/ follow up rehabilitation needs identified at this time. This discharge recommendation:  Has been made in collaboration with the attending provider and/or case management    IF patient discharges home will need the following DME:none       SUBJECTIVE:   Patient stated \"I'm feeling pretty good\"    OBJECTIVE DATA SUMMARY:   Cognitive/Behavioral Status:  Neurologic State: Alert; Appropriate for age  Orientation Level: Oriented X4  Cognition: Appropriate decision making; Appropriate for age attention/concentration; Appropriate safety awareness             Functional Mobility and Transfers for ADLs:  Bed Mobility:  Supine to Sit: Independent  Sit to Supine: Independent  Scooting: Independent    Transfers:  Sit to Stand: Independent  Functional Transfers  Bathroom Mobility: Independent  Toilet Transfer : Independent  Shower Transfer: Independent  Bed to Chair: Independent    Balance:  Sitting: Intact  Standing: Intact    ADL Intervention:       Grooming  Washing Hands: Independent    Lower Body Dressing Assistance  Socks: Independent    Pain:  None reported    Activity Tolerance:   Good  Please refer to the flowsheet for vital signs taken during this treatment. After treatment patient left in no apparent distress:   Sitting in chair and Call bell within reach    COMMUNICATION/COLLABORATION:   The patients plan of care was discussed with: Registered nurse and Patient.      Yoko Boyd OTR/L  Time Calculation: 9 mins

## 2020-07-27 NOTE — PROGRESS NOTES
[facility] ICU Progress Note    Admit Date: 2020  POD:  4 Day Post-Op    Procedure:  Procedure(s):  JEANIE BY DR PHILLIPS - MITRAL VALVE REPAIR  WITH RIGHT  THORACOTOMY - RIGHT GROIN CANNULATION        Subjective:   Pt seen with Dr. Saba Wasserman. Tmax 101.3., on RA. Up in chair. NSR. Feels well this morning. Objective:   Vitals:  Blood pressure 110/64, pulse 76, temperature 98.6 °F (37 °C), resp. rate 15, height 6' (1.829 m), weight 225 lb 8.5 oz (102.3 kg), SpO2 100 %. Temp (24hrs), Av.6 °F (37.6 °C), Min:98.6 °F (37 °C), Max:101.3 °F (38.5 °C)    EKG/Rhythm:  NSR 70s    Extubation Date / Time: 20 0757     CT Output: 410 ml/24 hrs    Oxygen Therapy:  Oxygen Therapy  O2 Sat (%): 100 % (20 0700)  Pulse via Oximetry: 75 beats per minute (20 0700)  O2 Device: Room air (20 0400)  O2 Flow Rate (L/min): 2 l/min (20 1600)  FIO2 (%): 50 % (20 1917)    CXR:   CXR Results  (Last 48 hours)               20 0421  XR CHEST PORT Final result    Impression:  IMPRESSION:    No significant change. Narrative:  INDICATION:    postop heart        EXAMINATION:  AP CHEST, PORTABLE       COMPARISON: 2020       FINDINGS: Single AP portable view of the chest at 412 hours demonstrates no   change in position of the lines and tubes. The cardiomediastinal silhouette is   stable. There is no new airspace disease. There is an unchanged rounded opacity   overlying the right middle lung zone, presumably loculated pleural fluid. There   is right basilar atelectasis which is mild. There is no pneumothorax.                  Admission Weight: Last Weight   Weight: 225 lb 8.5 oz (102.3 kg) Weight: 225 lb 8.5 oz (102.3 kg)     Intake / Output / Drain:  Current Shift:  0701 -  1900  In: -   Out: 30 [Drains:30]  Last 24 hrs.:     Intake/Output Summary (Last 24 hours) at 2020 0733  Last data filed at 2020 0730  Gross per 24 hour   Intake 1470 ml   Output 4165 ml   Net -2695 ml EXAM:  General:   Alert, NAD                                                                                           Lungs:   Clear upper to auscultation bilaterally. Incision:  dsg cdi   Heart:  Regular rate and rhythm, S1, S2 normal, no murmur, click, rub or gallop. Abdomen:   Soft, non-tender. Bowel sounds active. No masses,  No organomegaly. Extremities:  No edema. PPP. Neurologic:  Gross motor and sensory apparatus intact. Labs:   Recent Labs     20  2216  20  0423   WBC  --   --  12.6*   HGB  --   --  11.3*   HCT  --   --  33.5*   PLT  --   --  100*   NA  --   --  138   K  --   --  4.1   BUN  --   --  11   CREA  --   --  0.96   GLU  --   --  105*   GLUCPOC 112*   < >  --     < > = values in this interval not displayed. Assessment:     Active Problems:    Mitral regurgitation (2020)      S/P MVR (mitral valve repair) (2020)      Overview: MITRAL VALVE REPAIR 38mm CG Future Band Via Mini Right Thoracotomy      RIGHT FEMORAL ARTERIAL AND VENOUS CANNULATION               Plan/Recommendations/Medical Decision Makin. MR s/p MVr mini right thoracotomy: On ASA  2. Atelectasis: Encourage I/S, on RA  3. Post op anemia st acute blood loss: Monitor H&H and CT output  4. Thrombocytopenia: mild, monitor plt  5. Arrhythmia: bradycardic initially post op, had afib with RVR on Saturday. Now NSR. On PO amio. 6. Pain control: remove onq, prn tylenol and oxycodone  7. Elevated Cr: improved  8. Elevated Tbili: Tbili 1.6 preop, back to baseline  9. Leukocytosis: mild, did have temp 101 last night, afebrile this morning. Monitor. 10. Dispo: PT/OT, transfer to stepdown. Plan on dc home tomorrow.      Signed By: Gini Gonzalez NP

## 2020-07-27 NOTE — PROGRESS NOTES
RAPID RESPONSE TEAM - follow up    Rounded on patient due to recent transfer from CCU. Discussed with primary RN, Clement Dugan. No acute concerns, VSS, MEWS 1. Visit Vitals  /74   Pulse 79   Temp 98 °F (36.7 °C)   Resp 18   Ht 6' (1.829 m)   Wt 102.3 kg (225 lb 8.5 oz)   SpO2 100%   BMI 30.59 kg/m²         No RRT interventions indicated at this time. Please call with any questions or concerns.      Lise Shetty

## 2020-07-27 NOTE — PROGRESS NOTES
7/27/2020    INTENSIVIST PROGRESS NOTE:     Sinus rhythm   On po amio  Still chest tube in place   Denies complaints    Visit Vitals  /66   Pulse 74   Temp 98.6 °F (37 °C)   Resp 15   Ht 6' (1.829 m)   Wt 102.3 kg (225 lb 8.5 oz)   SpO2 98%   BMI 30.59 kg/m²       General: no distress  Eyes: anicteric  HEENT: clear  Neck: FROM  CV: RRR  Lungs: clear  Abd: soft  : no flank pain  Ext:generalized edema  Skin: no rash  Musculoskeletal: normal inspection  Neuro: non focal alert no motor deficits    CXR: clear    Labs reviewed    A/P:  MV repair: recovering post op well  afib post op: resolved  Severe MR PTA  Pulmonary toilet   IS  Mobilize  DVT PPX  ICU protocols  Transfer to pcu today    - Michelle Alcocer MD

## 2020-07-27 NOTE — PROGRESS NOTES
PRO:  1) Home with [facility] RN- referral sent to [facility]  2) FU appts  3) Family to transport at d/c    2:39 PM: Case management consults noted. Referral sent to [facility] for [facility] RN. Pt likely to discharge home tomorrow.      Gallo Caldwell, RN ,[facility]  [phone]

## 2020-07-27 NOTE — CARDIO/PULMONARY
Cardiac Rehab Note: chart review Consult has been acknowledged Pt is a 49 yo admitted for MVR, PMHx of MR, CP, dyspnea, GERD. Pt s/p MVR 7/23/20. Smoking history assessed. Patient is a former smoker. Smoking Cessation Program link has not been added to the AVS. EF 55%  on 7/23/20 per echo. Valve  educational folder with \"Cardiac Surgery Post-Op Instructions\" book, heart healthy eating, warning signs, heart facts, heart aware, resources, and  Valve Surgery booklet to Jasmina Stevens was provided to pt during this admission. Book at pts bedside. Pt transferred out of CCU today to stepdown unit. Educated using teach back method. Reviewed the use of bear for sternal support, daily weight and temperature monitoring, showering restrictions, signs and symptoms of infection at surgery sites, daily walking and arm exercises, and use of incentive spirometer. Jasmina Stevens was able to demonstrate proper use of incentive spirometer, achieving 2250ml. Reviewed risk factors for CAD to include the following: family history, elevated BMI, hyperlipidemia, hypertension, diabetes, stress, and smoking. Discussed Heart Healthy/Low Sodium (less than 2000 mg.) diet. Emphasized the value of cardiac rehab. Discussed Cardiac Rehab Program format, benefits, and encouraged enrollment to assist with risk modification and management. Initial Cardiac Rehab Program intake appointment scheduled for 8/13/20 and appointment information is on the AVS. Patient provided orientation packet, instructed to complete packet a couple days prior to appointment, wear gym appropriate clothing and shoes, and bring current list of medications. Patient is to call if unable to keep appointment, need to reschedule or additional questions. Jasmina Stevens verbalized understanding with questions answered. CP Rehab will continue to follow for educational needs.

## 2020-07-27 NOTE — PROGRESS NOTES
Bedside shift change report given to Kristen Mario RN (oncoming nurse) by Lisa Pitts RN (offgoing nurse). Report included the following information SBAR, Kardex, Intake/Output, MAR, Recent Results and Cardiac Rhythm NSR.    1930: Pt A&Ox4 resting in bed, NSR, RA. Denies pain. Lungs clear, active BS. Dsg to R thoracotomy c/d/i; R groin site c/d/i. Using IS. Call bell within reach. 2000: Pt ambulated bee and returns to room. No difficulties. 2300: Pt sleeping. 0530: Pt ambulated bee. C/o mild R shoulder/side discomfort. Will administer PRN Tylenol. Pt up in recliner. 6865: Bedside shift change report given to [address] [place] [address] (oncoming nurse) by Kristen Mario RN (offgoing nurse). Report included the following information SBAR, Kardex, Intake/Output, MAR, Recent Results and Cardiac Rhythm NSR.

## 2020-07-28 ENCOUNTER — APPOINTMENT (OUTPATIENT)
Dept: GENERAL RADIOLOGY | Age: 50
DRG: 220 | End: 2020-07-28
Attending: NURSE PRACTITIONER
Payer: COMMERCIAL

## 2020-07-28 LAB
ANION GAP SERPL CALC-SCNC: 5 MMOL/L (ref 5–15)
APTT PPP: 26.8 SEC (ref 22.1–32)
BUN SERPL-MCNC: 12 MG/DL (ref 6–20)
BUN/CREAT SERPL: 11 (ref 12–20)
CALCIUM SERPL-MCNC: 8.7 MG/DL (ref 8.5–10.1)
CHLORIDE SERPL-SCNC: 103 MMOL/L (ref 97–108)
CO2 SERPL-SCNC: 28 MMOL/L (ref 21–32)
CREAT SERPL-MCNC: 1.08 MG/DL (ref 0.7–1.3)
ERYTHROCYTE [DISTWIDTH] IN BLOOD BY AUTOMATED COUNT: 12.9 % (ref 11.5–14.5)
GLUCOSE BLD STRIP.AUTO-MCNC: 112 MG/DL (ref 65–100)
GLUCOSE BLD STRIP.AUTO-MCNC: 116 MG/DL (ref 65–100)
GLUCOSE BLD STRIP.AUTO-MCNC: 117 MG/DL (ref 65–100)
GLUCOSE BLD STRIP.AUTO-MCNC: 138 MG/DL (ref 65–100)
GLUCOSE SERPL-MCNC: 112 MG/DL (ref 65–100)
HCT VFR BLD AUTO: 37.6 % (ref 36.6–50.3)
HGB BLD-MCNC: 12.5 G/DL (ref 12.1–17)
INR PPP: 1 (ref 0.9–1.1)
MAGNESIUM SERPL-MCNC: 2.3 MG/DL (ref 1.6–2.4)
MCH RBC QN AUTO: 30.5 PG (ref 26–34)
MCHC RBC AUTO-ENTMCNC: 33.2 G/DL (ref 30–36.5)
MCV RBC AUTO: 91.7 FL (ref 80–99)
NRBC # BLD: 0 K/UL (ref 0–0.01)
NRBC BLD-RTO: 0 PER 100 WBC
PLATELET # BLD AUTO: 190 K/UL (ref 150–400)
PMV BLD AUTO: 10.3 FL (ref 8.9–12.9)
POTASSIUM SERPL-SCNC: 4.2 MMOL/L (ref 3.5–5.1)
PROTHROMBIN TIME: 10.2 SEC (ref 9–11.1)
RBC # BLD AUTO: 4.1 M/UL (ref 4.1–5.7)
SERVICE CMNT-IMP: ABNORMAL
SODIUM SERPL-SCNC: 136 MMOL/L (ref 136–145)
THERAPEUTIC RANGE,PTTT: NORMAL SECS (ref 58–77)
WBC # BLD AUTO: 9 K/UL (ref 4.1–11.1)

## 2020-07-28 PROCEDURE — 74011000250 HC RX REV CODE- 250: Performed by: NURSE PRACTITIONER

## 2020-07-28 PROCEDURE — 80048 BASIC METABOLIC PNL TOTAL CA: CPT

## 2020-07-28 PROCEDURE — 74011250637 HC RX REV CODE- 250/637: Performed by: NURSE PRACTITIONER

## 2020-07-28 PROCEDURE — 85730 THROMBOPLASTIN TIME PARTIAL: CPT

## 2020-07-28 PROCEDURE — 74011000250 HC RX REV CODE- 250: Performed by: THORACIC SURGERY (CARDIOTHORACIC VASCULAR SURGERY)

## 2020-07-28 PROCEDURE — 85027 COMPLETE CBC AUTOMATED: CPT

## 2020-07-28 PROCEDURE — 36415 COLL VENOUS BLD VENIPUNCTURE: CPT

## 2020-07-28 PROCEDURE — 82962 GLUCOSE BLOOD TEST: CPT

## 2020-07-28 PROCEDURE — 93005 ELECTROCARDIOGRAM TRACING: CPT

## 2020-07-28 PROCEDURE — 74011000258 HC RX REV CODE- 258: Performed by: THORACIC SURGERY (CARDIOTHORACIC VASCULAR SURGERY)

## 2020-07-28 PROCEDURE — 74011250636 HC RX REV CODE- 250/636: Performed by: THORACIC SURGERY (CARDIOTHORACIC VASCULAR SURGERY)

## 2020-07-28 PROCEDURE — 85610 PROTHROMBIN TIME: CPT

## 2020-07-28 PROCEDURE — 74011250637 HC RX REV CODE- 250/637: Performed by: THORACIC SURGERY (CARDIOTHORACIC VASCULAR SURGERY)

## 2020-07-28 PROCEDURE — 71045 X-RAY EXAM CHEST 1 VIEW: CPT

## 2020-07-28 PROCEDURE — 74011250637 HC RX REV CODE- 250/637: Performed by: PHYSICIAN ASSISTANT

## 2020-07-28 PROCEDURE — 65660000000 HC RM CCU STEPDOWN

## 2020-07-28 PROCEDURE — 83735 ASSAY OF MAGNESIUM: CPT

## 2020-07-28 RX ORDER — AMIODARONE HYDROCHLORIDE 200 MG/1
200 TABLET ORAL 2 TIMES DAILY
Qty: 60 TAB | Refills: 0 | Status: SHIPPED | OUTPATIENT
Start: 2020-07-28 | End: 2020-07-29 | Stop reason: SDUPTHER

## 2020-07-28 RX ORDER — METOPROLOL TARTRATE 25 MG/1
25 TABLET, FILM COATED ORAL 2 TIMES DAILY
Status: DISCONTINUED | OUTPATIENT
Start: 2020-07-28 | End: 2020-07-29 | Stop reason: HOSPADM

## 2020-07-28 RX ORDER — DILTIAZEM HYDROCHLORIDE 5 MG/ML
10 INJECTION INTRAVENOUS ONCE
Status: COMPLETED | OUTPATIENT
Start: 2020-07-28 | End: 2020-07-28

## 2020-07-28 RX ORDER — AMOXICILLIN 250 MG
1 CAPSULE ORAL 2 TIMES DAILY
Qty: 60 TAB | Refills: 0 | Status: SHIPPED | OUTPATIENT
Start: 2020-07-28 | End: 2020-08-27

## 2020-07-28 RX ORDER — METOPROLOL TARTRATE 25 MG/1
25 TABLET, FILM COATED ORAL 2 TIMES DAILY
Qty: 60 TAB | Refills: 2 | Status: SHIPPED | OUTPATIENT
Start: 2020-07-28 | End: 2020-10-26

## 2020-07-28 RX ORDER — OXYCODONE HYDROCHLORIDE 5 MG/1
5 TABLET ORAL
Qty: 20 TAB | Refills: 0 | Status: SHIPPED | OUTPATIENT
Start: 2020-07-28 | End: 2020-08-02

## 2020-07-28 RX ORDER — METOPROLOL TARTRATE 25 MG/1
12.5 TABLET, FILM COATED ORAL ONCE
Status: COMPLETED | OUTPATIENT
Start: 2020-07-28 | End: 2020-07-28

## 2020-07-28 RX ORDER — GUAIFENESIN 100 MG/5ML
81 LIQUID (ML) ORAL DAILY
Qty: 30 TAB | Refills: 11 | Status: SHIPPED
Start: 2020-07-28 | End: 2021-07-28

## 2020-07-28 RX ADMIN — AMIODARONE HYDROCHLORIDE 200 MG: 200 TABLET ORAL at 09:20

## 2020-07-28 RX ADMIN — APIXABAN 5 MG: 5 TABLET, FILM COATED ORAL at 17:34

## 2020-07-28 RX ADMIN — METOPROLOL TARTRATE 12.5 MG: 25 TABLET, FILM COATED ORAL at 09:23

## 2020-07-28 RX ADMIN — Medication 10 ML: at 22:01

## 2020-07-28 RX ADMIN — POLYETHYLENE GLYCOL 3350 17 G: 17 POWDER, FOR SOLUTION ORAL at 09:21

## 2020-07-28 RX ADMIN — FAMOTIDINE 20 MG: 20 TABLET, FILM COATED ORAL at 09:20

## 2020-07-28 RX ADMIN — FERROUS SULFATE TAB 325 MG (65 MG ELEMENTAL FE) 325 MG: 325 (65 FE) TAB at 09:20

## 2020-07-28 RX ADMIN — APIXABAN 5 MG: 5 TABLET, FILM COATED ORAL at 14:29

## 2020-07-28 RX ADMIN — ASPIRIN 81 MG CHEWABLE TABLET 81 MG: 81 TABLET CHEWABLE at 09:20

## 2020-07-28 RX ADMIN — FAMOTIDINE 20 MG: 20 TABLET, FILM COATED ORAL at 22:02

## 2020-07-28 RX ADMIN — CHLORHEXIDINE GLUCONATE 10 ML: 1.2 RINSE ORAL at 22:02

## 2020-07-28 RX ADMIN — AMIODARONE HYDROCHLORIDE 0.5 MG/MIN: 50 INJECTION, SOLUTION INTRAVENOUS at 15:14

## 2020-07-28 RX ADMIN — Medication 10 ML: at 05:28

## 2020-07-28 RX ADMIN — MAGNESIUM OXIDE 400 MG (241.3 MG MAGNESIUM) TABLET 400 MG: TABLET at 17:34

## 2020-07-28 RX ADMIN — Medication 20 ML: at 05:28

## 2020-07-28 RX ADMIN — DOCUSATE SODIUM - SENNOSIDES 1 TABLET: 50; 8.6 TABLET, FILM COATED ORAL at 17:34

## 2020-07-28 RX ADMIN — AMIODARONE HYDROCHLORIDE 200 MG: 200 TABLET ORAL at 17:34

## 2020-07-28 RX ADMIN — AMIODARONE HYDROCHLORIDE 200 MG: 200 TABLET ORAL at 22:02

## 2020-07-28 RX ADMIN — Medication 10 ML: at 22:02

## 2020-07-28 RX ADMIN — MUPIROCIN: 20 OINTMENT TOPICAL at 09:21

## 2020-07-28 RX ADMIN — AMIODARONE HYDROCHLORIDE 150 MG: 1.5 INJECTION, SOLUTION INTRAVENOUS at 14:42

## 2020-07-28 RX ADMIN — DEXTROSE MONOHYDRATE 2.5 MG/HR: 50 INJECTION, SOLUTION INTRAVENOUS at 18:42

## 2020-07-28 RX ADMIN — DILTIAZEM HYDROCHLORIDE 10 MG: 5 INJECTION INTRAVENOUS at 17:58

## 2020-07-28 RX ADMIN — DOCUSATE SODIUM - SENNOSIDES 1 TABLET: 50; 8.6 TABLET, FILM COATED ORAL at 09:20

## 2020-07-28 RX ADMIN — Medication 10 ML: at 14:47

## 2020-07-28 RX ADMIN — Medication 10 ML: at 14:48

## 2020-07-28 RX ADMIN — MAGNESIUM OXIDE 400 MG (241.3 MG MAGNESIUM) TABLET 400 MG: TABLET at 09:20

## 2020-07-28 RX ADMIN — METOPROLOL TARTRATE 25 MG: 25 TABLET, FILM COATED ORAL at 17:34

## 2020-07-28 RX ADMIN — ACETAMINOPHEN 650 MG: 325 TABLET ORAL at 05:28

## 2020-07-28 RX ADMIN — METOPROLOL TARTRATE 12.5 MG: 25 TABLET, FILM COATED ORAL at 12:14

## 2020-07-28 RX ADMIN — CHLORHEXIDINE GLUCONATE 10 ML: 1.2 RINSE ORAL at 09:23

## 2020-07-28 NOTE — PROGRESS NOTES
1945: Pt now NSR on telemetry, HR 63. Lungs clear, active BS. Denies pain. Dilt infusing at 2.5mg/h. SBP> 100. Dsg to R chest tube site intact c dry drainage. Call bell within reach. 2200: Removed soiled cover dsg to R chest tube site. Xeroform in place. Replaced 4x4s and tape. 2330: Remains in NSR. HR 67. Dilt infusing at 2.5mg/h.    7336: Bedside shift change report given to [address]ee Jeff (oncoming nurse) by Fredy Escobedo RN (offgoing nurse). Report included the following information SBAR, Kardex, Intake/Output, MAR, Recent Results and Cardiac Rhythm NSR.

## 2020-07-28 NOTE — PROGRESS NOTES
Problem: Falls - Risk of  Goal: *Absence of Falls  Description: Document Abdi Zhang Fall Risk and appropriate interventions in the flowsheet.   Outcome: Progressing Towards Goal  Note: Fall Risk Interventions:  Mobility Interventions: Assess mobility with egress test    Mentation Interventions: Adequate sleep, hydration, pain control    Medication Interventions: Assess postural VS orthostatic hypotension    Elimination Interventions: Call light in reach              Problem: Patient Education: Go to Patient Education Activity  Goal: Patient/Family Education  Outcome: Progressing Towards Goal     Problem: Patient Education: Go to Patient Education Activity  Goal: Patient/Family Education  Outcome: Progressing Towards Goal     Problem: Cardiac Valve Surgery: Day of Surgery/Post-Op (Initiate SCIP measures for post-op care)  Goal: Off Pathway (Use only if patient is Off Pathway)  Outcome: Progressing Towards Goal  Goal: Activity/Safety  Outcome: Progressing Towards Goal  Goal: Consults, if ordered  Outcome: Progressing Towards Goal  Goal: Diagnostic Test/Procedures  Outcome: Progressing Towards Goal  Goal: Nutrition/Diet  Outcome: Progressing Towards Goal  Goal: Medications  Outcome: Progressing Towards Goal  Goal: Respiratory  Outcome: Progressing Towards Goal  Goal: Treatments/Interventions/Procedures  Outcome: Progressing Towards Goal  Goal: Psychosocial  Outcome: Progressing Towards Goal  Goal: *Hemodynamically stable (eg: Cardiac output/index; pulmonary arterial pressures; mixed venous oxygen saturation within set parameters)  Outcome: Progressing Towards Goal  Goal: *Lungs clear or at baseline (eg: ABG's; oxygen saturation; end-tidal CO2  within set parameters)  Outcome: Progressing Towards Goal  Goal: *Valve type identified  Outcome: Progressing Towards Goal  Goal: *Stable cardiac rhythm  Outcome: Progressing Towards Goal  Goal: *Afebrile  Outcome: Progressing Towards Goal  Goal: *Follows simple commands post anesthesia  Outcome: Progressing Towards Goal  Goal: *Orients easily following extubation  Outcome: Progressing Towards Goal  Goal: *Optimal pain control at patient's stated goal  Outcome: Progressing Towards Goal     Problem: Cardiac Valve Surgery: Post-Op Day 1  Goal: Off Pathway (Use only if patient is Off Pathway)  Outcome: Progressing Towards Goal  Goal: Activity/Safety  Outcome: Progressing Towards Goal  Goal: Consults, if ordered  Outcome: Progressing Towards Goal  Goal: Diagnostic Test/Procedures  Outcome: Progressing Towards Goal  Goal: Nutrition/Diet  Outcome: Progressing Towards Goal  Goal: Medications  Outcome: Progressing Towards Goal  Goal: Respiratory  Outcome: Progressing Towards Goal  Goal: Treatments/Interventions/Procedures  Outcome: Progressing Towards Goal  Goal: Psychosocial  Outcome: Progressing Towards Goal  Goal: *Hemodynamically stable without vasoactive medications  Outcome: Progressing Towards Goal  Goal: *Stable cardiac rhythm  Outcome: Progressing Towards Goal  Goal: *Lungs clear or at baseline  Outcome: Progressing Towards Goal  Goal: *Mechanical ventilation discontinued  Outcome: Progressing Towards Goal  Goal: *Optimal pain control at patient's stated goal  Outcome: Progressing Towards Goal  Goal: *Demonstrates progressive activity  Outcome: Progressing Towards Goal  Goal: *Tolerating diet  Outcome: Progressing Towards Goal     Problem: Cardiac Valve Surgery: Post-Op Day 2  Goal: Off Pathway (Use only if patient is Off Pathway)  Outcome: Progressing Towards Goal  Goal: Activity/Safety  Outcome: Progressing Towards Goal  Goal: Consults, if ordered  Outcome: Progressing Towards Goal  Goal: Diagnostic Test/Procedures  Outcome: Progressing Towards Goal  Goal: Nutrition/Diet  Outcome: Progressing Towards Goal  Goal: Discharge Planning  Outcome: Progressing Towards Goal  Goal: Medications  Outcome: Progressing Towards Goal  Goal: Respiratory  Outcome: Progressing Towards Goal  Goal: Treatments/Interventions/Procedures  Outcome: Progressing Towards Goal  Goal: Psychosocial  Outcome: Progressing Towards Goal  Goal: *Hemodynamically stable without vasoactive medications  Outcome: Progressing Towards Goal  Goal: *Stable cardiac rhythm  Outcome: Progressing Towards Goal  Goal: *Lungs clear or at baseline  Outcome: Progressing Towards Goal  Goal: *Mechanical ventilation discontinued  Outcome: Progressing Towards Goal  Goal: *Optimal pain control at patient's stated goal  Outcome: Progressing Towards Goal  Goal: *Demonstrates progressive activity  Outcome: Progressing Towards Goal  Goal: *Tolerating diet  Outcome: Progressing Towards Goal  Goal: *Incisions without signs and symptoms of wound complication  Outcome: Progressing Towards Goal     Problem: Cardiac Valve Surgery: Post-Op Day 3  Goal: Off Pathway (Use only if patient is Off Pathway)  Outcome: Progressing Towards Goal  Goal: Activity/Safety  Outcome: Progressing Towards Goal  Goal: Diagnostic Test/Procedures  Outcome: Progressing Towards Goal  Goal: Nutrition/Diet  Outcome: Progressing Towards Goal  Goal: Discharge Planning  Outcome: Progressing Towards Goal  Goal: Medications  Outcome: Progressing Towards Goal  Goal: Respiratory  Outcome: Progressing Towards Goal  Goal: Treatments/Interventions/Procedures  Outcome: Progressing Towards Goal  Goal: Psychosocial  Outcome: Progressing Towards Goal  Goal: *Hemodynamically stable without vasoactive medications  Outcome: Progressing Towards Goal  Goal: *Stable cardiac rhythm  Outcome: Progressing Towards Goal  Goal: *Lungs clear or at baseline  Outcome: Progressing Towards Goal  Goal: *Optimal pain control at patient's stated goal  Outcome: Progressing Towards Goal  Goal: *Incisions without signs and symptoms of wound complication  Outcome: Progressing Towards Goal  Goal: *Demonstrates progressive activity  Outcome: Progressing Towards Goal  Goal: *Tolerating diet  Outcome: Progressing Towards Goal     Problem: Cardiac Valve Surgery: Post-Op Day 4  Goal: Off Pathway (Use only if patient is Off Pathway)  Outcome: Progressing Towards Goal  Goal: Activity/Safety  Outcome: Progressing Towards Goal  Goal: Diagnostic Test/Procedures  Outcome: Progressing Towards Goal  Goal: Nutrition/Diet  Outcome: Progressing Towards Goal  Goal: Discharge Planning  Outcome: Progressing Towards Goal  Goal: Medications  Outcome: Progressing Towards Goal  Goal: Respiratory  Outcome: Progressing Towards Goal  Goal: Treatments/Interventions/Procedures  Outcome: Progressing Towards Goal  Goal: Psychosocial  Outcome: Progressing Towards Goal  Goal: *Hemodynamically stable  Outcome: Progressing Towards Goal  Goal: *Stable cardiac rhythm  Outcome: Progressing Towards Goal  Goal: *Lungs clear or at baseline  Outcome: Progressing Towards Goal  Goal: *Optimal pain control at patient's stated goal  Outcome: Progressing Towards Goal  Goal: *Incisions without signs and symptoms of wound complication  Outcome: Progressing Towards Goal  Goal: *Ambulating and performing exercises with assistance  Outcome: Progressing Towards Goal  Goal: *Demonstrates progressive activity  Outcome: Progressing Towards Goal  Goal: *Tolerating diet  Outcome: Progressing Towards Goal     Problem: Cardiac Valve Surgery: Post-Op Day 5  Goal: Off Pathway (Use only if patient is Off Pathway)  Outcome: Progressing Towards Goal  Goal: Activity/Safety  Outcome: Progressing Towards Goal  Goal: Diagnostic Test/Procedures  Outcome: Progressing Towards Goal  Goal: Nutrition/Diet  Outcome: Progressing Towards Goal  Goal: Discharge Planning  Outcome: Progressing Towards Goal  Goal: Medications  Outcome: Progressing Towards Goal  Goal: Respiratory  Outcome: Progressing Towards Goal  Goal: Treatments/Interventions/Procedures  Outcome: Progressing Towards Goal  Goal: Psychosocial  Outcome: Progressing Towards Goal  Goal: *Hemodynamically stable  Outcome: Progressing Towards Goal  Goal: *Stable cardiac rhythm  Outcome: Progressing Towards Goal  Goal: *Lungs clear or at baseline  Outcome: Progressing Towards Goal  Goal: *Optimal pain control at patient's stated goal  Outcome: Progressing Towards Goal  Goal: *Incisions without signs and symptoms of wound complication  Outcome: Progressing Towards Goal  Goal: *Ambulating and performing exercises with assistance  Outcome: Progressing Towards Goal  Goal: *Demonstrates progressive activity  Outcome: Progressing Towards Goal  Goal: *Tolerating diet  Outcome: Progressing Towards Goal     Problem: Cardiac Valve Surgery: Post-Op Day 6  Goal: Off Pathway (Use only if patient is Off Pathway)  Outcome: Progressing Towards Goal  Goal: Activity/Safety  Outcome: Progressing Towards Goal  Goal: Diagnostic Test/Procedures  Outcome: Progressing Towards Goal  Goal: Nutrition/Diet  Outcome: Progressing Towards Goal  Goal: Discharge Planning  Outcome: Progressing Towards Goal  Goal: Medications  Outcome: Progressing Towards Goal  Goal: Respiratory  Outcome: Progressing Towards Goal  Goal: Treatments/Interventions/Procedures  Outcome: Progressing Towards Goal  Goal: Psychosocial  Outcome: Progressing Towards Goal     Problem: Cardiac Valve Surgery: Discharge Outcomes  Goal: *Hemodynamically stable  Outcome: Progressing Towards Goal  Goal: *Stable cardiac rhythm  Outcome: Progressing Towards Goal  Goal: *Lungs clear or at baseline  Outcome: Progressing Towards Goal  Goal: *Demonstrates ability to perform prescribed activity without shortness of breath or discomfort  Outcome: Progressing Towards Goal  Goal: *Verbalizes home exercise program, activity guidelines, cardiac precautions  Outcome: Progressing Towards Goal  Goal: *Optimal pain control at patient's stated goal  Outcome: Progressing Towards Goal  Goal: *Verbalizes understanding and describes prescribed diet  Outcome: Progressing Towards Goal  Goal: *Verbalizes name, dosage, time, side effects, and number of days to continue medications  Outcome: Progressing Towards Goal  Goal: *Weight  is stable  Outcome: Progressing Towards Goal  Goal: *No signs and symptoms of infection or wound complications  Outcome: Progressing Towards Goal  Goal: *Anxiety reduced or absent  Outcome: Progressing Towards Goal  Goal: *Verbalizes and demonstrates incision care  Outcome: Progressing Towards Goal  Goal: *Understands and describes signs and symptoms to report to providers(Stroke Metric)  Outcome: Progressing Towards Goal  Goal: *Describes follow-up/return visits to physicians  Outcome: Progressing Towards Goal  Goal: *Describes available resources and support systems  Outcome: Progressing Towards Goal  Goal: *Expresses feelings about diagnosis and surgery  Outcome: Progressing Towards Goal  Goal: *Influenza immunization  Outcome: Progressing Towards Goal  Goal: *Pneumococcal immunization  Outcome: Progressing Towards Goal

## 2020-07-28 NOTE — PROGRESS NOTES
RAPID RESPONSE TEAM- Follow Up     Rounded on patient due to transfer from CCU; s/p MVR. Spoke with primary RN Scott Mendiola. No acute concerns at this time. VSS. MEWS 1. No RRT interventions indicated at this time. Please call back if needed.      Vincent Rod RN  Rapid Response Team  Ext# [phone]    Patient Vitals for the past 12 hrs:   Temp Pulse Resp BP SpO2   07/27/20 1703  79  127/74    07/27/20 1503 98 °F (36.7 °C) 74 18 132/68 100 %   07/27/20 1023 98.6 °F (37 °C) 73 18 123/63 98 %   07/27/20 1000 98.8 °F (37.1 °C) 72 14 113/66 99 %   07/27/20 0900  76 20 118/66 97 %

## 2020-07-28 NOTE — PROGRESS NOTES
0700: Bedside and Verbal shift change report given to Nuris King, RN (oncoming nurse) by Lissette Mcnally (offgoing nurse). Report included the following information SBAR, Kardex, Procedure Summary, Intake/Output, MAR, Recent Results and Cardiac Rhythm NSR. VS taken; MEWS score 1. Pt resting in recliner, no c/o pain at this time. Groin site and R thoracotomy site CDI.     0920: Pt HR in 110s-130s. Scheduled am meds, including amiodarone and metoprolol PO given; RN to monitor HR. Pt encouraged to rest in recliner for 30 min. 1048: VS taken; MEWS score 1. Pt resting in bed, NAD.     1145: Discharge paperwork d/w pt; no questions at this time. 1200: Pt HR into 130s, 140s. Sayra paged; orders received for one time dose of Metoprolol 12.5 mg PO. Colbert to assess pt. 1240: Orders for EKG placed. 1250: EKG obtained; pt in Afib RVR. Colbert updated. Orders for cancel discharge; adding amio bolus. Pt stated understanding. 1442: Amio bolus initiated. Pt in bed, VSS. 1720: Provider Pipe Lockwood in room to assess pt. Per MD, orders added for Diltiazem IV 10mg one time dose. If Diltiazem IV medication works, then switch amiodarone infusion to diltiazem infusion. If pt HR remains in Afib, then pt to be NPO after MN tonight for possible cardioversion tomorrow am.     1812: Orders for diltiazem infusion placed. 1900: Bedside and Verbal shift change report given to Sonia Corrigan, Lissette (oncoming nurse) by Lissette Montes (offgoing nurse). Report included the following information SBAR, Kardex, Intake/Output, MAR, Recent Results and Cardiac Rhythm NSR, Afib RVR.

## 2020-07-28 NOTE — PROGRESS NOTES
RAPID RESPONSE TEAM- Follow Up     Rounded on patient due to recent transfer out of CCU. Discussed with primary nurse. Pt back in Afib RVR. Pt asymptomatic. D/c cxld. Pt started on amio po and Cardizem IVP. VSS MEWS 1    Patient Vitals for the past 8 hrs:   Temp Pulse Resp BP SpO2   07/28/20 1734  (!) 109  117/61    07/28/20 1543 99 °F (37.2 °C) 99 16 101/46 98 %   07/28/20 1214  (!) 122      07/28/20 1048 97.8 °F (36.6 °C) 97 16 116/62 99 %          No RRT interventions indicated at this time. Please call back if needed.      David Field RN  RRT, Ext. [phone]

## 2020-07-28 NOTE — PROGRESS NOTES
PRO:  1) Home with [facility] RN   2) FU appts  3) Family to transport at d/c    9:53 AM: [facility] accepted pt but pt will have a $45 copay per visit. CM spoke with pt regarding copay and he was agreeable to going ahead with the [facility]. [facility] notified. Pt's PCP requires pt's to set up their own appts. AVS updated and pt informed. No further needs at this time. Pt is clear to discharge from a care management standpoint. RN notified. Care Management Interventions  PCP Verified by CM: Yes  Last Visit to PCP: 10/01/19  Mode of Transport at Discharge: Other (see comment)(family)  Transition of Care Consult (CM Consult): [address]: No  Reason Outside [place]: Unable to staff case  Discharge Durable Medical Equipment: No  Health Maintenance Reviewed: Yes  Physical Therapy Consult: Yes  Occupational Therapy Consult: Yes  Speech Therapy Consult: No  Current Support Network: Lives with Spouse, Family Lives Nearby(patient lives with spouse and son in a one level ranch home with 3 steps to enter and exit home.)  Confirm Follow Up Transport: Family  The Patient and/or Patient Representative was Provided with a Choice of Provider and Agrees with the Discharge Plan?: No  Freedom of Choice List was Provided with Basic Dialogue that Supports the Patient's Individualized Plan of Care/Goals, Treatment Preferences and Shares the Quality Data Associated with the Providers?: No  [employer] Information Provided?: No  Discharge Location  Discharge Placement: Home with home health      9:32 AM: Discharge order and case management consult noted. Pt was denied for [facility] services by [facility] and new referral sent to [facility].      Rosalba Mart, RN ,CM  Jose Wise  [phone]

## 2020-07-28 NOTE — PROGRESS NOTES
Bedside shift change report given to Colby Alexander RN (oncoming nurse) by Leyda Yee (offgoing nurse). Report included the following information SBAR, Kardex, Intake/Output, MAR, Recent Results and Cardiac Rhythm a fib.

## 2020-07-28 NOTE — PROGRESS NOTES
HR irregular to 130    EKG AFIB    BP > 100    Does not feel bad    Cancel discharge    Will start Eliquis, increase BB, continue PO amio and start Amio bolus and infusion    Reviewed with Dr. Saba Wasserman

## 2020-07-28 NOTE — DISCHARGE SUMMARY
[facility] CSS Discharge Summary     Patient ID:  Concepcion Krause  [id number]  48 y.o.  1970    POD: 6 Days Post-Op      Admit date: 7/23/2020    Discharge date: 7/29/2020     Discharge to: HOME    Admitting Physician: Connor Hannon MD     Referring Cardiologist:  Nora Arndt    PCP:  Barak Adams MD      Active Problems:    Mitral regurgitation (7/22/2020)      S/P MVR (mitral valve repair) (7/23/2020)      Overview: MITRAL VALVE REPAIR 38mm CG Future Band Via Mini Right Thoracotomy      RIGHT FEMORAL ARTERIAL AND VENOUS CANNULATION             Procedure(s):  JAENIE BY DR PHILLIPS - MITRAL VALVE REPAIR  WITH RIGHT  THORACOTOMY - RIGHT GROIN CANNULATION     Discharge Medications:   Current Discharge Medication List      START taking these medications    Details   apixaban (ELIQUIS) 5 mg tablet Take 1 Tab by mouth two (2) times a day for 30 days. Qty: 60 Tab, Refills: 0      dilTIAZem ER (CARDIZEM CD) 120 mg capsule Take 1 Cap by mouth daily for 30 days. Qty: 30 Cap, Refills: 0      aspirin 81 mg chewable tablet Take 1 Tab by mouth daily. Qty: 30 Tab, Refills: 11      oxyCODONE IR (ROXICODONE) 5 mg immediate release tablet Take 1 Tab by mouth every six (6) hours as needed for Pain for up to 5 days. Max Daily Amount: 20 mg.  Qty: 20 Tab, Refills: 0    Associated Diagnoses: S/P MVR (mitral valve repair)      senna-docusate (PERICOLACE) 8.6-50 mg per tablet Take 1 Tab by mouth two (2) times a day for 30 days. Qty: 60 Tab, Refills: 0         CONTINUE these medications which have CHANGED    Details   amiodarone (CORDARONE) 200 mg tablet Take 1 Tab by mouth every eight (8) hours for 30 days. Qty: 90 Tab, Refills: 0      metoprolol tartrate (LOPRESSOR) 25 mg tablet Take 1 Tab by mouth two (2) times a day for 90 days. Qty: 60 Tab, Refills: 2         CONTINUE these medications which have NOT CHANGED    Details   multivit-minerals/FA/lycopene (ONE-A-DAY MEN'S PO) Take  by mouth daily.          STOP taking these medications       mupirocin (BACTROBAN) 2 % ointment Comments:   Reason for Stopping:         chlorhexidine (Peridex) 0.12 % solution Comments:   Reason for Stopping:               Hospital Course:   No changes in finding from below since last office visit.        Diagnosis/Reason for Consultation: The primary encounter diagnosis was Nonrheumatic mitral valve regurgitation. A diagnosis of Gastroesophageal reflux disease without esophagitis was also pertinent to this visit.     Problem List: MR, Atypical Chest Pain, Dyspnea, Murmur, Obesity, GERD      HPI: 49 y. o.  male who is a [facility] PMHx of MR, Obesity,and Atypical Chest Pain that is referred to the [address] [place] [address] interventional evaluation of his severe mitral regurgitation. .Mr. Cheyenne Cota has complaints of fatigue,chest pain since he was a child, and dyspnea on exertion for about six months. He has undergone extensive cardiac work up with Dr. Cammy Jensen including TTE, JEANIE, and Cardiac Cath. He was noted to have Severe Mitral Regurgitation and was referred for surgical consultation.     He is not on any medication currently except multivitamin. He is a nonsmoker and rarely drinks alcohol.     NYHA Classification: Class II              Class II (Mild): Slight limitation of physical activity. Comfortable at rest, but ordinary physical activity results in fatigue, palpitation, or dyspnea.                   Angina Classification: Class 0              Class 0: No symptoms     Admit Date: 7/23/2020  POD:  6 Day Post-Op     Procedure:  Procedure(s):  JEANIE BY DR PHILLIPS - MITRAL VALVE REPAIR  WITH RIGHT  THORACOTOMY - RIGHT GROIN CANNULATION         Subjective:   Pt seen with Dr. Sergei Waterman    AFIB before discharge yesterday.  Amio and Dilt infusions    Converted to SR after Dilt infusion started about 1900 yesterday     Ambulating selfcare  Try to discharge again  Add Cardizem PO  Increase Amio back to 600 mg daily  Minimal discomfort  Instructions given      Objective:   Vitals:  Blood pressure 120/68, pulse 76, temperature 98.2 °F (36.8 °C), resp. rate 16, height 6' (1.829 m), weight 227 lb 4.7 oz (103.1 kg), SpO2 98 %. Temp (24hrs), Av.6 °F (37 °C), Min:98 °F (36.7 °C), Max:99.3 °F (37.4 °C)    EKG/Rhythm:  NSR 80s     Extubation Date / Time: 20      Oxygen Therapy:  Oxygen Therapy  O2 Sat (%): 98 % (20 0711)  Pulse via Oximetry: 73 beats per minute (20 1503)  O2 Device: Room air (20 1908)  O2 Flow Rate (L/min): 2 l/min (20 1600)  FIO2 (%): 50 % (20 191)     CXR:           CXR Results  (Last 48 hours)                 20 0530   XR CHEST PORT Final result     Impression:   IMPRESSION: No significant interval change.                Narrative:   Portable chest single view dated 2020       Comparison chest dated 2020       History is postop heart       A single frontal view of the chest was obtained. It is difficult to accurately   assess the size of the cardiac silhouette. The prosthetic cardiac valve is again   noted. There continues to be a somewhat well-demarcated soft tissue density in   the lateral aspect of the right mid lung. In retrospect, this finding was   present on the previous examination, however, it was somewhat less conspicuous   at that time.            20 0751   XR CHEST PORT Final result     Impression:   IMPRESSION:   1. No significant change in the appearance of the chest or support hardware.                   Narrative:   INDICATION: . post op heart   COMPARISON: Previous chest xray, yesterday. LIMITATIONS: Portable technique. [facility] FINDINGS: Single frontal view of the chest.    .   Lines/tubes/surgical: No significant change in the appearance of the   mediastinal/chest drains. Epicardial pacing wires remain. Heart/mediastinum: Status post valvuloplasty. Lungs/pleura: No visible focal consolidation or acute process.  No visualized   pleural effusion or pneumothorax. Additional Comments: None. .               Admission Weight: Last Weight   Weight: 225 lb 8.5 oz (102.3 kg) Weight: 227 lb 4.7 oz (103.1 kg)      Intake / Output / Drain:  Current Shift: No intake/output data recorded. Last 24 hrs.:     Intake/Output Summary (Last 24 hours) at 2020 0824  Last data filed at 2020 0413      Gross per 24 hour   Intake 790 ml   Output 2900 ml   Net -2110 ml        EXAM:  General:   Alert, NAD                                                                                           Lungs:   Clear upper to auscultation bilaterally. Incision:  dsg cdi   Heart:  Regular rate and rhythm, S1, S2 normal, no murmur, click, rub or gallop. Abdomen:   Soft, non-tender. Bowel sounds active. No masses,  No organomegaly. Extremities:  No edema. PPP. Neurologic:  Gross motor and sensory apparatus intact.      Labs:   Recent Labs     20  0728 20  0535   WBC  --  9.0   HGB  --  12.5   HCT  --  37.6   PLT  --  190   NA  --  136   K  --  4.2   BUN  --  12   CREA  --  1.08   GLU  --  112*   GLUCPOC 116*  --    INR  --  1.0         Assessment:      Active Problems:    Mitral regurgitation (2020)       S/P MVR (mitral valve repair) (2020)      Overview: MITRAL VALVE REPAIR 38mm CG Future Band Via Mini Right Thoracotomy      RIGHT FEMORAL ARTERIAL AND VENOUS CANNULATION                 Plan/Recommendations/Medical Decision Makin. MR s/p MVr mini right thoracotomy: On ASA  2. Atelectasis: Encourage I/S, on RA  3. Post op anemia st acute blood loss: Monitor H&H and CT output  4. Thrombocytopenia: mild, monitor plt  5. Arrhythmia: bradycardic initially post op, had afib with RVR on Saturday. Now NSR. On PO amio/dilt. 6. Pain control: remove onq, prn tylenol and oxycodone  7. Elevated Cr: improved  8. Elevated Tbili: Tbili 1.6 preop, back to baseline     10.  Dispo: dc home     Labs:   Recent Labs 07/29/20  0405 07/28/20  0535   WBC 10.0 9.0   HGB 12.2 12.5   HCT 36.4* 37.6   CREA 1.05 1.08   INR  --  1.0       CXR Results  (Last 48 hours)               07/28/20 0530  XR CHEST PORT Final result    Impression:  IMPRESSION: No significant interval change. Narrative:  Portable chest single view dated 7/28/2020       Comparison chest dated 7/27/2020       History is postop heart       A single frontal view of the chest was obtained. It is difficult to accurately   assess the size of the cardiac silhouette. The prosthetic cardiac valve is again   noted. There continues to be a somewhat well-demarcated soft tissue density in   the lateral aspect of the right mid lung. In retrospect, this finding was   present on the previous examination, however, it was somewhat less conspicuous   at that time. Patient Instructions: See Discharge Instructions  Patient was given a separate list of discharge instructions and prescriptions. At this time, all questions were answered and discharge medications were reviewed. The patient has a follow up appointment in one week.       Signed:  PAYAM Landrum  7/29/2020  8:26 AM

## 2020-07-28 NOTE — PROGRESS NOTES
[facility] ICU Progress Note    Admit Date: 2020  POD:  5 Day Post-Op    Procedure:  Procedure(s):  JEANIE BY DR PHILLIPS - MITRAL VALVE REPAIR  WITH RIGHT  THORACOTOMY - RIGHT GROIN CANNULATION        Subjective:   Pt seen with Dr. Henry Burkett selfcare  Ready for discharge  Minimal discomfort  Instructions given     Objective:   Vitals:  Blood pressure 120/68, pulse 76, temperature 98.2 °F (36.8 °C), resp. rate 16, height 6' (1.829 m), weight 227 lb 4.7 oz (103.1 kg), SpO2 98 %. Temp (24hrs), Av.6 °F (37 °C), Min:98 °F (36.7 °C), Max:99.3 °F (37.4 °C)    EKG/Rhythm:  NSR 70s    Extubation Date / Time: 20     Oxygen Therapy:  Oxygen Therapy  O2 Sat (%): 98 % (20 0711)  Pulse via Oximetry: 73 beats per minute (20 1503)  O2 Device: Room air (20)  O2 Flow Rate (L/min): 2 l/min (20 1600)  FIO2 (%): 50 % (20)    CXR:   CXR Results  (Last 48 hours)               20 0530  XR CHEST PORT Final result    Impression:  IMPRESSION: No significant interval change. Narrative:  Portable chest single view dated 2020       Comparison chest dated 2020       History is postop heart       A single frontal view of the chest was obtained. It is difficult to accurately   assess the size of the cardiac silhouette. The prosthetic cardiac valve is again   noted. There continues to be a somewhat well-demarcated soft tissue density in   the lateral aspect of the right mid lung. In retrospect, this finding was   present on the previous examination, however, it was somewhat less conspicuous   at that time. 20 0751  XR CHEST PORT Final result    Impression:  IMPRESSION:   1. No significant change in the appearance of the chest or support hardware. Narrative:  INDICATION: . post op heart   COMPARISON: Previous chest xray, yesterday. LIMITATIONS: Portable technique. [facility] FINDINGS: Single frontal view of the chest.    . Lines/tubes/surgical: No significant change in the appearance of the   mediastinal/chest drains. Epicardial pacing wires remain. Heart/mediastinum: Status post valvuloplasty. Lungs/pleura: No visible focal consolidation or acute process. No visualized   pleural effusion or pneumothorax. Additional Comments: None. .             Admission Weight: Last Weight   Weight: 225 lb 8.5 oz (102.3 kg) Weight: 227 lb 4.7 oz (103.1 kg)     Intake / Output / Drain:  Current Shift: No intake/output data recorded. Last 24 hrs.:     Intake/Output Summary (Last 24 hours) at 2020 08  Last data filed at 2020 0413  Gross per 24 hour   Intake 790 ml   Output 2900 ml   Net -2110 ml       EXAM:  General:   Alert, NAD                                                                                           Lungs:   Clear upper to auscultation bilaterally. Incision:  dsg cdi   Heart:  Regular rate and rhythm, S1, S2 normal, no murmur, click, rub or gallop. Abdomen:   Soft, non-tender. Bowel sounds active. No masses,  No organomegaly. Extremities:  No edema. PPP. Neurologic:  Gross motor and sensory apparatus intact. Labs:   Recent Labs     20  0728 20  0535   WBC  --  9.0   HGB  --  12.5   HCT  --  37.6   PLT  --  190   NA  --  136   K  --  4.2   BUN  --  12   CREA  --  1.08   GLU  --  112*   GLUCPOC 116*  --    INR  --  1.0        Assessment:     Active Problems:    Mitral regurgitation (2020)      S/P MVR (mitral valve repair) (2020)      Overview: MITRAL VALVE REPAIR 38mm CG Future Band Via Mini Right Thoracotomy      RIGHT FEMORAL ARTERIAL AND VENOUS CANNULATION               Plan/Recommendations/Medical Decision Makin. MR s/p MVr mini right thoracotomy: On ASA  2. Atelectasis: Encourage I/S, on RA  3. Post op anemia st acute blood loss: Monitor H&H and CT output  4. Thrombocytopenia: mild, monitor plt  5.  Arrhythmia: bradycardic initially post op, had afib with RVR on Saturday. Now NSR. On PO amio. 6. Pain control: remove onq, prn tylenol and oxycodone  7. Elevated Cr: improved  8. Elevated Tbili: Tbili 1.6 preop, back to baseline    10.  Dispo: dc home     Signed By: PAYAM Humphrey

## 2020-07-28 NOTE — PROGRESS NOTES
Problem: Falls - Risk of  Goal: *Absence of Falls  Description: Document Tish Dear Fall Risk and appropriate interventions in the flowsheet.   Outcome: Progressing Towards Goal  Note: Fall Risk Interventions:  Mobility Interventions: Assess mobility with egress test, Bed/chair exit alarm, Communicate number of staff needed for ambulation/transfer, Patient to call before getting OOB    Mentation Interventions: Adequate sleep, hydration, pain control, Evaluate medications/consider consulting pharmacy    Medication Interventions: Patient to call before getting OOB, Teach patient to arise slowly    Elimination Interventions: Bed/chair exit alarm, Call light in reach, Patient to call for help with toileting needs, Urinal in reach              Problem: Patient Education: Go to Patient Education Activity  Goal: Patient/Family Education  Outcome: Progressing Towards Goal     Problem: Patient Education: Go to Patient Education Activity  Goal: Patient/Family Education  Outcome: Progressing Towards Goal     Problem: Cardiac Valve Surgery: Day of Surgery/Post-Op (Initiate SCIP measures for post-op care)  Goal: Off Pathway (Use only if patient is Off Pathway)  Outcome: Progressing Towards Goal  Goal: Activity/Safety  Outcome: Progressing Towards Goal  Goal: Consults, if ordered  Outcome: Progressing Towards Goal  Goal: Diagnostic Test/Procedures  Outcome: Progressing Towards Goal  Goal: Nutrition/Diet  Outcome: Progressing Towards Goal  Goal: Medications  Outcome: Progressing Towards Goal  Goal: Respiratory  Outcome: Progressing Towards Goal  Goal: Treatments/Interventions/Procedures  Outcome: Progressing Towards Goal  Goal: Psychosocial  Outcome: Progressing Towards Goal  Goal: *Hemodynamically stable (eg: Cardiac output/index; pulmonary arterial pressures; mixed venous oxygen saturation within set parameters)  Outcome: Progressing Towards Goal  Goal: *Lungs clear or at baseline (eg: ABG's; oxygen saturation; end-tidal CO2 within set parameters)  Outcome: Progressing Towards Goal  Goal: *Valve type identified  Outcome: Progressing Towards Goal  Goal: *Stable cardiac rhythm  Outcome: Progressing Towards Goal  Goal: *Afebrile  Outcome: Progressing Towards Goal  Goal: *Follows simple commands post anesthesia  Outcome: Progressing Towards Goal  Goal: *Orients easily following extubation  Outcome: Progressing Towards Goal  Goal: *Optimal pain control at patient's stated goal  Outcome: Progressing Towards Goal     Problem: Cardiac Valve Surgery: Post-Op Day 1  Goal: Off Pathway (Use only if patient is Off Pathway)  Outcome: Progressing Towards Goal     Problem: Cardiac Valve Surgery: Post-Op Day 2  Goal: Off Pathway (Use only if patient is Off Pathway)  Outcome: Progressing Towards Goal  Goal: Activity/Safety  Outcome: Progressing Towards Goal  Goal: Consults, if ordered  Outcome: Progressing Towards Goal  Goal: Diagnostic Test/Procedures  Outcome: Progressing Towards Goal  Goal: Nutrition/Diet  Outcome: Progressing Towards Goal  Goal: Discharge Planning  Outcome: Progressing Towards Goal  Goal: Medications  Outcome: Progressing Towards Goal  Goal: Respiratory  Outcome: Progressing Towards Goal  Goal: Treatments/Interventions/Procedures  Outcome: Progressing Towards Goal  Goal: Psychosocial  Outcome: Progressing Towards Goal  Goal: *Hemodynamically stable without vasoactive medications  Outcome: Progressing Towards Goal  Goal: *Stable cardiac rhythm  Outcome: Progressing Towards Goal  Goal: *Lungs clear or at baseline  Outcome: Progressing Towards Goal  Goal: *Mechanical ventilation discontinued  Outcome: Progressing Towards Goal  Goal: *Optimal pain control at patient's stated goal  Outcome: Progressing Towards Goal  Goal: *Demonstrates progressive activity  Outcome: Progressing Towards Goal  Goal: *Tolerating diet  Outcome: Progressing Towards Goal  Goal: *Incisions without signs and symptoms of wound complication  Outcome: Progressing Towards Goal     Problem: Cardiac Valve Surgery: Post-Op Day 3  Goal: Off Pathway (Use only if patient is Off Pathway)  Outcome: Progressing Towards Goal  Goal: Activity/Safety  Outcome: Progressing Towards Goal  Goal: Diagnostic Test/Procedures  Outcome: Progressing Towards Goal  Goal: Nutrition/Diet  Outcome: Progressing Towards Goal  Goal: Discharge Planning  Outcome: Progressing Towards Goal  Goal: Medications  Outcome: Progressing Towards Goal  Goal: Respiratory  Outcome: Progressing Towards Goal  Goal: Treatments/Interventions/Procedures  Outcome: Progressing Towards Goal  Goal: Psychosocial  Outcome: Progressing Towards Goal  Goal: *Hemodynamically stable without vasoactive medications  Outcome: Progressing Towards Goal  Goal: *Stable cardiac rhythm  Outcome: Progressing Towards Goal  Goal: *Lungs clear or at baseline  Outcome: Progressing Towards Goal  Goal: *Optimal pain control at patient's stated goal  Outcome: Progressing Towards Goal  Goal: *Incisions without signs and symptoms of wound complication  Outcome: Progressing Towards Goal  Goal: *Demonstrates progressive activity  Outcome: Progressing Towards Goal  Goal: *Tolerating diet  Outcome: Progressing Towards Goal     Problem: Cardiac Valve Surgery: Post-Op Day 4  Goal: Off Pathway (Use only if patient is Off Pathway)  Outcome: Progressing Towards Goal  Goal: Activity/Safety  Outcome: Progressing Towards Goal  Goal: Diagnostic Test/Procedures  Outcome: Progressing Towards Goal  Goal: Nutrition/Diet  Outcome: Progressing Towards Goal  Goal: Discharge Planning  Outcome: Progressing Towards Goal  Goal: Medications  Outcome: Progressing Towards Goal  Goal: Respiratory  Outcome: Progressing Towards Goal  Goal: Treatments/Interventions/Procedures  Outcome: Progressing Towards Goal  Goal: Psychosocial  Outcome: Progressing Towards Goal  Goal: *Hemodynamically stable  Outcome: Progressing Towards Goal  Goal: *Stable cardiac rhythm  Outcome: Progressing Towards Goal  Goal: *Lungs clear or at baseline  Outcome: Progressing Towards Goal  Goal: *Optimal pain control at patient's stated goal  Outcome: Progressing Towards Goal  Goal: *Incisions without signs and symptoms of wound complication  Outcome: Progressing Towards Goal  Goal: *Ambulating and performing exercises with assistance  Outcome: Progressing Towards Goal  Goal: *Demonstrates progressive activity  Outcome: Progressing Towards Goal  Goal: *Tolerating diet  Outcome: Progressing Towards Goal     Problem: Cardiac Valve Surgery: Discharge Outcomes  Goal: *Hemodynamically stable  Outcome: Progressing Towards Goal  Goal: *Stable cardiac rhythm  Outcome: Progressing Towards Goal  Goal: *Lungs clear or at baseline  Outcome: Progressing Towards Goal  Goal: *Demonstrates ability to perform prescribed activity without shortness of breath or discomfort  Outcome: Progressing Towards Goal  Goal: *Verbalizes home exercise program, activity guidelines, cardiac precautions  Outcome: Progressing Towards Goal  Goal: *Optimal pain control at patient's stated goal  Outcome: Progressing Towards Goal  Goal: *Verbalizes understanding and describes prescribed diet  Outcome: Progressing Towards Goal  Goal: *Verbalizes name, dosage, time, side effects, and number of days to continue medications  Outcome: Progressing Towards Goal  Goal: *Weight  is stable  Outcome: Progressing Towards Goal  Goal: *No signs and symptoms of infection or wound complications  Outcome: Progressing Towards Goal  Goal: *Anxiety reduced or absent  Outcome: Progressing Towards Goal  Goal: *Verbalizes and demonstrates incision care  Outcome: Progressing Towards Goal  Goal: *Understands and describes signs and symptoms to report to providers(Stroke Metric)  Outcome: Progressing Towards Goal  Goal: *Describes follow-up/return visits to physicians  Outcome: Progressing Towards Goal  Goal: *Describes available resources and support systems  Outcome: Progressing Towards Goal  Goal: *Expresses feelings about diagnosis and surgery  Outcome: Progressing Towards Goal

## 2020-07-28 NOTE — DISCHARGE INSTRUCTIONS
Cardiac Surgery Specialist    [address] [facility] [address]                                          [place] [address]                                       [address], [address] [facility]  Office- [phone]  Fax- [phone]       Office- [phone]  Fax- [phone]  _____________________________________________________________  Dr. Samantha hartmann PA-C       Name:Lino Bryson       Discharge Date: 7/28/20     Discharge to: Home    INSTRUCTIONS:  NO SMOKING OR TOBACCO PRODUCTS  You may shower. Wash all incisions twice daily with mild soap and water. No lotions, ointments or powder. Call the office immediately for any redness, swelling, or drainage from your incision. Take your temperature daily and call for a temperature of 101 degrees or higher or for any symptoms that make you think you have and infection. Weigh yourself each morning. Call if you gain more than 5 pounds in 48 hours. Use the incentive spirometer 6-8 times a day-10 breaths each time. Walk several hundred feet several times daily. DIET  Eat an American Heart Association diet. If you are having trouble with your appetite, eat what you can. Try eating small, frequent meals throughout the day. ACTIVITY  No strenuous activity for a week. No driving while taking pain medication (narcotics). Increase your activity by walking several times a day. Stay out of bed most of the day. When sitting, keep your legs elevated. You may ride in a car, but you must get out every hour and walk around. FOLLOW UP           1.  Your first follow up appointment with your surgeon's PA or NP will be on 8/3/20 at 11:30am. This will be a virtual phone appointment. 2. Your second appointment with your surgeon will be on 8/24/20 at 2:45pm. This appointment will be at [facility], [address], [address]. You may have one person with you at this visit. 3. Please call our office at [phone] if you are unable to make either one of these appointments. 4. Dr. Ryan Salcedo nurse will call you with a follow-up appointment to be seen approximately 2 weeks after your 1 month follow-up with Dr. Magali Lopez. If you do not hear from them, call their office at [phone]. 5. Consult you primary care physician regarding your influenza & pneumovax vaccines. 6. PLEASE bring your medication bottles to each appointment. 7. Please call Cardiac Rehab at [phone] if you do not already have an appointment. 8. Please sign up for My Chart. CALL [phone] FOR ANY QUESTIONS OR PROBLEMS.     Signature:___________________________________________________

## 2020-07-29 VITALS
HEART RATE: 78 BPM | BODY MASS INDEX: 30.25 KG/M2 | HEIGHT: 72 IN | DIASTOLIC BLOOD PRESSURE: 62 MMHG | TEMPERATURE: 98.5 F | RESPIRATION RATE: 16 BRPM | SYSTOLIC BLOOD PRESSURE: 112 MMHG | WEIGHT: 223.33 LBS | OXYGEN SATURATION: 99 %

## 2020-07-29 LAB
ANION GAP SERPL CALC-SCNC: 4 MMOL/L (ref 5–15)
ATRIAL RATE: 81 BPM
BUN SERPL-MCNC: 15 MG/DL (ref 6–20)
BUN/CREAT SERPL: 14 (ref 12–20)
CALCIUM SERPL-MCNC: 8.7 MG/DL (ref 8.5–10.1)
CALCULATED P AXIS, ECG09: 66 DEGREES
CALCULATED R AXIS, ECG10: -16 DEGREES
CALCULATED R AXIS, ECG10: 8 DEGREES
CALCULATED T AXIS, ECG11: 8 DEGREES
CALCULATED T AXIS, ECG11: 81 DEGREES
CHLORIDE SERPL-SCNC: 103 MMOL/L (ref 97–108)
CO2 SERPL-SCNC: 29 MMOL/L (ref 21–32)
CREAT SERPL-MCNC: 1.05 MG/DL (ref 0.7–1.3)
DIAGNOSIS, 93000: NORMAL
DIAGNOSIS, 93000: NORMAL
ERYTHROCYTE [DISTWIDTH] IN BLOOD BY AUTOMATED COUNT: 12.9 % (ref 11.5–14.5)
GLUCOSE BLD STRIP.AUTO-MCNC: 113 MG/DL (ref 65–100)
GLUCOSE SERPL-MCNC: 106 MG/DL (ref 65–100)
HCT VFR BLD AUTO: 36.4 % (ref 36.6–50.3)
HGB BLD-MCNC: 12.2 G/DL (ref 12.1–17)
MAGNESIUM SERPL-MCNC: 2.3 MG/DL (ref 1.6–2.4)
MCH RBC QN AUTO: 30.6 PG (ref 26–34)
MCHC RBC AUTO-ENTMCNC: 33.5 G/DL (ref 30–36.5)
MCV RBC AUTO: 91.2 FL (ref 80–99)
NRBC # BLD: 0 K/UL (ref 0–0.01)
NRBC BLD-RTO: 0 PER 100 WBC
P-R INTERVAL, ECG05: 166 MS
PLATELET # BLD AUTO: 224 K/UL (ref 150–400)
PMV BLD AUTO: 10.9 FL (ref 8.9–12.9)
POTASSIUM SERPL-SCNC: 4.3 MMOL/L (ref 3.5–5.1)
Q-T INTERVAL, ECG07: 348 MS
Q-T INTERVAL, ECG07: 414 MS
QRS DURATION, ECG06: 112 MS
QRS DURATION, ECG06: 112 MS
QTC CALCULATION (BEZET), ECG08: 480 MS
QTC CALCULATION (BEZET), ECG08: 498 MS
RBC # BLD AUTO: 3.99 M/UL (ref 4.1–5.7)
SERVICE CMNT-IMP: ABNORMAL
SODIUM SERPL-SCNC: 136 MMOL/L (ref 136–145)
VENTRICULAR RATE, ECG03: 123 BPM
VENTRICULAR RATE, ECG03: 81 BPM
WBC # BLD AUTO: 10 K/UL (ref 4.1–11.1)

## 2020-07-29 PROCEDURE — 93005 ELECTROCARDIOGRAM TRACING: CPT

## 2020-07-29 PROCEDURE — 80048 BASIC METABOLIC PNL TOTAL CA: CPT

## 2020-07-29 PROCEDURE — 85027 COMPLETE CBC AUTOMATED: CPT

## 2020-07-29 PROCEDURE — 36415 COLL VENOUS BLD VENIPUNCTURE: CPT

## 2020-07-29 PROCEDURE — 74011000250 HC RX REV CODE- 250: Performed by: NURSE PRACTITIONER

## 2020-07-29 PROCEDURE — 82962 GLUCOSE BLOOD TEST: CPT

## 2020-07-29 PROCEDURE — 83735 ASSAY OF MAGNESIUM: CPT

## 2020-07-29 PROCEDURE — 74011250637 HC RX REV CODE- 250/637: Performed by: PHYSICIAN ASSISTANT

## 2020-07-29 PROCEDURE — 74011250637 HC RX REV CODE- 250/637: Performed by: NURSE PRACTITIONER

## 2020-07-29 RX ORDER — AMIODARONE HYDROCHLORIDE 200 MG/1
200 TABLET ORAL EVERY 8 HOURS
Qty: 90 TAB | Refills: 0 | Status: SHIPPED | OUTPATIENT
Start: 2020-07-29 | End: 2020-08-28

## 2020-07-29 RX ORDER — DILTIAZEM HYDROCHLORIDE 120 MG/1
120 CAPSULE, COATED, EXTENDED RELEASE ORAL DAILY
Qty: 30 CAP | Refills: 0 | Status: SHIPPED | OUTPATIENT
Start: 2020-07-29 | End: 2020-08-03 | Stop reason: SDUPTHER

## 2020-07-29 RX ORDER — DILTIAZEM HYDROCHLORIDE 120 MG/1
120 CAPSULE, COATED, EXTENDED RELEASE ORAL DAILY
Status: DISCONTINUED | OUTPATIENT
Start: 2020-07-29 | End: 2020-07-29 | Stop reason: HOSPADM

## 2020-07-29 RX ADMIN — FAMOTIDINE 20 MG: 20 TABLET, FILM COATED ORAL at 08:26

## 2020-07-29 RX ADMIN — AMIODARONE HYDROCHLORIDE 200 MG: 200 TABLET ORAL at 08:26

## 2020-07-29 RX ADMIN — ASPIRIN 81 MG CHEWABLE TABLET 81 MG: 81 TABLET CHEWABLE at 08:26

## 2020-07-29 RX ADMIN — FERROUS SULFATE TAB 325 MG (65 MG ELEMENTAL FE) 325 MG: 325 (65 FE) TAB at 08:27

## 2020-07-29 RX ADMIN — METOPROLOL TARTRATE 25 MG: 25 TABLET, FILM COATED ORAL at 08:27

## 2020-07-29 RX ADMIN — DILTIAZEM HYDROCHLORIDE 120 MG: 120 CAPSULE, COATED, EXTENDED RELEASE ORAL at 09:24

## 2020-07-29 RX ADMIN — POLYETHYLENE GLYCOL 3350 17 G: 17 POWDER, FOR SOLUTION ORAL at 08:27

## 2020-07-29 RX ADMIN — Medication 10 ML: at 06:20

## 2020-07-29 RX ADMIN — APIXABAN 5 MG: 5 TABLET, FILM COATED ORAL at 08:27

## 2020-07-29 RX ADMIN — CHLORHEXIDINE GLUCONATE 10 ML: 1.2 RINSE ORAL at 08:28

## 2020-07-29 RX ADMIN — MAGNESIUM OXIDE 400 MG (241.3 MG MAGNESIUM) TABLET 400 MG: TABLET at 08:26

## 2020-07-29 RX ADMIN — DOCUSATE SODIUM - SENNOSIDES 1 TABLET: 50; 8.6 TABLET, FILM COATED ORAL at 08:26

## 2020-07-29 NOTE — PROGRESS NOTES
0700 Bedside shift change report given to [place], RN (oncoming nurse) by Kalani Arce RN (offgoing nurse). Report included the following information SBAR, Kardex, Intake/Output, MAR, Recent Results and Cardiac Rhythm NSR.    0715 Pt sitting up in recliner at this time. VSS. Pt in NSR this AM with dilt gtt infusing at 2.5 mg/hr. 7989 Dr. Keiry Sparks and care team at bedside. Plan to start pt on PO dilt and transition off dilt gtt. Will get f/u EKG this AM as pt converted to NSR overnight. Plan to discharge pt today. 0726 Royston Hamlin stopped. Pt remains in NSR. Pt OK to d/c per CM. Will prepare pt for discharge shortly. 1150 I have reviewed discharge instructions with the patient. Time was offered for questions and clarification. The patient verbalized understanding. Pt discharged with hard copies of all prescriptions and personal belongings. IV and telemetry removed. Pt discharged to front entrance of hospital via wheelchair with PCT.

## 2020-07-29 NOTE — PROGRESS NOTES
PRO:  1) Home with [address]   2) FU appts  3) Family to transport at d/c    10:22 AM: Discharge order noted. Pt has [facility] services set up and will need to make his own fu appt with his PCP as his office requires pt's to make their own. No further needs identified at this time and pt is clear to discharge from a CM standpoint. Care Management Interventions  PCP Verified by CM: Yes  Last Visit to PCP: 10/01/19  Mode of Transport at Discharge:  Other (see comment)(family)  Transition of Care Consult (CM Consult): [address]: No  Reason Outside [place]: Unable to staff case  Discharge Durable Medical Equipment: No  Health Maintenance Reviewed: Yes  Physical Therapy Consult: Yes  Occupational Therapy Consult: Yes  Speech Therapy Consult: No  Current Support Network: Lives with Spouse, Family Lives Nearby(patient lives with spouse and son in a one level ranch home with 3 steps to enter and exit home.)  Confirm Follow Up Transport: Family  The Patient and/or Patient Representative was Provided with a Choice of Provider and Agrees with the Discharge Plan?: No  Freedom of Choice List was Provided with Basic Dialogue that Supports the Patient's Individualized Plan of Care/Goals, Treatment Preferences and Shares the Quality Data Associated with the Providers?: No  [place] Resource Information Provided?: No  Discharge Location  Discharge Placement: Home with home health    Essence Bunn RN ,[address] [facility]  [phone]

## 2020-07-29 NOTE — PROGRESS NOTES
[facility] ICU Progress Note    Admit Date: 2020  POD:  6 Day Post-Op    Procedure:  Procedure(s):  JEANIE BY DR PHILLIPS - MITRAL VALVE REPAIR  WITH RIGHT  THORACOTOMY - RIGHT GROIN CANNULATION        Subjective:   Pt seen with Dr. Flaco Mckeon    Converted to SR after Dilt infusion started about 1900 yesterday    Ambulating selfcare  Try to discharge again  Add Cardizem PO  Increase Amio back to 600 mg daily  Minimal discomfort  Instructions given     Objective:   Vitals:  Blood pressure 115/59, pulse 82, temperature 98.4 °F (36.9 °C), resp. rate 16, height 6' (1.829 m), weight 223 lb 5.2 oz (101.3 kg), SpO2 98 %. Temp (24hrs), Av.8 °F (37.1 °C), Min:97.8 °F (36.6 °C), Max:99.5 °F (37.5 °C)    EKG/Rhythm:  NSR 80s    Extubation Date / Time: 20 0757     Oxygen Therapy:  Oxygen Therapy  O2 Sat (%): 98 % (20 0712)  Pulse via Oximetry: 73 beats per minute (20 1503)  O2 Device: Room air (20 0712)  O2 Flow Rate (L/min): 2 l/min (20 1600)  FIO2 (%): 50 % (20 1917)    CXR:   CXR Results  (Last 48 hours)               20 0530  XR CHEST PORT Final result    Impression:  IMPRESSION: No significant interval change. Narrative:  Portable chest single view dated 2020       Comparison chest dated 2020       History is postop heart       A single frontal view of the chest was obtained. It is difficult to accurately   assess the size of the cardiac silhouette. The prosthetic cardiac valve is again   noted. There continues to be a somewhat well-demarcated soft tissue density in   the lateral aspect of the right mid lung. In retrospect, this finding was   present on the previous examination, however, it was somewhat less conspicuous   at that time. Admission Weight: Last Weight   Weight: 225 lb 8.5 oz (102.3 kg) Weight: 223 lb 5.2 oz (101.3 kg)     Intake / Output / Drain:  Current Shift: No intake/output data recorded.   Last 24 hrs.:     Intake/Output Summary (Last 24 hours) at 2020 0859  Last data filed at 2020 0400  Gross per 24 hour   Intake 891.33 ml   Output 1300 ml   Net -408.67 ml       EXAM:  General:   Alert, NAD                                                                                           Lungs:   Clear upper to auscultation bilaterally. Incision:  dsg cdi   Heart:  Regular rate and rhythm, S1, S2 normal, no murmur, click, rub or gallop. Abdomen:   Soft, non-tender. Bowel sounds active. No masses,  No organomegaly. Extremities:  No edema. PPP. Neurologic:  Gross motor and sensory apparatus intact. Labs:   Recent Labs     20  0725 20  0405  20  0535   WBC  --  10.0  --  9.0   HGB  --  12.2  --  12.5   HCT  --  36.4*  --  37.6   PLT  --  224  --  190   NA  --  136  --  136   K  --  4.3  --  4.2   BUN  --  15  --  12   CREA  --  1.05  --  1.08   GLU  --  106*  --  112*   GLUCPOC 113*  --    < >  --    INR  --   --   --  1.0    < > = values in this interval not displayed. Assessment:     Active Problems:    Mitral regurgitation (2020)      S/P MVR (mitral valve repair) (2020)      Overview: MITRAL VALVE REPAIR 38mm CG Future Band Via Mini Right Thoracotomy      RIGHT FEMORAL ARTERIAL AND VENOUS CANNULATION               Plan/Recommendations/Medical Decision Makin. MR s/p MVr mini right thoracotomy: On ASA  2. Atelectasis: Encourage I/S, on RA  3. Post op anemia st acute blood loss: Monitor H&H and CT output  4. Thrombocytopenia: mild, monitor plt  5. Arrhythmia: bradycardic initially post op, had afib with RVR on Saturday. Now NSR. On PO amio/Dilt. 6. Pain control: remove onq, prn tylenol and oxycodone  7. Elevated Cr: improved  8. Elevated Tbili: Tbili 1.6 preop, back to baseline    10.  Dispo: dc home     Signed By: PAYAM Cintron

## 2020-07-29 NOTE — PROGRESS NOTES
Problem: Falls - Risk of  Goal: *Absence of Falls  Description: Document Ivnorma Jay Fall Risk and appropriate interventions in the flowsheet.   Outcome: Resolved/Met     Problem: Patient Education: Go to Patient Education Activity  Goal: Patient/Family Education  Outcome: Resolved/Met     Problem: Patient Education: Go to Patient Education Activity  Goal: Patient/Family Education  Outcome: Resolved/Met     Problem: Cardiac Valve Surgery: Day of Surgery/Post-Op (Initiate SCIP measures for post-op care)  Goal: Off Pathway (Use only if patient is Off Pathway)  Outcome: Resolved/Met  Goal: Activity/Safety  Outcome: Resolved/Met  Goal: Consults, if ordered  Outcome: Resolved/Met  Goal: Diagnostic Test/Procedures  Outcome: Resolved/Met  Goal: Nutrition/Diet  Outcome: Resolved/Met  Goal: Medications  Outcome: Resolved/Met  Goal: Respiratory  Outcome: Resolved/Met  Goal: Treatments/Interventions/Procedures  Outcome: Resolved/Met  Goal: Psychosocial  Outcome: Resolved/Met  Goal: *Hemodynamically stable (eg: Cardiac output/index; pulmonary arterial pressures; mixed venous oxygen saturation within set parameters)  Outcome: Resolved/Met  Goal: *Lungs clear or at baseline (eg: ABG's; oxygen saturation; end-tidal CO2  within set parameters)  Outcome: Resolved/Met  Goal: *Valve type identified  Outcome: Resolved/Met  Goal: *Stable cardiac rhythm  Outcome: Resolved/Met  Goal: *Afebrile  Outcome: Resolved/Met  Goal: *Follows simple commands post anesthesia  Outcome: Resolved/Met  Goal: *Orients easily following extubation  Outcome: Resolved/Met  Goal: *Optimal pain control at patient's stated goal  Outcome: Resolved/Met     Problem: Cardiac Valve Surgery: Post-Op Day 1  Goal: Off Pathway (Use only if patient is Off Pathway)  Outcome: Resolved/Met  Goal: Activity/Safety  Outcome: Resolved/Met  Goal: Consults, if ordered  Outcome: Resolved/Met  Goal: Diagnostic Test/Procedures  Outcome: Resolved/Met  Goal: Nutrition/Diet  Outcome: Resolved/Met  Goal: Medications  Outcome: Resolved/Met  Goal: Respiratory  Outcome: Resolved/Met  Goal: Treatments/Interventions/Procedures  Outcome: Resolved/Met  Goal: Psychosocial  Outcome: Resolved/Met  Goal: *Hemodynamically stable without vasoactive medications  Outcome: Resolved/Met  Goal: *Stable cardiac rhythm  Outcome: Resolved/Met  Goal: *Lungs clear or at baseline  Outcome: Resolved/Met  Goal: *Mechanical ventilation discontinued  Outcome: Resolved/Met  Goal: *Optimal pain control at patient's stated goal  Outcome: Resolved/Met  Goal: *Demonstrates progressive activity  Outcome: Resolved/Met  Goal: *Tolerating diet  Outcome: Resolved/Met     Problem: Cardiac Valve Surgery: Post-Op Day 2  Goal: Off Pathway (Use only if patient is Off Pathway)  Outcome: Resolved/Met  Goal: Activity/Safety  Outcome: Resolved/Met  Goal: Consults, if ordered  Outcome: Resolved/Met  Goal: Diagnostic Test/Procedures  Outcome: Resolved/Met  Goal: Nutrition/Diet  Outcome: Resolved/Met  Goal: Discharge Planning  Outcome: Resolved/Met  Goal: Medications  Outcome: Resolved/Met  Goal: Respiratory  Outcome: Resolved/Met  Goal: Treatments/Interventions/Procedures  Outcome: Resolved/Met  Goal: Psychosocial  Outcome: Resolved/Met  Goal: *Hemodynamically stable without vasoactive medications  Outcome: Resolved/Met  Goal: *Stable cardiac rhythm  Outcome: Resolved/Met  Goal: *Lungs clear or at baseline  Outcome: Resolved/Met  Goal: *Mechanical ventilation discontinued  Outcome: Resolved/Met  Goal: *Optimal pain control at patient's stated goal  Outcome: Resolved/Met  Goal: *Demonstrates progressive activity  Outcome: Resolved/Met  Goal: *Tolerating diet  Outcome: Resolved/Met  Goal: *Incisions without signs and symptoms of wound complication  Outcome: Resolved/Met     Problem: Cardiac Valve Surgery: Post-Op Day 3  Goal: Off Pathway (Use only if patient is Off Pathway)  Outcome: Resolved/Met  Goal: Activity/Safety  Outcome: Resolved/Met  Goal: Diagnostic Test/Procedures  Outcome: Resolved/Met  Goal: Nutrition/Diet  Outcome: Resolved/Met  Goal: Discharge Planning  Outcome: Resolved/Met  Goal: Medications  Outcome: Resolved/Met  Goal: Respiratory  Outcome: Resolved/Met  Goal: Treatments/Interventions/Procedures  Outcome: Resolved/Met  Goal: Psychosocial  Outcome: Resolved/Met  Goal: *Hemodynamically stable without vasoactive medications  Outcome: Resolved/Met  Goal: *Stable cardiac rhythm  Outcome: Resolved/Met  Goal: *Lungs clear or at baseline  Outcome: Resolved/Met  Goal: *Optimal pain control at patient's stated goal  Outcome: Resolved/Met  Goal: *Incisions without signs and symptoms of wound complication  Outcome: Resolved/Met  Goal: *Demonstrates progressive activity  Outcome: Resolved/Met  Goal: *Tolerating diet  Outcome: Resolved/Met     Problem: Cardiac Valve Surgery: Post-Op Day 4  Goal: Off Pathway (Use only if patient is Off Pathway)  Outcome: Resolved/Met  Goal: Activity/Safety  Outcome: Resolved/Met  Goal: Diagnostic Test/Procedures  Outcome: Resolved/Met  Goal: Nutrition/Diet  Outcome: Resolved/Met  Goal: Discharge Planning  Outcome: Resolved/Met  Goal: Medications  Outcome: Resolved/Met  Goal: Respiratory  Outcome: Resolved/Met  Goal: Treatments/Interventions/Procedures  Outcome: Resolved/Met  Goal: Psychosocial  Outcome: Resolved/Met  Goal: *Hemodynamically stable  Outcome: Resolved/Met  Goal: *Stable cardiac rhythm  Outcome: Resolved/Met  Goal: *Lungs clear or at baseline  Outcome: Resolved/Met  Goal: *Optimal pain control at patient's stated goal  Outcome: Resolved/Met  Goal: *Incisions without signs and symptoms of wound complication  Outcome: Resolved/Met  Goal: *Ambulating and performing exercises with assistance  Outcome: Resolved/Met  Goal: *Demonstrates progressive activity  Outcome: Resolved/Met  Goal: *Tolerating diet  Outcome: Resolved/Met     Problem: Cardiac Valve Surgery: Post-Op Day 5  Goal: Off Pathway (Use only if patient is Off Pathway)  Outcome: Resolved/Met  Goal: Activity/Safety  Outcome: Resolved/Met  Goal: Diagnostic Test/Procedures  Outcome: Resolved/Met  Goal: Nutrition/Diet  Outcome: Resolved/Met  Goal: Discharge Planning  Outcome: Resolved/Met  Goal: Medications  Outcome: Resolved/Met  Goal: Respiratory  Outcome: Resolved/Met  Goal: Treatments/Interventions/Procedures  Outcome: Resolved/Met  Goal: Psychosocial  Outcome: Resolved/Met  Goal: *Hemodynamically stable  Outcome: Resolved/Met  Goal: *Stable cardiac rhythm  Outcome: Resolved/Met  Goal: *Lungs clear or at baseline  Outcome: Resolved/Met  Goal: *Optimal pain control at patient's stated goal  Outcome: Resolved/Met  Goal: *Incisions without signs and symptoms of wound complication  Outcome: Resolved/Met  Goal: *Ambulating and performing exercises with assistance  Outcome: Resolved/Met  Goal: *Demonstrates progressive activity  Outcome: Resolved/Met  Goal: *Tolerating diet  Outcome: Resolved/Met     Problem: Cardiac Valve Surgery: Post-Op Day 6  Goal: Off Pathway (Use only if patient is Off Pathway)  Outcome: Resolved/Met  Goal: Activity/Safety  Outcome: Resolved/Met  Goal: Diagnostic Test/Procedures  Outcome: Resolved/Met  Goal: Nutrition/Diet  Outcome: Resolved/Met  Goal: Discharge Planning  Outcome: Resolved/Met  Goal: Medications  Outcome: Resolved/Met  Goal: Respiratory  Outcome: Resolved/Met  Goal: Treatments/Interventions/Procedures  Outcome: Resolved/Met  Goal: Psychosocial  Outcome: Resolved/Met     Problem: Cardiac Valve Surgery: Discharge Outcomes  Goal: *Hemodynamically stable  Outcome: Resolved/Met  Goal: *Stable cardiac rhythm  Outcome: Resolved/Met  Goal: *Lungs clear or at baseline  Outcome: Resolved/Met  Goal: *Demonstrates ability to perform prescribed activity without shortness of breath or discomfort  Outcome: Resolved/Met  Goal: *Verbalizes home exercise program, activity guidelines, cardiac precautions  Outcome: Resolved/Met  Goal: *Optimal pain control at patient's stated goal  Outcome: Resolved/Met  Goal: *Verbalizes understanding and describes prescribed diet  Outcome: Resolved/Met  Goal: *Verbalizes name, dosage, time, side effects, and number of days to continue medications  Outcome: Resolved/Met  Goal: *Weight  is stable  Outcome: Resolved/Met  Goal: *No signs and symptoms of infection or wound complications  Outcome: Resolved/Met  Goal: *Anxiety reduced or absent  Outcome: Resolved/Met  Goal: *Verbalizes and demonstrates incision care  Outcome: Resolved/Met  Goal: *Understands and describes signs and symptoms to report to providers(Stroke Metric)  Outcome: Resolved/Met  Goal: *Describes follow-up/return visits to physicians  Outcome: Resolved/Met  Goal: *Describes available resources and support systems  Outcome: Resolved/Met  Goal: *Expresses feelings about diagnosis and surgery  Outcome: Resolved/Met  Goal: *Influenza immunization  Outcome: Resolved/Met  Goal: *Pneumococcal immunization  Outcome: Resolved/Met     Problem: Pressure Injury - Risk of  Goal: *Prevention of pressure injury  Description: Document Hoang Scale and appropriate interventions in the flowsheet.   Outcome: Resolved/Met     Problem: Patient Education: Go to Patient Education Activity  Goal: Patient/Family Education  Outcome: Resolved/Met     Problem: Patient Education: Go to Patient Education Activity  Goal: Patient/Family Education  Outcome: Resolved/Met     Problem: Patient Education: Go to Patient Education Activity  Goal: Patient/Family Education  Outcome: Resolved/Met

## 2020-07-31 ENCOUNTER — DOCUMENTATION ONLY (OUTPATIENT)
Dept: CASE MANAGEMENT | Age: 50
End: 2020-07-31

## 2020-07-31 NOTE — PROGRESS NOTES
Hospital follow-up call placed to pt. Left message for pt to return my call. Ryan Carlos RN    12:35  Cardiac Surgery Discharge - Mirian Miller returned my call. Reviewed plan of care after discharge and encouraged Mirian Miller to verbalize. Discussed precautions. Encouraged continued use of the incentive spirometer. He is weighing daily. Home health nurse visited yesterday and examined his incision and checked HR. Confirmed follow up appts and reinforced importance of wearing red reminder bracelet. Mirian Miller is without questions or concerns.  Sae Beltran RN

## 2020-08-03 ENCOUNTER — OFFICE VISIT (OUTPATIENT)
Dept: CARDIOTHORACIC SURGERY | Age: 50
End: 2020-08-03
Payer: COMMERCIAL

## 2020-08-03 VITALS
SYSTOLIC BLOOD PRESSURE: 122 MMHG | TEMPERATURE: 98.4 F | RESPIRATION RATE: 14 BRPM | DIASTOLIC BLOOD PRESSURE: 78 MMHG | OXYGEN SATURATION: 98 % | HEART RATE: 84 BPM

## 2020-08-03 DIAGNOSIS — I48.0 PAROXYSMAL A-FIB (HCC): Primary | ICD-10-CM

## 2020-08-03 DIAGNOSIS — Z98.890 S/P MVR (MITRAL VALVE REPAIR): Primary | ICD-10-CM

## 2020-08-03 PROCEDURE — 99024 POSTOP FOLLOW-UP VISIT: CPT | Performed by: THORACIC SURGERY (CARDIOTHORACIC VASCULAR SURGERY)

## 2020-08-03 RX ORDER — DILTIAZEM HYDROCHLORIDE 120 MG/1
120 CAPSULE, COATED, EXTENDED RELEASE ORAL DAILY
Qty: 30 CAP | Refills: 0 | Status: SHIPPED | OUTPATIENT
Start: 2020-08-03 | End: 2020-08-25 | Stop reason: SDUPTHER

## 2020-08-03 NOTE — PROGRESS NOTES
Patient: Daphnie Morrison   Age: 48 y.o. Patient Care Team:  Elsie Aguero MD as PCP - General (Family Medicine)  Elsie Agueor MD as PCP - [facility]  Oneyda Carey MD as Surgeon (General Surgery)  Anastasiya Horn MD (Cardiology)  Roosevelt Cha MD (Cardiothoracic Surgery)    Diagnosis: The encounter diagnosis was S/P MVR (mitral valve repair). Problem List:   Patient Active Problem List   Diagnosis Code    Bilateral carotid artery stenosis I65.23    Mitral regurgitation I34.0    S/P MVR (mitral valve repair) Z98.890        Date of Surgery: 7/23/20    Surgery: MVr, mini thoracotomy    HPI:  Pt is here for post op follow up. He is ambulating well without assistance. He has a good appetite. Has had some constipation but is taking stool softener with some relief. Yesterday he had a prolonged period of dizziness and feeling flushed. He checked his HR and it was 100-110, felt like afib, finally felt better in the afternoon. Feels well today. Current Medications:   Current Outpatient Medications   Medication Sig Dispense Refill    amiodarone (CORDARONE) 200 mg tablet Take 1 Tab by mouth every eight (8) hours for 30 days. 90 Tab 0    aspirin 81 mg chewable tablet Take 1 Tab by mouth daily. 30 Tab 11    metoprolol tartrate (LOPRESSOR) 25 mg tablet Take 1 Tab by mouth two (2) times a day for 90 days. 60 Tab 2    apixaban (ELIQUIS) 5 mg tablet Take 1 Tab by mouth two (2) times a day for 30 days. 60 Tab 0    dilTIAZem ER (CARDIZEM CD) 120 mg capsule Take 1 Cap by mouth daily for 30 days. 30 Cap 0    senna-docusate (PERICOLACE) 8.6-50 mg per tablet Take 1 Tab by mouth two (2) times a day for 30 days. 60 Tab 0    multivit-minerals/FA/lycopene (ONE-A-DAY MEN'S PO) Take  by mouth daily. Vitals: Blood pressure 122/78, pulse 84, temperature 98.4 °F (36.9 °C), resp. rate 14, SpO2 98 %. Allergies: has No Known Allergies.     Physical Exam:  Wounds: clean, dry, no drainage    Lungs: clear to auscultation bilaterally    Heart: regular rate and rhythm, S1, S2 normal, no murmur, click, rub or gallop    Extremities: normal strength, tone, and muscle mass    Assessment/Plan:   1. MR s/p MVr mini right thoracotomy: On ASA  2. Atelectasis: Encourage I/S  3. PAF: heart rate regular today. On PO amio, dilt, eliquis. Felt like afib yesterday per patient report. 4. Dispo: RTC in 2-3 weeks. Pt is ready to start cardiac rehab.      Rehab - y  Walking: y  Glucometer: n/a  Antibiotic card for valves: y

## 2020-08-13 ENCOUNTER — HOSPITAL ENCOUNTER (OUTPATIENT)
Dept: CARDIAC REHAB | Age: 50
Discharge: HOME | End: 2020-08-13
Payer: COMMERCIAL

## 2020-08-13 VITALS — WEIGHT: 221.4 LBS | BODY MASS INDEX: 29.99 KG/M2 | HEIGHT: 72 IN

## 2020-08-13 PROCEDURE — 93798 PHYS/QHP OP CAR RHAB W/ECG: CPT

## 2020-08-13 PROCEDURE — 93797 PHYS/QHP OP CAR RHAB WO ECG: CPT

## 2020-08-17 ENCOUNTER — HOSPITAL ENCOUNTER (OUTPATIENT)
Dept: CARDIAC REHAB | Age: 50
Discharge: HOME | End: 2020-08-17
Payer: COMMERCIAL

## 2020-08-17 VITALS — BODY MASS INDEX: 30.24 KG/M2 | WEIGHT: 223 LBS

## 2020-08-17 PROCEDURE — 93798 PHYS/QHP OP CAR RHAB W/ECG: CPT

## 2020-08-19 ENCOUNTER — HOSPITAL ENCOUNTER (OUTPATIENT)
Dept: CARDIAC REHAB | Age: 50
Discharge: HOME | End: 2020-08-19
Payer: COMMERCIAL

## 2020-08-19 VITALS — WEIGHT: 221 LBS | BODY MASS INDEX: 29.97 KG/M2

## 2020-08-19 PROCEDURE — 93798 PHYS/QHP OP CAR RHAB W/ECG: CPT

## 2020-08-25 ENCOUNTER — OFFICE VISIT (OUTPATIENT)
Dept: CARDIOTHORACIC SURGERY | Age: 50
End: 2020-08-25
Payer: COMMERCIAL

## 2020-08-25 VITALS
OXYGEN SATURATION: 97 % | TEMPERATURE: 98 F | RESPIRATION RATE: 14 BRPM | DIASTOLIC BLOOD PRESSURE: 76 MMHG | HEART RATE: 76 BPM | SYSTOLIC BLOOD PRESSURE: 118 MMHG

## 2020-08-25 DIAGNOSIS — Z98.890 S/P MVR (MITRAL VALVE REPAIR): Primary | ICD-10-CM

## 2020-08-25 DIAGNOSIS — I48.0 PAROXYSMAL A-FIB (HCC): ICD-10-CM

## 2020-08-25 PROCEDURE — 99024 POSTOP FOLLOW-UP VISIT: CPT | Performed by: NURSE PRACTITIONER

## 2020-08-25 RX ORDER — DILTIAZEM HYDROCHLORIDE 120 MG/1
120 CAPSULE, COATED, EXTENDED RELEASE ORAL DAILY
Qty: 30 CAP | Refills: 0 | Status: SHIPPED | OUTPATIENT
Start: 2020-08-25 | End: 2020-09-24

## 2020-08-25 NOTE — PROGRESS NOTES
Patient: Courtney Hutton   Age: 48 y.o. Patient Care Team:  Alexy Hill MD as PCP - General (Family Medicine)  Alexy Hill MD as PCP - [facility]  Marty Galicia MD as Surgeon (General Surgery)  Dodie Olivas MD (Cardiology)  Tobie Meigs, MD (Cardiothoracic Surgery)    Diagnosis: The encounter diagnosis was S/P MVR (mitral valve repair). Problem List:   Patient Active Problem List   Diagnosis Code    Bilateral carotid artery stenosis I65.23    Mitral regurgitation I34.0    S/P MVR (mitral valve repair) Z98.890        Date of Surgery: 7/23/20     Surgery: MVr, mini thoracotomy    HPI:  Pt is here for post op follow up, seen with Dr. Adelaida Hutchins. Pt is ambulating well without assistance. He denies SOB, weight gain, and edema. He denies fatigue. He has not felt his heart racing recently. He has been active. Current Medications:   Current Outpatient Medications   Medication Sig Dispense Refill    apixaban (ELIQUIS) 5 mg tablet Take 1 Tab by mouth two (2) times a day for 30 days. 60 Tab 0    amiodarone (CORDARONE) 200 mg tablet Take 1 Tab by mouth every eight (8) hours for 30 days. 90 Tab 0    aspirin 81 mg chewable tablet Take 1 Tab by mouth daily. 30 Tab 11    metoprolol tartrate (LOPRESSOR) 25 mg tablet Take 1 Tab by mouth two (2) times a day for 90 days. 60 Tab 2    dilTIAZem ER (CARDIZEM CD) 120 mg capsule Take 1 Cap by mouth daily for 30 days. 30 Cap 0    senna-docusate (PERICOLACE) 8.6-50 mg per tablet Take 1 Tab by mouth two (2) times a day for 30 days. 60 Tab 0    multivit-minerals/FA/lycopene (ONE-A-DAY MEN'S PO) Take  by mouth daily. Vitals: Blood pressure 118/76, pulse 76, temperature 98 °F (36.7 °C), resp. rate 14, SpO2 97 %. Allergies: has No Known Allergies.     Physical Exam:  Wounds: clean, dry, no drainage    Lungs: clear to auscultation bilaterally    Heart: regular rate and rhythm, S1, S2 normal, no murmur, click, rub or gallop    Extremities: normal strength, tone, and muscle mass    Assessment/Plan:   1. MR s/p MVr mini right thoracotomy: On ASA  2. Atelectasis: Encourage I/S  3. PAF: on amio, dilt, metop, and eliquis. He has been in NSR in all cardiac rehab telemetry. HR regular today in clinic. 4. Dispo: FU with cardiology and PCP    Pt is ready to start cardiac rehab.      Rehab - y  Walking: y  Glucometer: n/a  Antibiotic card for valves: y

## 2020-08-26 ENCOUNTER — HOSPITAL ENCOUNTER (OUTPATIENT)
Dept: CARDIAC REHAB | Age: 50
Discharge: HOME | End: 2020-08-26
Payer: COMMERCIAL

## 2020-08-26 VITALS — BODY MASS INDEX: 29.97 KG/M2 | WEIGHT: 221 LBS

## 2020-08-26 PROCEDURE — 93797 PHYS/QHP OP CAR RHAB WO ECG: CPT | Performed by: DIETITIAN, REGISTERED

## 2020-08-26 PROCEDURE — 93798 PHYS/QHP OP CAR RHAB W/ECG: CPT

## 2020-08-28 ENCOUNTER — HOSPITAL ENCOUNTER (OUTPATIENT)
Dept: CARDIAC REHAB | Age: 50
Discharge: HOME | End: 2020-08-28
Payer: COMMERCIAL

## 2020-08-28 VITALS — BODY MASS INDEX: 29.97 KG/M2 | WEIGHT: 221 LBS

## 2020-08-28 PROCEDURE — 93798 PHYS/QHP OP CAR RHAB W/ECG: CPT

## 2020-08-31 ENCOUNTER — HOSPITAL ENCOUNTER (OUTPATIENT)
Dept: CARDIAC REHAB | Age: 50
Discharge: HOME | End: 2020-08-31
Payer: COMMERCIAL

## 2020-08-31 VITALS — WEIGHT: 223 LBS | BODY MASS INDEX: 30.24 KG/M2

## 2020-08-31 PROCEDURE — 93798 PHYS/QHP OP CAR RHAB W/ECG: CPT

## 2020-09-02 ENCOUNTER — HOSPITAL ENCOUNTER (OUTPATIENT)
Dept: CARDIAC REHAB | Age: 50
Discharge: HOME | End: 2020-09-02
Payer: COMMERCIAL

## 2020-09-02 VITALS — BODY MASS INDEX: 30.38 KG/M2 | WEIGHT: 224 LBS

## 2020-09-02 PROCEDURE — 93798 PHYS/QHP OP CAR RHAB W/ECG: CPT

## 2020-09-02 PROCEDURE — 93797 PHYS/QHP OP CAR RHAB WO ECG: CPT | Performed by: DIETITIAN, REGISTERED

## 2020-09-04 ENCOUNTER — APPOINTMENT (OUTPATIENT)
Dept: CARDIAC REHAB | Age: 50
End: 2020-09-04
Payer: COMMERCIAL

## 2020-09-09 ENCOUNTER — HOSPITAL ENCOUNTER (OUTPATIENT)
Dept: CARDIAC REHAB | Age: 50
Discharge: HOME | End: 2020-09-09
Payer: COMMERCIAL

## 2020-09-09 VITALS — WEIGHT: 222 LBS | BODY MASS INDEX: 30.11 KG/M2

## 2020-09-09 PROCEDURE — 93797 PHYS/QHP OP CAR RHAB WO ECG: CPT

## 2020-09-09 PROCEDURE — 93798 PHYS/QHP OP CAR RHAB W/ECG: CPT

## 2020-09-11 ENCOUNTER — HOSPITAL ENCOUNTER (OUTPATIENT)
Dept: CARDIAC REHAB | Age: 50
Discharge: HOME | End: 2020-09-11
Payer: COMMERCIAL

## 2020-09-11 VITALS — WEIGHT: 223 LBS | BODY MASS INDEX: 30.24 KG/M2

## 2020-09-11 PROCEDURE — 93798 PHYS/QHP OP CAR RHAB W/ECG: CPT

## 2020-09-14 ENCOUNTER — HOSPITAL ENCOUNTER (OUTPATIENT)
Dept: CARDIAC REHAB | Age: 50
Discharge: HOME | End: 2020-09-14
Payer: COMMERCIAL

## 2020-09-14 VITALS — WEIGHT: 223 LBS | BODY MASS INDEX: 30.24 KG/M2

## 2020-09-14 PROCEDURE — 93798 PHYS/QHP OP CAR RHAB W/ECG: CPT

## 2020-09-16 ENCOUNTER — HOSPITAL ENCOUNTER (OUTPATIENT)
Dept: CARDIAC REHAB | Age: 50
Discharge: HOME | End: 2020-09-16
Payer: COMMERCIAL

## 2020-09-16 VITALS — WEIGHT: 223 LBS | BODY MASS INDEX: 30.24 KG/M2

## 2020-09-16 PROCEDURE — 93798 PHYS/QHP OP CAR RHAB W/ECG: CPT

## 2020-09-16 PROCEDURE — 93797 PHYS/QHP OP CAR RHAB WO ECG: CPT

## 2020-09-21 ENCOUNTER — HOSPITAL ENCOUNTER (OUTPATIENT)
Dept: CARDIAC REHAB | Age: 50
Discharge: HOME | End: 2020-09-21
Payer: COMMERCIAL

## 2020-09-21 VITALS — WEIGHT: 223 LBS | BODY MASS INDEX: 30.24 KG/M2

## 2020-09-21 PROCEDURE — 93798 PHYS/QHP OP CAR RHAB W/ECG: CPT

## 2020-09-30 ENCOUNTER — HOSPITAL ENCOUNTER (OUTPATIENT)
Dept: CARDIAC REHAB | Age: 50
Discharge: HOME | End: 2020-09-30
Payer: COMMERCIAL

## 2020-09-30 VITALS — BODY MASS INDEX: 30.24 KG/M2 | WEIGHT: 223 LBS

## 2020-09-30 PROCEDURE — 93798 PHYS/QHP OP CAR RHAB W/ECG: CPT

## 2020-10-05 ENCOUNTER — HOSPITAL ENCOUNTER (OUTPATIENT)
Dept: CARDIAC REHAB | Age: 50
Discharge: HOME | End: 2020-10-05
Payer: COMMERCIAL

## 2020-10-05 VITALS — BODY MASS INDEX: 30.38 KG/M2 | WEIGHT: 224 LBS

## 2020-10-05 PROCEDURE — 93798 PHYS/QHP OP CAR RHAB W/ECG: CPT

## 2020-10-07 ENCOUNTER — HOSPITAL ENCOUNTER (OUTPATIENT)
Dept: CARDIAC REHAB | Age: 50
Discharge: HOME | End: 2020-10-07
Payer: COMMERCIAL

## 2020-10-07 VITALS — WEIGHT: 224 LBS | BODY MASS INDEX: 30.38 KG/M2

## 2020-10-07 PROCEDURE — 93798 PHYS/QHP OP CAR RHAB W/ECG: CPT

## 2021-07-09 ENCOUNTER — TRANSCRIBE ORDER (OUTPATIENT)
Dept: SCHEDULING | Age: 51
End: 2021-07-09

## 2021-07-09 DIAGNOSIS — M79.605 LEFT LEG PAIN: Primary | ICD-10-CM

## 2021-07-12 ENCOUNTER — HOSPITAL ENCOUNTER (OUTPATIENT)
Dept: ULTRASOUND IMAGING | Age: 51
Discharge: HOME | End: 2021-07-12
Attending: STUDENT IN AN ORGANIZED HEALTH CARE EDUCATION/TRAINING PROGRAM
Payer: COMMERCIAL

## 2021-07-12 DIAGNOSIS — M79.605 LEFT LEG PAIN: ICD-10-CM

## 2021-07-12 PROCEDURE — 93971 EXTREMITY STUDY: CPT

## 2021-10-02 ENCOUNTER — HOSPITAL ENCOUNTER (INPATIENT)
Age: 51
LOS: 3 days | Discharge: HOME | DRG: 310 | End: 2021-10-05
Attending: EMERGENCY MEDICINE | Admitting: STUDENT IN AN ORGANIZED HEALTH CARE EDUCATION/TRAINING PROGRAM
Payer: COMMERCIAL

## 2021-10-02 ENCOUNTER — APPOINTMENT (OUTPATIENT)
Dept: NON INVASIVE DIAGNOSTICS | Age: 51
DRG: 310 | End: 2021-10-02
Attending: HOSPITALIST
Payer: COMMERCIAL

## 2021-10-02 ENCOUNTER — APPOINTMENT (OUTPATIENT)
Dept: GENERAL RADIOLOGY | Age: 51
DRG: 310 | End: 2021-10-02
Attending: EMERGENCY MEDICINE
Payer: COMMERCIAL

## 2021-10-02 ENCOUNTER — APPOINTMENT (OUTPATIENT)
Dept: CT IMAGING | Age: 51
DRG: 310 | End: 2021-10-02
Attending: EMERGENCY MEDICINE
Payer: COMMERCIAL

## 2021-10-02 DIAGNOSIS — I48.91 ATRIAL FIBRILLATION WITH RAPID VENTRICULAR RESPONSE (HCC): Primary | ICD-10-CM

## 2021-10-02 DIAGNOSIS — R55 SYNCOPE AND COLLAPSE: ICD-10-CM

## 2021-10-02 DIAGNOSIS — R55 VASO VAGAL EPISODE: ICD-10-CM

## 2021-10-02 DIAGNOSIS — R07.9 ACUTE CHEST PAIN: ICD-10-CM

## 2021-10-02 PROBLEM — R06.00 DYSPNEA: Status: ACTIVE | Noted: 2021-10-02

## 2021-10-02 PROBLEM — J90 PLEURAL EFFUSION: Status: ACTIVE | Noted: 2021-10-02

## 2021-10-02 LAB
ALBUMIN SERPL-MCNC: 3.1 G/DL (ref 3.5–5)
ALBUMIN/GLOB SERPL: 0.7 {RATIO} (ref 1.1–2.2)
ALP SERPL-CCNC: 83 U/L (ref 45–117)
ALT SERPL-CCNC: 25 U/L (ref 12–78)
ANION GAP SERPL CALC-SCNC: 4 MMOL/L (ref 5–15)
AST SERPL-CCNC: 17 U/L (ref 15–37)
BASOPHILS # BLD: 0.1 K/UL (ref 0–0.1)
BASOPHILS NFR BLD: 1 % (ref 0–1)
BILIRUB SERPL-MCNC: 1.3 MG/DL (ref 0.2–1)
BNP SERPL-MCNC: 379 PG/ML
BUN SERPL-MCNC: 14 MG/DL (ref 6–20)
BUN/CREAT SERPL: 12 (ref 12–20)
CALCIUM SERPL-MCNC: 9 MG/DL (ref 8.5–10.1)
CHLORIDE SERPL-SCNC: 105 MMOL/L (ref 97–108)
CO2 SERPL-SCNC: 28 MMOL/L (ref 21–32)
COMMENT, HOLDF: NORMAL
COVID-19 RAPID TEST, COVR: NOT DETECTED
CREAT SERPL-MCNC: 1.19 MG/DL (ref 0.7–1.3)
DIFFERENTIAL METHOD BLD: ABNORMAL
EOSINOPHIL # BLD: 0.2 K/UL (ref 0–0.4)
EOSINOPHIL NFR BLD: 2 % (ref 0–7)
ERYTHROCYTE [DISTWIDTH] IN BLOOD BY AUTOMATED COUNT: 12.5 % (ref 11.5–14.5)
GLOBULIN SER CALC-MCNC: 4.2 G/DL (ref 2–4)
GLUCOSE BLD STRIP.AUTO-MCNC: 130 MG/DL (ref 65–117)
GLUCOSE SERPL-MCNC: 130 MG/DL (ref 65–100)
HCT VFR BLD AUTO: 46.3 % (ref 36.6–50.3)
HGB BLD-MCNC: 16.2 G/DL (ref 12.1–17)
IMM GRANULOCYTES # BLD AUTO: 0.1 K/UL (ref 0–0.04)
IMM GRANULOCYTES NFR BLD AUTO: 1 % (ref 0–0.5)
LYMPHOCYTES # BLD: 2.3 K/UL (ref 0.8–3.5)
LYMPHOCYTES NFR BLD: 24 % (ref 12–49)
MAGNESIUM SERPL-MCNC: 2.1 MG/DL (ref 1.6–2.4)
MCH RBC QN AUTO: 31.3 PG (ref 26–34)
MCHC RBC AUTO-ENTMCNC: 35 G/DL (ref 30–36.5)
MCV RBC AUTO: 89.6 FL (ref 80–99)
MONOCYTES # BLD: 0.7 K/UL (ref 0–1)
MONOCYTES NFR BLD: 7 % (ref 5–13)
NEUTS SEG # BLD: 6.3 K/UL (ref 1.8–8)
NEUTS SEG NFR BLD: 65 % (ref 32–75)
NRBC # BLD: 0 K/UL (ref 0–0.01)
NRBC BLD-RTO: 0 PER 100 WBC
PLATELET # BLD AUTO: 251 K/UL (ref 150–400)
PMV BLD AUTO: 10 FL (ref 8.9–12.9)
POTASSIUM SERPL-SCNC: 4 MMOL/L (ref 3.5–5.1)
PROT SERPL-MCNC: 7.3 G/DL (ref 6.4–8.2)
RBC # BLD AUTO: 5.17 M/UL (ref 4.1–5.7)
SAMPLES BEING HELD,HOLD: NORMAL
SERVICE CMNT-IMP: ABNORMAL
SODIUM SERPL-SCNC: 137 MMOL/L (ref 136–145)
SOURCE, COVRS: NORMAL
TROPONIN I BLD-MCNC: <0.04 NG/ML (ref 0–0.08)
TROPONIN I SERPL-MCNC: <0.05 NG/ML
WBC # BLD AUTO: 9.6 K/UL (ref 4.1–11.1)

## 2021-10-02 PROCEDURE — 83735 ASSAY OF MAGNESIUM: CPT

## 2021-10-02 PROCEDURE — 74011250637 HC RX REV CODE- 250/637: Performed by: HOSPITALIST

## 2021-10-02 PROCEDURE — 71275 CT ANGIOGRAPHY CHEST: CPT

## 2021-10-02 PROCEDURE — 36415 COLL VENOUS BLD VENIPUNCTURE: CPT

## 2021-10-02 PROCEDURE — 99285 EMERGENCY DEPT VISIT HI MDM: CPT

## 2021-10-02 PROCEDURE — 71045 X-RAY EXAM CHEST 1 VIEW: CPT

## 2021-10-02 PROCEDURE — 80053 COMPREHEN METABOLIC PANEL: CPT

## 2021-10-02 PROCEDURE — 93005 ELECTROCARDIOGRAM TRACING: CPT

## 2021-10-02 PROCEDURE — 74011250637 HC RX REV CODE- 250/637: Performed by: EMERGENCY MEDICINE

## 2021-10-02 PROCEDURE — 74011250636 HC RX REV CODE- 250/636: Performed by: EMERGENCY MEDICINE

## 2021-10-02 PROCEDURE — 96374 THER/PROPH/DIAG INJ IV PUSH: CPT

## 2021-10-02 PROCEDURE — 96372 THER/PROPH/DIAG INJ SC/IM: CPT

## 2021-10-02 PROCEDURE — 74011250636 HC RX REV CODE- 250/636: Performed by: HOSPITALIST

## 2021-10-02 PROCEDURE — 84484 ASSAY OF TROPONIN QUANT: CPT

## 2021-10-02 PROCEDURE — 96375 TX/PRO/DX INJ NEW DRUG ADDON: CPT

## 2021-10-02 PROCEDURE — 74011000636 HC RX REV CODE- 636: Performed by: EMERGENCY MEDICINE

## 2021-10-02 PROCEDURE — 99218 HC RM OBSERVATION: CPT

## 2021-10-02 PROCEDURE — 82962 GLUCOSE BLOOD TEST: CPT

## 2021-10-02 PROCEDURE — 93306 TTE W/DOPPLER COMPLETE: CPT

## 2021-10-02 PROCEDURE — 65270000029 HC RM PRIVATE

## 2021-10-02 PROCEDURE — 87635 SARS-COV-2 COVID-19 AMP PRB: CPT

## 2021-10-02 PROCEDURE — 83880 ASSAY OF NATRIURETIC PEPTIDE: CPT

## 2021-10-02 PROCEDURE — 85025 COMPLETE CBC W/AUTO DIFF WBC: CPT

## 2021-10-02 RX ORDER — ASPIRIN 81 MG/1
81 TABLET ORAL DAILY
Status: DISCONTINUED | OUTPATIENT
Start: 2021-10-03 | End: 2021-10-05 | Stop reason: HOSPADM

## 2021-10-02 RX ORDER — ENOXAPARIN SODIUM 100 MG/ML
1 INJECTION SUBCUTANEOUS
Status: COMPLETED | OUTPATIENT
Start: 2021-10-02 | End: 2021-10-02

## 2021-10-02 RX ORDER — NITROGLYCERIN 0.4 MG/1
0.4 TABLET SUBLINGUAL
Status: DISCONTINUED | OUTPATIENT
Start: 2021-10-02 | End: 2021-10-02

## 2021-10-02 RX ORDER — FENTANYL CITRATE 50 UG/ML
50 INJECTION, SOLUTION INTRAMUSCULAR; INTRAVENOUS
Status: COMPLETED | OUTPATIENT
Start: 2021-10-02 | End: 2021-10-02

## 2021-10-02 RX ORDER — ONDANSETRON 4 MG/1
4 TABLET, ORALLY DISINTEGRATING ORAL
Status: DISCONTINUED | OUTPATIENT
Start: 2021-10-02 | End: 2021-10-05 | Stop reason: HOSPADM

## 2021-10-02 RX ORDER — KETOROLAC TROMETHAMINE 30 MG/ML
30 INJECTION, SOLUTION INTRAMUSCULAR; INTRAVENOUS
Status: DISCONTINUED | OUTPATIENT
Start: 2021-10-02 | End: 2021-10-05 | Stop reason: HOSPADM

## 2021-10-02 RX ORDER — ATROPINE SULFATE 0.1 MG/ML
INJECTION INTRAVENOUS
Status: DISCONTINUED
Start: 2021-10-02 | End: 2021-10-02

## 2021-10-02 RX ORDER — SODIUM CHLORIDE 0.9 % (FLUSH) 0.9 %
5-40 SYRINGE (ML) INJECTION AS NEEDED
Status: DISCONTINUED | OUTPATIENT
Start: 2021-10-02 | End: 2021-10-05 | Stop reason: HOSPADM

## 2021-10-02 RX ORDER — ASPIRIN 81 MG/1
81 TABLET ORAL DAILY
COMMUNITY

## 2021-10-02 RX ORDER — ONDANSETRON 2 MG/ML
4 INJECTION INTRAMUSCULAR; INTRAVENOUS
Status: DISCONTINUED | OUTPATIENT
Start: 2021-10-02 | End: 2021-10-05 | Stop reason: HOSPADM

## 2021-10-02 RX ORDER — ACETAMINOPHEN 325 MG/1
650 TABLET ORAL
Status: DISCONTINUED | OUTPATIENT
Start: 2021-10-02 | End: 2021-10-02

## 2021-10-02 RX ORDER — DILTIAZEM HYDROCHLORIDE 5 MG/ML
10 INJECTION INTRAVENOUS
Status: DISCONTINUED | OUTPATIENT
Start: 2021-10-02 | End: 2021-10-02

## 2021-10-02 RX ORDER — FENTANYL CITRATE 50 UG/ML
25 INJECTION, SOLUTION INTRAMUSCULAR; INTRAVENOUS
Status: DISCONTINUED | OUTPATIENT
Start: 2021-10-02 | End: 2021-10-05 | Stop reason: HOSPADM

## 2021-10-02 RX ORDER — ACETAMINOPHEN 650 MG/1
650 SUPPOSITORY RECTAL
Status: DISCONTINUED | OUTPATIENT
Start: 2021-10-02 | End: 2021-10-05 | Stop reason: HOSPADM

## 2021-10-02 RX ORDER — METOPROLOL TARTRATE 25 MG/1
25 TABLET, FILM COATED ORAL 2 TIMES DAILY
Status: DISCONTINUED | OUTPATIENT
Start: 2021-10-02 | End: 2021-10-02

## 2021-10-02 RX ORDER — GUAIFENESIN 100 MG/5ML
325 LIQUID (ML) ORAL
Status: COMPLETED | OUTPATIENT
Start: 2021-10-02 | End: 2021-10-02

## 2021-10-02 RX ORDER — DILTIAZEM HYDROCHLORIDE 5 MG/ML
20 INJECTION INTRAVENOUS
Status: DISCONTINUED | OUTPATIENT
Start: 2021-10-02 | End: 2021-10-02

## 2021-10-02 RX ORDER — ACETAMINOPHEN 325 MG/1
650 TABLET ORAL
Status: DISCONTINUED | OUTPATIENT
Start: 2021-10-02 | End: 2021-10-05 | Stop reason: HOSPADM

## 2021-10-02 RX ORDER — SODIUM CHLORIDE 0.9 % (FLUSH) 0.9 %
5-40 SYRINGE (ML) INJECTION EVERY 8 HOURS
Status: DISCONTINUED | OUTPATIENT
Start: 2021-10-02 | End: 2021-10-05 | Stop reason: HOSPADM

## 2021-10-02 RX ORDER — NALOXONE HYDROCHLORIDE 0.4 MG/ML
0.4 INJECTION, SOLUTION INTRAMUSCULAR; INTRAVENOUS; SUBCUTANEOUS AS NEEDED
Status: DISCONTINUED | OUTPATIENT
Start: 2021-10-02 | End: 2021-10-05 | Stop reason: HOSPADM

## 2021-10-02 RX ORDER — ENOXAPARIN SODIUM 100 MG/ML
1 INJECTION SUBCUTANEOUS EVERY 12 HOURS
Status: DISCONTINUED | OUTPATIENT
Start: 2021-10-02 | End: 2021-10-03

## 2021-10-02 RX ORDER — METOPROLOL TARTRATE 25 MG/1
25 TABLET, FILM COATED ORAL EVERY 12 HOURS
Status: DISCONTINUED | OUTPATIENT
Start: 2021-10-02 | End: 2021-10-04

## 2021-10-02 RX ORDER — POLYETHYLENE GLYCOL 3350 17 G/17G
17 POWDER, FOR SOLUTION ORAL DAILY PRN
Status: DISCONTINUED | OUTPATIENT
Start: 2021-10-02 | End: 2021-10-05 | Stop reason: HOSPADM

## 2021-10-02 RX ADMIN — Medication 10 ML: at 15:47

## 2021-10-02 RX ADMIN — METOPROLOL TARTRATE 25 MG: 25 TABLET, FILM COATED ORAL at 22:57

## 2021-10-02 RX ADMIN — SODIUM CHLORIDE 1000 ML: 9 INJECTION, SOLUTION INTRAVENOUS at 07:29

## 2021-10-02 RX ADMIN — Medication 10 ML: at 22:57

## 2021-10-02 RX ADMIN — FENTANYL CITRATE 25 MCG: 50 INJECTION, SOLUTION INTRAMUSCULAR; INTRAVENOUS at 15:44

## 2021-10-02 RX ADMIN — ASPIRIN 324 MG: 81 TABLET, CHEWABLE ORAL at 07:25

## 2021-10-02 RX ADMIN — IOPAMIDOL 100 ML: 755 INJECTION, SOLUTION INTRAVENOUS at 08:51

## 2021-10-02 RX ADMIN — FENTANYL CITRATE 50 MCG: 50 INJECTION INTRAMUSCULAR; INTRAVENOUS at 09:06

## 2021-10-02 RX ADMIN — AMIODARONE HYDROCHLORIDE 150 MG: 1.5 INJECTION, SOLUTION INTRAVENOUS at 07:35

## 2021-10-02 RX ADMIN — ENOXAPARIN SODIUM 100 MG: 100 INJECTION SUBCUTANEOUS at 09:06

## 2021-10-02 RX ADMIN — ENOXAPARIN SODIUM 100 MG: 100 INJECTION SUBCUTANEOUS at 22:57

## 2021-10-02 RX ADMIN — KETOROLAC TROMETHAMINE 30 MG: 30 INJECTION, SOLUTION INTRAMUSCULAR; INTRAVENOUS at 16:21

## 2021-10-02 NOTE — PROGRESS NOTES
Problem: Falls - Risk of  Goal: *Absence of Falls  Description: Document Marianna Mak Fall Risk and appropriate interventions in the flowsheet.   Outcome: Progressing Towards Goal  Note: Fall Risk Interventions:

## 2021-10-02 NOTE — PROGRESS NOTES
Transition of Care Plan:    RUR: OBS. Observation notice provided in writing to patient and/or caregiver as well as verbal explanation of the policy. Patients who are in outpatient status also receive the Observation notice. Patient has received notice and or patient representative has received via secure email, fax, or certified mail based on patient representative's preference. Disposition: Plan is Home with Spouse once stable. Follow up appointments: PCP  DME needed: tbd  Transportation at Discharge: Spouse can transport   Everlyn Deck or means to access home:      Spouse  IM Medicare Letter: n/a OBS  Is patient a BCPI-A Bundle:     n/a      If yes, was Bundle Letter given?:     Caregiver Contact: Wife: Donna Richi: cell: [phone] home: [phone]  Discharge Caregiver contacted prior to discharge? Unit CM to contact family prior to DC. Reason for Admission:  Pt was admitted 10/2/21 d/t chest pain. AFIB with RVR. Small R PE.                       RUR Score:          OBS           Plan for utilizing home health:      TBD    PCP: First and Last name:  Neno An MD     Name of Practice: [facility]   Are you a current patient: Yes/No: yes   Approximate date of last visit: 2 months   Can you participate in a virtual visit with your PCP: yes    Cardiology: Jose Juan. Last seen about a year ago. Advance Care Planning     General Advance Care Planning (ACP) Conversation      Date of Conversation: 10/2/2021  Conducted with: Patient with Decision Making Capacity    Healthcare Decision Maker: Wife: Donna Richi: cell: [phone] home: [phone]    Content/Action Overview:   DECLINED ACP conversation - will revisit periodically   Reviewed DNR/DNI and patient elects Full Code (Attempt Resuscitation)     Length of Voluntary ACP Conversation in minutes:  <16 minutes (Non-Billable)    Urszula Aranda  Pt is alert and oriented.    Lives with wife and 11 y/o son in one story home with 3 VITOR. Works for [employer]. I W ADLs and IADLs. Drives. No DME  Used [facility] in the past.   No rehab experience. Pharmacy [facility] on [address]. Pt has prescription drug coverage. Wife can transport Pt home in car. CM will continue to monitor discharge plan. Care Management Interventions  PCP Verified by CM: Yes  Palliative Care Criteria Met (RRAT>21 & CHF Dx)?: No  Mode of Transport at Discharge:  Other (see comment)  Transition of Care Consult (CM Consult): Discharge Planning  MyChart Signup: No  Discharge Durable Medical Equipment: No  Physical Therapy Consult: No  Occupational Therapy Consult: No  Speech Therapy Consult: No  Support Systems: Spouse/Significant Other, Child(tod), Friend/Neighbor  Confirm Follow Up Transport: Self  Discharge Location  Discharge Placement: Home with family assistance    Dariusz, Weekend CM  Ext [phone]

## 2021-10-02 NOTE — ED NOTES
Patient is being transferred to [address], Room # 2158. Report given to Meghann Montano on [facility] for routine progression of care. Report consisted of the following information SBAR. Patient transferred to receiving unit by: this RN (RN or tech name). Outstanding consults needed: No     Next labs due: Yes    The following personal items will be sent with the patient during transfer to the floor: All valuables:    Cardiac monitoring ordered: Yes    The following CURRENT information was reported to the receiving RN:    Code status: Full Code at time of transfer    Last set of vital signs:  Vital Signs  Level of Consciousness: Alert (0) (10/02/21 0740)  Temp: 98.7 °F (37.1 °C) (10/02/21 0700)  Temp Source: Oral (10/02/21 0700)  Pulse (Heart Rate): (!) 103 (10/02/21 1400)  Heart Rate Source: Monitor (10/02/21 0700)  Cardiac Rhythm: Atrial Fib (Afib RVR) (10/02/21 0746)  Resp Rate: 21 (10/02/21 1400)  BP: 119/72 (10/02/21 1400)  MAP (Monitor): 83 (10/02/21 1400)  MAP (Calculated): 88 (10/02/21 1400)  BP 1 Location: Left arm (10/02/21 0700)  BP 1 Method: Automatic (10/02/21 0700)  BP Patient Position: At rest (10/02/21 0700)  MEWS Score: 3 (10/02/21 0700)         Oxygen Therapy  O2 Sat (%): 96 % (10/02/21 1400)  Pulse via Oximetry: 103 beats per minute (10/02/21 1400)  O2 Device: None (Room air) (10/02/21 0700)      Last pain assessment:  Pain 1  Pain Scale 1: Numeric (0 - 10)  Pain Intensity 1: 10  Patient Stated Pain Goal: 0  Pain Onset 1:  (chest pain, across chest wall;;)      Wounds: No     Urinary catheter: voiding  Is there a smith order: No     LDAs:       Peripheral IV 10/02/21 Right Antecubital (Active)         Opportunity for questions and clarification was provided.     Marcela Elmore RN

## 2021-10-02 NOTE — CONSULTS
IP Cardiology Consult       Date of consult:  10/02/21  Date of admission: 10/2/2021  Primary Cardiologist: Dr Moises Dougherty   Physician Requesting consult: Dr Treasure Allen:    1. Paroxysmal Afib with RVR (h/o Afib post op and now has recurrent episodes)   2. MV disease s/p MVR in 7/2020   3. Atypical chest pain   - Cath in 1/2020 showed no CAD   4. GERD  5. 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 Negative MPI 2008; No CAD @ cath 1/2020.  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 *Worse MEDINA 9/2019: Severe MR w/ fail segment; No CAD @ cath:    *MV repair (P2 resection w/ #38 annuloplasty band via mini R thoracotomy;  [facility]) 7/2020 (delayed due to covid). *Echo 10/2020: EF 50-55%; good repair w/ no MS/MR    3) PAfib: post op 7/2020: spont NSR; Rx w amio/eliquis; Palps after but no documented Afib; Amio stopped; cont on dilt/bblocker. *NSR 9/2020: stopped eliquis/dilt; 5 beats (? Sx) NSVT, no afib: Seen by EP: \"findings don't suggests an increased risk; no further testing needed for the nonsustained VT episode. \" Weaned off metoprolol. 4) GERD  5) Obesity; weight down to 170# 2016; 233# 2019. 228# to 233 # 2020. Rare ethanol. No nicotine. 4-5 caff margy/day to 1/d. Previous salt in his diet. He works as a  & often has to lift heavy objects. Mr Marlene Elias presented with CP, chest tightness worse with deep breathing, sob, palpitations, noted to have Afib with RVR, during ER immediately after he had IV access- had episode of bradycardia with HR 50s-hypotension - diaphoresis - near syncope - likely vegal episodes. Converted to NSR after given Amio x1. He reported to have similar episode 1 week ago.         Recommendations:    1.   CP is atypical - likely in the setting of Afib with RVR, Trend CE to rule out ACS   2. Now in NSR, cont to monitor on tele   3. Will start AC with lovenox - discharge on Apixaban   4. Started on metoprolol   5. If has recurrent episodes will start antiarrythmic medications   6. Will need EP evaluation for consideration of ablation or AAD   7. Check 2d echo     Thank you for this consult and allowing me to take part in this patients care. Please call with questions. [x]        High complexity decision making was performed      CC / Reason for consult:  Afib with RVR     History of the presenting illness:  Amara Thompson is a 46 y.o. with history of MVR in 7/2020 - had Afib after MVR and repots also had Vagal episode post surgery . . he was on anticoagulation for few months, he was also on metoprololol. He remains in NSR so metoprolol and AC d/felicity. He reported to have episodes of sob, chest tightness when took deep breath and palpitations last week and the it resolved. This morning when he was at [employer] he has CP again, feels CP/ Tightness worse with deep inspiration, associated with SOB, palpitations. Came to ER. Noted to have Afib with RVR. During ER when he got IV access, immediately after that he has slowing of HF, hypotension, become nearly unresponsive - and improved quickly - likely vegal. His BP was low. He was given Amiodarone x1. He converted to NSR. Now remains NSR. Past Medical History:   Diagnosis Date    GERD (gastroesophageal reflux disease)     Mitral valve problem 2020       Prior to Admission medications    Medication Sig Start Date End Date Taking? Authorizing Provider   multivit-minerals/FA/lycopene (ONE-A-DAY MEN'S PO) Take  by mouth daily.     Provider, Historical       Family History   Problem Relation Age of Onset    Cancer Mother     Diabetes Mother     Diabetes Brother     Headache Maternal Grandfather     Diabetes Maternal Grandfather     Cancer Father           Social History     Socioeconomic History    Marital status:      Spouse name: Not on file    Number of children: 2    Years of education: 15    Highest education level: 12th grade   Occupational History    Not on file   Tobacco Use    Smoking status: Former Smoker     Quit date: 3/10/1988     Years since quittin.5    Smokeless tobacco: Never Used   Substance and Sexual Activity    Alcohol use: Yes     Comment: very rare    Drug use: No    Sexual activity: Yes   Other Topics Concern     Service No    Blood Transfusions No    Caffeine Concern No    Occupational Exposure No    Hobby Hazards No    Sleep Concern Yes    Stress Concern No    Weight Concern No    Special Diet No    Back Care No    Exercise Yes    Bike Helmet No    Seat Belt Yes    Self-Exams No   Social History Narrative    Not on file     Social Determinants of Health     Financial Resource Strain:     Difficulty of Paying Living Expenses:    Food Insecurity:     Worried About Running Out of Food in the Last Year:     Ran Out of Food in the Last Year:    Transportation Needs:     Lack of Transportation (Medical):      Lack of Transportation (Non-Medical):    Physical Activity:     Days of Exercise per Week:     Minutes of Exercise per Session:    Stress:     Feeling of Stress :    Social Connections:     Frequency of Communication with Friends and Family:     Frequency of Social Gatherings with Friends and Family:     Attends [employer] Services:     Active Member of Clubs or Organizations:     Attends Club or Organization Meetings:     Marital Status:    Intimate Partner Violence:     Fear of Current or Ex-Partner:     Emotionally Abused:     Physically Abused:     Sexually Abused:          ROS      Total of 12 systems reviewed, all systems review was negative except Pertinent Positives included in HPI     Visit Vitals  /64   Pulse 86   Temp 98.7 °F (37.1 °C)   Resp 20   Ht 6' (1.829 m)   Wt 104.3 kg (230 lb)   SpO2 98%   BMI 31.19 kg/m² Examination:     General: Alert + Oriented x3, no acute distress   HEENT: Normocephalic aromatic, MMM   Neck: Supple, JVP- not well appreciated   RS: Non labored, clear   CVS: Regular rate and rhythm, S1S2, no murmur   Abd: Soft, non tender, non distended   Lower extremity: Warm to touch, Edema- None   Skin: Warm and dry, No significant bruises or rash   CNS: Oriented x3, no focal neuro deficit     Lab review:  BMP:   Lab Results   Component Value Date/Time     10/02/2021 07:41 AM    K 4.0 10/02/2021 07:41 AM     10/02/2021 07:41 AM    CO2 28 10/02/2021 07:41 AM    AGAP 4 (L) 10/02/2021 07:41 AM     (H) 10/02/2021 07:41 AM    BUN 14 10/02/2021 07:41 AM    CREA 1.19 10/02/2021 07:41 AM    GFRAA >60 10/02/2021 07:41 AM    GFRNA >60 10/02/2021 07:41 AM        CBC:  Lab Results   Component Value Date/Time    WBC 9.6 10/02/2021 07:18 AM    Hemoglobin (POC) 16.7 01/05/2015 10:27 AM    HGB 16.2 10/02/2021 07:18 AM    Hematocrit (POC) 49 01/05/2015 10:27 AM    HCT 46.3 10/02/2021 07:18 AM    PLATELET 025 73/66/5547 07:18 AM    MCV 89.6 10/02/2021 07:18 AM       All Cardiac Markers in the last 24 hours:    Lab Results   Component Value Date/Time    TROIQ <0.05 10/02/2021 07:41 AM    TNIPOC <0.04 10/02/2021 07:20 AM       Data review:    Tele: NSR    EKG tracing personally reviewed: Afib with RVR, NSTT abn     2nd EKG showed NSR, NSTT     Echocardiogram:  Reviewed previous echos     Other cardiac testing:  Cath in 2020 showed no significant disease. Other imaging:        Signed:  Susan Gordon MD  Interventional Cardiology  10/2/2021    ___________________________________________________________    Office Note from Dec 17 2020         Return Office Visit    December 17, 2020      Jennifer Nunez  48year old M (1970)  Account #: [de-identified]  PRIMARY CARE PHYSICIAN:  Axel Nguyen MD  CARDIOLOGIST:  Justa Meredith.  Jose Juan HOGAN    REASON FOR VISIT:  This 48year old male presents for Valvular Heart Disease and AFib. 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 Negative MPI 2008; No CAD @ cath 1/2020.  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 *Worse MEDINA 9/2019: Severe MR w/ fail segment; No CAD @ cath:    *MV repair (P2 resection w/ #38 annuloplasty band via mini R thoracotomy; Dr Kelsey Masters) 7/2020 (delayed due to covid). *Echo 10/2020: EF 50-55%; good repair w/ no MS/MR    3) PAfib: post op 7/2020: spont NSR; Rx w amio/eliquis; Palps after but no documented Afib; Amio stopped; cont on dilt/bblocker. *NSR 9/2020: stopped eliquis/dilt; 5 beats (? Sx) NSVT, no afib: Seen by EP: \"findings don't suggests an increased risk; no further testing needed for the nonsustained VT episode. \" Weaned off metoprolol. 4) GERD  5) Obesity; weight down to 170# 2016; 233# 2019. 228# to 233 # 2020. Rare ethanol. No nicotine. 4-5 caff margy/day to 1/d. Previous salt in his diet. He works as a  & often has to lift heavy objects. 9/2020:  Valve repair as noted above. No chest pain or dyspnea. Lightheadedness if he gets up too quickly. No recent palpitations. Doing well in outpt cardiac rehab.    10/2020 (NP OV): Doing well since stopping dilt and Eliquis. No flutters or palpitations. No CP, MEDINA, edema or syncope. He stays active working and hunting without issue. 13yo son keeps him busy. Mild dizziness is improved. 12/2020: Saw Dr Fidelina Jimenez last month who felt no further EP eval was warranted; no chest pain, dyspnea, nor palpitations. Lightheadedness only if getting up too quickly; not progressive. CARDIOVASCULAR PROCEDURES  Procedure Date Results   Cath 01/16/2020 EF 0.60 (60%), Normal Coronaries, Severe MR.   CV Surgery 07/01/2020 MV repair (P2 resection w/ #38 annuloplasty band via mini R thoracotomy), delayed due to covid. Echo 10/28/2020 EF 0.50 (50%), EF range 50%-55%, Normal RV. Mild LVH. Mitral valve repair is stable; no MS/MR. PAp 35. Echo 10/14/2019 EF 0.60 (60%), Mild LVH, LA Diam 4.6 cm, Est RVSP 41 mmHg, Normal RV. 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 Eccentric, anteriorly directed MR appears severe. Echo 2008 Normal EF,  mild myxomatous degeneration of mitral valve with mild MR. EKG 2020 Sinus Rhythm, Nonpathologic inferior Q-waves; . Early transition. EVR 2020 NSR, ; No afib; 1% PVCs; <1% PACs. 5 beats NSVT (? Sx; possibly \"flushed\"): to see EP; d/w pt. MPI 2008 EF 0.60 (60%), No convincing ischemia; 10:26. -CP. -ecg. EJANIE 2019 EF 0.65 (65%), Myxomatous mitral valve with posterior leaflet prolapse and probable small flail segment; Severe mitral regurgitation is anteriorly directed (severe by color doppler & ERO of 0.8 cm2). No mitral stenosis. ACTIVE ALLERGIES:  Ingredient Reaction Comment   NO KNOWN ALLERGIES       None    PROBLEM LIST:  Problem Description Chronic   AFIB Y   Mitral valve insufficiency Y       PAST MEDICAL/SURGICAL HISTORY  (Detailed)    Disease/disorder Onset Date Surgical History Date Comments            Family History  (Detailed)  Relationship Family Member Name  Age at Death Condition Onset Age Cause of Death   Brother    Diabetes     Father    Diabetes     Mother    Diabetes       SOCIAL HISTORY  (Detailed)  Tobacco use reviewed. Preferred language is [place]. EDUCATION/EMPLOYMENT/OCCUPATION  Employment History Status Retired [address]  full-time       Smoking status: Former smoker. SMOKING STATUS  Use Status Type Smoking Status Usage Per Day Years Used Total Pack Years   yes  Former smoker            ALCOHOL  There is no history of alcohol use. LIFESTYLE  Exercises 3-4 times a week. REVIEW OF SYMPTOMS:    CONST - Negative for weight gain, weight loss, fever. EYES - Negative for visual changes.   ENT - Negative for hearing loss. RESP - Negative for snoring, hemoptysis, dyspnea. CARD - Negative for chest pain, diaphoresis, orthopnea, palpitation, syncope, PND.  VASC - Negative for claudication, edema. GI - Negative for nausea, reflux, bleeding.  - Negative for hematuria, nocturia. REPROD - Negative for erectile dysfunction. ENDO - Negative for goiter, tremors. NEURO - Negative for dizziness, memory loss, seizures. PSYCH - Negative for depression, hallucinations. DERM - Negative for rash, skin sores. M/S - Negative for joint pain, myalgia. HEMAT - Negative for acute anemia, thrombocytopenia. VITAL SIGNS  Time BP mm/Hg Pulse /min Resp /min Temp F Ht ft Ht in Ht cm Wt lb Wt kg BMI kg/m2 BSA m2 O2 Sat%   3:04 /80 100   6.0 0.00 182.88 233.00 105.687 31.60         PHYSICAL EXAM:  Exam Findings Details   Const Neg Level of Distress - Awake / Alert. Nourishment - Well Nourished. Neck Neg JVP - Less than 8 cm of H20. Resp Neg Respirations - Nonlabored. Breath Sounds - Clear Throughout. Rales - Absent. Wheezes - Absent. Rhonchi - Absent. Cardiac Neg Rhythm - Regular. Palpation - PMI Normal.  Heart Sounds - S1 Normal, S2 Normal, No S3, No S4.   Cardiac Pos Murmurs - I/VI systolic ejection murmur. Vasc Neg Carotid - No Bruits Noted. Posterior Tibial - Bilateral Normal Pulse. EXT Neg Lower Extremity Edema - Absent. Psych Neg Orientation - Oriented to Time, Person, Place. IMPRESSION AND PLAN  01. Nonrheumatic mitral (valve) insufficiency (I34.0):  MV repair 7/2020; ASx. ABx prophylaxis recommended for dental procedures; Rx given to pt. Needs f/u echos. We discussed the signs and symptoms of unstable angina, myocardial infarction and malignant arrhythmia. The patient knows to seek immediate medical attention should they occur. 2-D w/CFD Echo to be done as specified in the orders. 02.  Paroxysmal atrial fibrillation (I48.0):  Postop after mitral valve repair 07/2020; no known recurrence. Eliquis & diltiazem stopped 9/2020. Weaned off metoprolol 11/2020 after EVR showed no afib  ECG done   03. Ventricular tachycardia (I47.2): As noted by Dr Shefali Patel: Marjyotia Jennifer episode (5 beats) seen in 30 days. Self-limited. No symptoms. I don't think the finding suggests an increased risk to him going forward. No change to the plan, no further testing needed for the nonsustained VT episode. \"    Remains ASx. 04. Long term (current) use of aspirin (Z79.82)   05. Body mass index [BMI] 31.0-31.9, adult (Z68.31)     ORDERS:  1 ECG done    2 Dietary management education, guidance, and counseling    3 2-D w/ CFD Echocardiogram in 1 Year. 4 Return office visit with Traci Manzano MD in 1 Year. FINAL MEDICATION LIST  Medication Sig Desc Non-VCS   aspirin 81 mg chewable tablet chew 1 tablet by oral route  every day Y       Electronically signed by:       Dov Manzano MD 12/17/2020  @ 5:37 PM    Document generated by: Vic Wells 12/17/2020      ----------------------------------------------------------------------------------------------------------------------------------------------------------------------

## 2021-10-02 NOTE — ED NOTES
This nurse came into the room to speak with pt. A second RN was in the room finishing up starting an IV. Pt converted from RVR but remained in Afib. Pt's heart rate dropped from 166 to 60's. This RN and pt were talking as he converted and then pt stated he didn't feel well and pt became diaphoretic and pt became unresponsive. MD Spencer May at bedside. Pt heart rate dropped to the 50's. Placed pt on pads and pt started to become more responsive.    0729- Pt is hypotensive.  Fluids started on pressure bag    0838- Pt transported to CT with Zoll monitor and MD Spencer May

## 2021-10-02 NOTE — H&P
Hospitalist Admission Note    NAME: Trevor Kramer   :  1970   MRN:  [id number]     Date/Time:  10/2/2021 9:25 AM    Patient PCP: Elias Saxena MD    Please note that this dictation was completed with [employer] the computer voice recognition software. Quite often unanticipated grammatical, syntax, homophones, and other interpretive errors are inadvertently transcribed by the computer software. Please disregard these errors. Please excuse any errors that have escaped final proofreading  ______________________________________________________________________   Assessment & Plan:  Chest pain, POA ? pleurisy, rule out MI  Witnessed syncope in ER with associated bradycardia and hypotension, suspected vasovagal as occurred 1.5 minutes after IV placement  Atrial fib with RVR, POA  Converted to SR after amiodarone bolus in ER  Small right pleural effusion, POA of unclear etiology  Hx postop afib 2020 after mitral valve repair  --bilateral substernal pressure, pleuritic with inspiration, radiate into left arm, associated with SOB. EKG with nonspecific T abnormality. --continue aspirin 81mg daily. Serial troponin. Cardiology consulted in ER.  --restart metoprolol 25mg q12h. Anticoagulate with lovenox 1mg/kg q12h for now. Upon discharge, can restart eliquis which he has been on in the past  --Check rapid covid. He is unvaccinated. --check tsh, echo, probnp  --toradol and fentanyl prn for pain. S/p mitral valve repair 2020  --continue aspirin 81mg    Hx thrombus in left leg greater saphenous vein 2021, unprovoked  --not placed anticoagulation as it is a superficial vein. Continue aspirin  --left foot/ankle swelling resolved after 1 month    Body mass index is 31.19 kg/m².     Code: full  DVT prophylaxis: lovenox  Surrogate decision maker:  Wife Juju Dockery          Subjective:   CHIEF COMPLAINT:  Chest pain    HISTORY OF PRESENT ILLNESS:     Trevor Kramer is a 46 y.o. male with past medical history of severe mitral regurgitation who underwent mitral valve repair July 2020 by Dr. Melissa Ashraf, hx of postop afib (placed on amiodarone, diltiazem, metoprolol, eliquis at discharge 7/20), now just on ASA 81mg, presents to ER with chest pain. He reports severe chest pain started last Sunday 9/26 a lot of chest pain while at [employer], thought from heavy lifting the day before (lifted cast iron pot belly stove). He took tylenol and left over oxycodone from surgery and pain resolved. He was chest pain free for whole week. Last night had pain in chest when sleeping on his side, made him have to lay on his back. Today while walking at [employer], developed severe pain in center of chest 10/10, sharp and pressure, more sharp with inspiration. Radiate into left chest and shoulder and then into right chest.   Pain improved with fentanyl in ER. Now only have sharp pain with inspiration 5/10. Chest pain is associated with SOB and +palpitations last Sunday and again today. No n/v or sweats. Unvaccinated for covid-19. Denies fever, chills, lost of taste or smell, diarrhea, headache, myalgias. Patient noted to be in afib with RVR on arrival HR in 140-150s. About 1.5 minutes after IV placement, ER nurse noted  \"pt's heart rate dropped from 166 to 60's. Pt stated he didn't feel well and pt became diaphoretic and pt became unresponsive. \" MD Sriram Andre at bedside. Pt heart rate dropped to the 50's. Placed pt on pads and pt started to become more responsive. He then became more tachycardic still in afib in 100s with low BP 83/55 so was given IVF and amiodarone bolus 150mg. He then converted into SR. Wife reports he had similar syncopal episode when got him up after MV repair and took out IVs for discharge where he became diaphoretic, dizzy and passed out and was in afib with rvr at that time. He also report they had difficulty regulating blood sugars postop.   Patient was taken off amiodarone, diltiazem, metoprolol and eliquis about 1 month after discharge 2020 by his cardiologist, after wearing Holter monitor by his report. Reports he was diagnosed with thrombus in left leg clot in left great saphenous vein in 2021 after waking up with left foot and ankle swelling. Did not require anticoagulation and swelling resolved after 1 month. No other recent illness. Chart review showed  Cardiac cath 20 by Dr. Farideh Carrillo  CONCLUSION/post procedure Dx:   1. Normal LV function. 2. No CAD. 3. Severe MR  PLAN: Surgery consult for mitral valve repair. 21:    Dr. Joseph Kingston did: Exposure of femoral artery and femoral vein for peripheral cannulation (to be dictated separately by co-surgeon, Dr. Dipesh Pedroza). 2.  Placement of femoral arterial line. 3.  Minimally invasive complex mitral valve repair of degenerative mitral disease. 4.  Transesophageal echocardiography. We were asked to admit for work up and evaluation of the above problems. Past Medical History:   Diagnosis Date    GERD (gastroesophageal reflux disease)     Mitral valve problem       Past Surgical History:   Procedure Laterality Date    HX HEART CATHETERIZATION  2020    HX HEENT      wisdom teeth    HX ORTHOPAEDIC      L arm tendon repair/pinning    HX TONSILLECTOMY       Social History     Tobacco Use    Smoking status: Former Smoker     Quit date: 3/10/1988     Years since quittin.5    Smokeless tobacco: Never Used   Substance Use Topics    Alcohol use: Yes     Comment: very rare      ,       Family History   Problem Relation Age of Onset    Cancer Mother     Diabetes Mother     Diabetes Brother     Headache Maternal Grandfather     Diabetes Maternal Grandfather     Cancer Father      No Known Allergies     Prior to Admission medications    Medication Sig Start Date End Date Taking?  Authorizing Provider   multivit-minerals/FA/lycopene (ONE-A-DAY MEN'S PO) Take  by mouth daily. Provider, Historical     REVIEW OF SYSTEMS:  POSITIVE= Bold. Negative = normal text  General:  fever, chills, sweats today before passing out, generalized weakness, weight loss/gain, loss of appetite  Eyes:  blurred vision, eye pain, loss of vision, diplopia  Ear Nose and Throat:  rhinorrhea, pharyngitis  Respiratory:   cough, sputum production, SOB, wheezing, MEDINA, pleuritic pain  Cardiology:  chest pain, palpitations, orthopnea, PND, left ankle and foot edema, syncope   Gastrointestinal:  abdominal pain, N/V, dysphagia, diarrhea, constipation, bleeding  Genitourinary:  frequency, urgency, dysuria, hematuria, incontinence  Muskuloskeletal :  arthralgia, myalgia  Hematology:  easy bruising, bleeding, lymphadenopathy  Dermatological:  rash, ulceration, pruritis  Endocrine:  hot flashes or polydipsia  Neurological:  headache, dizziness, confusion, focal weakness, paresthesia, memory loss, gait disturbance  Psychological: anxiety, depression, agitation      Objective:   VITALS:    Visit Vitals  /64   Pulse 86   Temp 98.7 °F (37.1 °C)   Resp 20   Ht 6' (1.829 m)   Wt 104.3 kg (230 lb)   SpO2 98%   BMI 31.19 kg/m²     Temp (24hrs), Av.7 °F (37.1 °C), Min:98.7 °F (37.1 °C), Max:98.7 °F (37.1 °C)    Body mass index is 31.19 kg/m². PHYSICAL EXAM:    General:    Alert, pleasant overweight male, cooperative, no distress, appears stated age. HEENT: Atraumatic, anicteric sclerae, pink conjunctivae     No oral ulcers, mucosa moist, throat clear. Hearing intact. Neck:  Supple, symmetrical,  thyroid: non tender  Lungs:   Clear to auscultation bilaterally. No Wheezing or Rhonchi. No rales. Chest wall:  No tenderness to palpation. No Accessory muscle use. Heart:   Regular  rhythm,  No  murmur   No gallop. No edema. Abdomen:   Soft, non-tender. Not distended. Bowel sounds normal. No masses  Extremities: No cyanosis. No clubbing.   Varicose veins in left lower leg  Skin: Not pale Not Jaundiced  No rashes   Psych:  Good insight. Not depressed. Not anxious or agitated. Neurologic: EOMs intact. No facial asymmetry. No aphasia or slurred speech. Symmetrical strength, Alert and oriented X 3. Peripheral pulse: Left, Radial, 2+      IMAGING RESULTS:   []       I have personally reviewed the actual   []     CXR  []     CT scan  CXR:  CTA chest:  Impression:      No evidence for pulmonary embolism. Incidental small right pleural effusion. Narrative:    EXAM: CTA CHEST W OR W WO CONT     INDICATION: Atrial fibrillation. r/o PE     COMPARISON: None. CONTRAST: 100 mL of Isovue-370. TECHNIQUE:   Precontrast  images were obtained to localize the volume for acquisition. Multislice helical CT arteriography was performed from the diaphragm to the   thoracic inlet during uneventful rapid bolus intravenous contrast   administration. Lung and soft tissue windows were generated.  Coronal and   sagittal images were generated and 3D post processing consisting of coronal   maximum intensity images was performed.  CT dose reduction was achieved through   use of a standardized protocol tailored for this examination and automatic   exposure control for dose modulation. FINDINGS:   The lungs are clear of mass, nodule, airspace disease or edema. There is a small   right pleural effusion. The pulmonary arteries are well enhanced and no pulmonary emboli are identified. There is no mediastinal or hilar adenopathy or mass. The aorta enhances normally   without evidence of aneurysm or dissection. Minimal cardiomegaly. The visualized portions of the upper abdominal organs are remarkable for a left   hepatic 1 cm cyst with density measurement of 12.3 (axial image 36). EKG: reviewed. At 7:03am, afib 157 with nonspecific ST changes.   At 8:07am, NSR, Q waves in III (unchanged from 7/29/20), nonspecific T flat laterally   ________________________________________________________________________  Care Plan discussed with:    Comments   Patient y    [facility]:      ________________________________________________________________________  Prophylaxis:  GI none   DVT lovenox   ________________________________________________________________________  Recommended Disposition:   Home with Family y   HH/PT/OT/RN    SNF/LTC    [facility]    ________________________________________________________________________  Code Status:  Full Code y   DNR/DNI    ________________________________________________________________________  TOTAL TIME:  55 minutes      Comments    y Reviewed previous records   >50% of visit spent in counseling and coordination of care  Discussion with patient and/or family and questions answered         ______________________________________________________________________  Bryant Carcamo MD      Procedures: see electronic medical records for all procedures/Xrays and details which were not copied into this note but were reviewed prior to creation of Plan. LAB DATA REVIEWED:    Recent Results (from the past 24 hour(s))   EKG, 12 LEAD, INITIAL    Collection Time: 10/02/21  7:03 AM   Result Value Ref Range    Ventricular Rate 157 BPM    Atrial Rate 220 BPM    QRS Duration 102 ms    Q-T Interval 296 ms    QTC Calculation (Bezet) 478 ms    Calculated R Axis 4 degrees    Calculated T Axis 3 degrees    Diagnosis       Atrial fibrillation with rapid ventricular response  Inferior infarct , age undetermined  When compared with ECG of 29-JUL-2020 08:30,  Atrial fibrillation has replaced Sinus rhythm  Vent.  rate has increased BY  76 BPM  ST no longer depressed in Lateral leads  Nonspecific T wave abnormality, improved in Lateral leads     CBC WITH AUTOMATED DIFF    Collection Time: 10/02/21  7:18 AM   Result Value Ref Range    WBC 9.6 4.1 - 11.1 K/uL    RBC 5.17 4.10 - 5.70 M/uL    HGB 16.2 12.1 - 17.0 g/dL    HCT 46.3 36.6 - 50.3 %    MCV 89.6 80.0 - 99.0 FL    MCH 31.3 26.0 - 34.0 PG    MCHC 35.0 30.0 - 36.5 g/dL    RDW 12.5 11.5 - 14.5 %    PLATELET 564 491 - 315 K/uL    MPV 10.0 8.9 - 12.9 FL    NRBC 0.0 0  WBC    ABSOLUTE NRBC 0.00 0.00 - 0.01 K/uL    NEUTROPHILS 65 32 - 75 %    LYMPHOCYTES 24 12 - 49 %    MONOCYTES 7 5 - 13 %    EOSINOPHILS 2 0 - 7 %    BASOPHILS 1 0 - 1 %    IMMATURE GRANULOCYTES 1 (H) 0.0 - 0.5 %    ABS. NEUTROPHILS 6.3 1.8 - 8.0 K/UL    ABS. LYMPHOCYTES 2.3 0.8 - 3.5 K/UL    ABS. MONOCYTES 0.7 0.0 - 1.0 K/UL    ABS. EOSINOPHILS 0.2 0.0 - 0.4 K/UL    ABS. BASOPHILS 0.1 0.0 - 0.1 K/UL    ABS. IMM. GRANS. 0.1 (H) 0.00 - 0.04 K/UL    DF AUTOMATED     SAMPLES BEING HELD    Collection Time: 10/02/21  7:18 AM   Result Value Ref Range    SAMPLES BEING HELD ALFONSO,RED     COMMENT        Add-on orders for these samples will be processed based on acceptable specimen integrity and analyte stability, which may vary by analyte.    POC TROPONIN-I    Collection Time: 10/02/21  7:20 AM   Result Value Ref Range    Troponin-I (POC) <0.04 0.00 - 0.08 ng/mL   GLUCOSE, POC    Collection Time: 10/02/21  7:40 AM   Result Value Ref Range    Glucose (POC) 130 (H) 65 - 117 mg/dL    Performed by Anmol Reyna RN    MAGNESIUM    Collection Time: 10/02/21  7:41 AM   Result Value Ref Range    Magnesium 2.1 1.6 - 2.4 mg/dL   TROPONIN I    Collection Time: 10/02/21  7:41 AM   Result Value Ref Range    Troponin-I, Qt. <0.05 <9.39 ng/mL   METABOLIC PANEL, COMPREHENSIVE    Collection Time: 10/02/21  7:41 AM   Result Value Ref Range    Sodium 137 136 - 145 mmol/L    Potassium 4.0 3.5 - 5.1 mmol/L    Chloride 105 97 - 108 mmol/L    CO2 28 21 - 32 mmol/L    Anion gap 4 (L) 5 - 15 mmol/L    Glucose 130 (H) 65 - 100 mg/dL    BUN 14 6 - 20 MG/DL    Creatinine 1.19 0.70 - 1.30 MG/DL    BUN/Creatinine ratio 12 12 - 20      GFR est AA >60 >60 ml/min/1.73m2    GFR est non-AA >60 >60 ml/min/1.73m2 Calcium 9.0 8.5 - 10.1 MG/DL    Bilirubin, total 1.3 (H) 0.2 - 1.0 MG/DL    ALT (SGPT) 25 12 - 78 U/L    AST (SGOT) 17 15 - 37 U/L    Alk.  phosphatase 83 45 - 117 U/L    Protein, total 7.3 6.4 - 8.2 g/dL    Albumin 3.1 (L) 3.5 - 5.0 g/dL    Globulin 4.2 (H) 2.0 - 4.0 g/dL    A-G Ratio 0.7 (L) 1.1 - 2.2     EKG, 12 LEAD, SUBSEQUENT    Collection Time: 10/02/21  8:07 AM   Result Value Ref Range    Ventricular Rate 87 BPM    Atrial Rate 87 BPM    P-R Interval 146 ms    QRS Duration 108 ms    Q-T Interval 376 ms    QTC Calculation (Bezet) 452 ms    Calculated P Axis 68 degrees    Calculated R Axis 17 degrees    Calculated T Axis 27 degrees    Diagnosis       Normal sinus rhythm  Nonspecific T wave abnormality  When compared with ECG of 02-OCT-2021 07:03,  MANUAL COMPARISON REQUIRED, DATA IS UNCONFIRMED

## 2021-10-02 NOTE — ED PROVIDER NOTES
EMERGENCY DEPARTMENT HISTORY AND PHYSICAL EXAM      Date: 10/2/2021  Patient Name: Yaa Millan  Patient Age and Sex: 46 y.o. male     History of Presenting Illness     Chief Complaint   Patient presents with    Chest Pain     ED visit d/t chest pain - onset of sxs, PTA - hx of cardiac surgery;; chest pain, 10/10;;       History Provided By: Patient    HPI: Yaa Millan is a 59-year-old male with a history of mitral valve repair by Dr. Kymberly Wolff a year ago presenting for chest pain. Patient states that for the past couple of days, has been having chest pain that radiates across his entire chest.  States that it is sharp and worse when he takes a deep breath in. Patient states it is associated with shortness of breath as well as some sweating. Denies any history of cardiac problems and has seen Dr. Tanika Edmonds. Patient states that he did have atrial fibrillation and issues with his blood pressure when he was in the hospital for his mitral valve repair. States that it was fixed though in the hospital and currently only on baby aspirin. Not on any other medications. No history of diabetes, cholesterol. Patient denies being on any blood thinners. There are no other complaints, changes, or physical findings at this time. PCP: Rodri Castellano., MD    No current facility-administered medications on file prior to encounter. Current Outpatient Medications on File Prior to Encounter   Medication Sig Dispense Refill    aspirin delayed-release 81 mg tablet Take 81 mg by mouth daily.  multivit-minerals/FA/lycopene (ONE-A-DAY MEN'S PO) Take  by mouth daily.          Past History     Past Medical History:  Past Medical History:   Diagnosis Date    GERD (gastroesophageal reflux disease)     Mitral valve problem 01/2020    severe mitral regurgitation    Paroxysmal A-fib ([address] 10/02/2021    and post mitral valve repair 7/2020    Superficial vein thrombosis 07/2021    left greater saphenous vein Past Surgical History:  Past Surgical History:   Procedure Laterality Date    HX HEART CATHETERIZATION  2020    HX HEENT      wisdom teeth    HX ORTHOPAEDIC      L arm tendon repair/pinning    HX OTHER SURGICAL  2020    mitral valve repair Dr. Je Molina    HX TONSILLECTOMY         Family History:  Family History   Problem Relation Age of Onset    Cancer Mother     Diabetes Mother     Diabetes Brother     Headache Maternal Grandfather     Diabetes Maternal Grandfather     Cancer Father        Social History:  Social History     Tobacco Use    Smoking status: Former Smoker     Quit date: 3/10/1988     Years since quittin.5    Smokeless tobacco: Never Used   Substance Use Topics    Alcohol use: Yes     Comment: very rare    Drug use: No       Allergies:  No Known Allergies      Review of Systems   Review of Systems   Constitutional: Positive for diaphoresis. Negative for chills and fever. Respiratory: Positive for shortness of breath. Negative for cough. Cardiovascular: Positive for chest pain. Gastrointestinal: Negative for abdominal pain, constipation, diarrhea, nausea and vomiting. Genitourinary: Negative for dysuria, frequency and hematuria. Neurological: Negative for weakness and numbness. All other systems reviewed and are negative. Physical Exam   Physical Exam  Vitals and nursing note reviewed. Constitutional:       Appearance: He is well-developed. HENT:      Head: Normocephalic and atraumatic. Nose: Nose normal.      Mouth/Throat:      Mouth: Mucous membranes are moist.   Eyes:      Extraocular Movements: Extraocular movements intact. Conjunctiva/sclera: Conjunctivae normal.   Cardiovascular:      Rate and Rhythm: Tachycardia present. Rhythm irregular. Pulmonary:      Effort: Pulmonary effort is normal. No respiratory distress. Breath sounds: Normal breath sounds. Abdominal:      General: There is no distension.       Palpations: Abdomen is soft. Tenderness: There is no abdominal tenderness. Musculoskeletal:         General: Normal range of motion. Cervical back: Normal range of motion and neck supple. Skin:     General: Skin is warm and dry. Neurological:      General: No focal deficit present. Mental Status: He is alert and oriented to person, place, and time. Mental status is at baseline. Psychiatric:         Mood and Affect: Mood normal.          Diagnostic Study Results     Labs -     Recent Results (from the past 12 hour(s))   EKG, 12 LEAD, INITIAL    Collection Time: 10/02/21  7:03 AM   Result Value Ref Range    Ventricular Rate 157 BPM    Atrial Rate 220 BPM    QRS Duration 102 ms    Q-T Interval 296 ms    QTC Calculation (Bezet) 478 ms    Calculated R Axis 4 degrees    Calculated T Axis 3 degrees    Diagnosis       Atrial fibrillation with rapid ventricular response  Inferior infarct , age undetermined  When compared with ECG of 29-JUL-2020 08:30,  Atrial fibrillation has replaced Sinus rhythm  Vent. rate has increased BY  76 BPM  ST no longer depressed in Lateral leads  Nonspecific T wave abnormality, improved in Lateral leads     CBC WITH AUTOMATED DIFF    Collection Time: 10/02/21  7:18 AM   Result Value Ref Range    WBC 9.6 4.1 - 11.1 K/uL    RBC 5.17 4.10 - 5.70 M/uL    HGB 16.2 12.1 - 17.0 g/dL    HCT 46.3 36.6 - 50.3 %    MCV 89.6 80.0 - 99.0 FL    MCH 31.3 26.0 - 34.0 PG    MCHC 35.0 30.0 - 36.5 g/dL    RDW 12.5 11.5 - 14.5 %    PLATELET 158 611 - 877 K/uL    MPV 10.0 8.9 - 12.9 FL    NRBC 0.0 0  WBC    ABSOLUTE NRBC 0.00 0.00 - 0.01 K/uL    NEUTROPHILS 65 32 - 75 %    LYMPHOCYTES 24 12 - 49 %    MONOCYTES 7 5 - 13 %    EOSINOPHILS 2 0 - 7 %    BASOPHILS 1 0 - 1 %    IMMATURE GRANULOCYTES 1 (H) 0.0 - 0.5 %    ABS. NEUTROPHILS 6.3 1.8 - 8.0 K/UL    ABS. LYMPHOCYTES 2.3 0.8 - 3.5 K/UL    ABS. MONOCYTES 0.7 0.0 - 1.0 K/UL    ABS. EOSINOPHILS 0.2 0.0 - 0.4 K/UL    ABS. BASOPHILS 0.1 0.0 - 0.1 K/UL    ABS. ERIC Patton. 0.1 (H) 0.00 - 0.04 K/UL    DF AUTOMATED     SAMPLES BEING HELD    Collection Time: 10/02/21  7:18 AM   Result Value Ref Range    SAMPLES BEING HELD ALFONSO,RED     COMMENT        Add-on orders for these samples will be processed based on acceptable specimen integrity and analyte stability, which may vary by analyte. POC TROPONIN-I    Collection Time: 10/02/21  7:20 AM   Result Value Ref Range    Troponin-I (POC) <0.04 0.00 - 0.08 ng/mL   GLUCOSE, POC    Collection Time: 10/02/21  7:40 AM   Result Value Ref Range    Glucose (POC) 130 (H) 65 - 117 mg/dL    Performed by Nurys Russell RN    MAGNESIUM    Collection Time: 10/02/21  7:41 AM   Result Value Ref Range    Magnesium 2.1 1.6 - 2.4 mg/dL   TROPONIN I    Collection Time: 10/02/21  7:41 AM   Result Value Ref Range    Troponin-I, Qt. <0.05 <3.42 ng/mL   METABOLIC PANEL, COMPREHENSIVE    Collection Time: 10/02/21  7:41 AM   Result Value Ref Range    Sodium 137 136 - 145 mmol/L    Potassium 4.0 3.5 - 5.1 mmol/L    Chloride 105 97 - 108 mmol/L    CO2 28 21 - 32 mmol/L    Anion gap 4 (L) 5 - 15 mmol/L    Glucose 130 (H) 65 - 100 mg/dL    BUN 14 6 - 20 MG/DL    Creatinine 1.19 0.70 - 1.30 MG/DL    BUN/Creatinine ratio 12 12 - 20      GFR est AA >60 >60 ml/min/1.73m2    GFR est non-AA >60 >60 ml/min/1.73m2    Calcium 9.0 8.5 - 10.1 MG/DL    Bilirubin, total 1.3 (H) 0.2 - 1.0 MG/DL    ALT (SGPT) 25 12 - 78 U/L    AST (SGOT) 17 15 - 37 U/L    Alk.  phosphatase 83 45 - 117 U/L    Protein, total 7.3 6.4 - 8.2 g/dL    Albumin 3.1 (L) 3.5 - 5.0 g/dL    Globulin 4.2 (H) 2.0 - 4.0 g/dL    A-G Ratio 0.7 (L) 1.1 - 2.2     NT-PRO BNP    Collection Time: 10/02/21  7:41 AM   Result Value Ref Range    NT pro- (H) <125 PG/ML   EKG, 12 LEAD, SUBSEQUENT    Collection Time: 10/02/21  8:07 AM   Result Value Ref Range    Ventricular Rate 87 BPM    Atrial Rate 87 BPM    P-R Interval 146 ms    QRS Duration 108 ms    Q-T Interval 376 ms    QTC Calculation (Bezet) 452 ms Calculated P Axis 68 degrees    Calculated R Axis 17 degrees    Calculated T Axis 27 degrees    Diagnosis       Normal sinus rhythm  Nonspecific T wave abnormality  When compared with ECG of 02-OCT-2021 07:03,  MANUAL COMPARISON REQUIRED, DATA IS UNCONFIRMED     TROPONIN I    Collection Time: 10/02/21 11:06 AM   Result Value Ref Range    Troponin-I, Qt. <0.05 <0.05 ng/mL   COVID-19 RAPID TEST    Collection Time: 10/02/21 11:06 AM   Result Value Ref Range    Specimen source Nasopharyngeal      COVID-19 rapid test Not detected NOTD         Radiologic Studies -   CTA CHEST W OR W WO CONT   Final Result      No evidence for pulmonary embolism. Incidental small right pleural effusion. XR CHEST PORT   Final Result   No acute cardiopulmonary process seen        CT Results  (Last 48 hours)               10/02/21 0851  CTA CHEST W OR W WO CONT Final result    Impression:      No evidence for pulmonary embolism. Incidental small right pleural effusion. Narrative:  EXAM: CTA CHEST W OR W WO CONT       INDICATION: Atrial fibrillation. r/o PE       COMPARISON: None. CONTRAST: 100 mL of Isovue-370. TECHNIQUE:    Precontrast  images were obtained to localize the volume for acquisition. Multislice helical CT arteriography was performed from the diaphragm to the   thoracic inlet during uneventful rapid bolus intravenous contrast   administration. Lung and soft tissue windows were generated. Coronal and   sagittal images were generated and 3D post processing consisting of coronal   maximum intensity images was performed. CT dose reduction was achieved through   use of a standardized protocol tailored for this examination and automatic   exposure control for dose modulation. FINDINGS:   The lungs are clear of mass, nodule, airspace disease or edema. There is a small   right pleural effusion. The pulmonary arteries are well enhanced and no pulmonary emboli are identified.        There is no mediastinal or hilar adenopathy or mass. The aorta enhances normally   without evidence of aneurysm or dissection. Minimal cardiomegaly. The visualized portions of the upper abdominal organs are remarkable for a left   hepatic 1 cm cyst with density measurement of 12.3 (axial image 36). CXR Results  (Last 48 hours)               10/02/21 0801  XR CHEST PORT Final result    Impression:  No acute cardiopulmonary process seen       Narrative:  EXAM: XR CHEST PORT       INDICATION: Chest Pain       COMPARISON: 7/28/2020       FINDINGS: A portable AP radiograph of the chest was obtained at 0757 hours. The   patient is on a cardiac monitor. The lungs are clear. The cardiac and   mediastinal contours and pulmonary vascularity are normal.  The bones and soft   tissues are grossly within normal limits. Medical Decision Making   I am the first provider for this patient. I reviewed the vital signs, available nursing notes, past medical history, past surgical history, family history and social history. Vital Signs-Reviewed the patient's vital signs. Patient Vitals for the past 12 hrs:   Temp Pulse Resp BP SpO2   10/02/21 0820  86 20 113/64 98 %   10/02/21 0815  88 17 120/74 97 %   10/02/21 0800  (!) 107 18 121/73 95 %   10/02/21 0755  99 18 127/75 96 %   10/02/21 0740  (!) 108 18 95/61 98 %   10/02/21 0729  91 18 (!) 91/51 98 %   10/02/21 0726  (!) 102 24 (!) 83/55 96 %   10/02/21 0724  81 21  100 %   10/02/21 0723  (!) 158 18  98 %   10/02/21 0717  (!) 149 23  100 %   10/02/21 0715  (!) 151 24 (!) 122/104 100 %   10/02/21 0700 98.7 °F (37.1 °C) (!) 120 20 123/77 99 %       Records Reviewed: Nursing Notes and Old Medical Records    Provider Notes (Medical Decision Making):   Patient presents with CP. DDx:  ACS, Aortic dissection, PNA, PE, PTX, pericarditis, myocarditis, GERD, costochondritis, anxiety.   Concerned for afib with rvr and acs given the HPI and Physical exam. Will obtain labs, CXR, EKG and get Cardiology Consult       ED Course:   Initial assessment performed. The patients presenting problems have been discussed, and they are in agreement with the care plan formulated and outlined with them. I have encouraged them to ask questions as they arise throughout their visit. ED Course as of Oct 02 1344   Sat Oct 02, 2021   0709 EKG interpreted by me. Shows atrial fibrillation with rapid ventricular response at a rate of 157. No ST elevations depressions concerning for ischemia. [JS]   0725 A minute and a half after IV was placed, patient started to get very diaphoretic stating that he did not feel good and was lightheaded. It looks like his heart rate according to the nurse that he converted, however his heart rate went from the 150s down to 90s down to 80s down to his 60s. When I walked into the room, patient was somnolent not very responsive heart heart rates in the low 50s. I sternal rub him and laid him down and asked for pads to be placed as well as atropine ordered, however patient was responsive, and woke up. As he came to, his heart rate improved. Heart rate now in the 90s and low 100s and patient states that he actually feels better in terms of the chest pain. Still waiting on the POC troponin. Patient still diaphoretic and pale appearing. A liter of normal saline ordered, aspirin given, nitro held given that his pain is better. Blood pressures were initially 150s and then 90s. We will give him a small touch of diltiazem if his blood pressure can hold it. [JS]   [id number] Patient states that he is chest pain is much better. His color is returning and less diaphoretic. Blood pressures on the soft side so changed to amiodarone. [JS]   [id number] After the amiodarone, patient did convert to normal sinus rhythm with a heart rate of 87. [JS]   [id number] Subsequent EKG interpreted by me. Shows normal sinus rhythm with a heart rate of 87.   No ST ovation depressions concerning for ischemia. [JS]   [id number] Spoke with Dr. Gurpreet Pritchard, cards. Start metop 25mg BID and lovenox. Patient now also complaining of chest pain worse with deep breath. He states that he did have a superficial clot in his right leg but was not put on blood thinners then. Given the story, will get CTA to make sure that his syncopal episode and symptoms are not because of a PE.    [JS]   6252 Updated patient and patient's wife who is now in the room that CTA does not appear to show any large clot however might have a small 1 still waiting on the read. Troponins did come back negative. Patient still having some chest pain worse with inspiration but on both sides of the chest.  We will see if there is any pneumonia there either. Lovenox and fentanyl ordered. [JS]      ED Course User Index  [JS] Michel Armendariz MD     Critical Care Time:   CRITICAL CARE NOTE :    7:40 AM    IMPENDING DETERIORATION -Airway, Respiratory and Cardiovascular  ASSOCIATED RISK FACTORS - Hypotension, Shock and Dysrhythmia  MANAGEMENT- Bedside Assessment and Supervision of Care  INTERPRETATION -  Xrays, ECG, Blood Pressure and Cardiac Output Measures   INTERVENTIONS - hemodynamic mngmt  CASE REVIEW - Hospitalist/Intensivist, Medical Sub-Specialist, Nursing and Family  TREATMENT RESPONSE -Improved  PERFORMED BY - Self    NOTES   :    I have spent 60 minutes of critical care time involved in lab review, consultations with specialist, family decision- making, bedside attention and documentation. During this entire length of time I was immediately available to the patient . Disposition:    Admission Note:  Patient is being admitted to the hospital by Dr. Edgardo Willson, Service: Hospitalist.  The results of their tests and reasons for their admission have been discussed with them and available family. They convey agreement and understanding for the need to be admitted and for their admission diagnosis.        Diagnosis Clinical Impression:   1. Atrial fibrillation with rapid ventricular response ([address]    2. Syncope and collapse    3. Vaso vagal episode    4. Acute chest pain        Attestations:  Eder Roberts M.D. Please note that this dictation was completed with [email or web address], the computer voice recognition software. Quite often unanticipated grammatical, syntax, homophones, and other interpretive errors are inadvertently transcribed by the computer software. Please disregard these errors. Please excuse any errors that have escaped final proofreading. Thank you.

## 2021-10-02 NOTE — Clinical Note
Status[de-identified] INPATIENT [101]   Type of Bed: Stepdown [17]   Cardiac Monitoring Required?: Yes   Inpatient Hospitalization Certified Necessary for the Following Reasons: 3.  Patient receiving treatment that can only be provided in an inpatient setting (further clarification in H&P documentation)   Admitting Diagnosis: Atrial fibrillation with RVR [facility]) [9891100]   Admitting Diagnosis: Syncope [387072]   Admitting Diagnosis: Chest pain [[id number]]   Admitting Diagnosis: Dyspnea [201001]   Admitting Diagnosis: Vasovagal syncope [882050]   Admitting Physician: Simone Polo [[id number]]   Attending Physician: Simone Polo [[id number]]   Estimated Length of Stay: 2 Midnights   Discharge Plan[de-identified] Home with Office Follow-up

## 2021-10-03 LAB
ANION GAP SERPL CALC-SCNC: 5 MMOL/L (ref 5–15)
APPEARANCE UR: CLEAR
BACTERIA URNS QL MICRO: NEGATIVE /HPF
BILIRUB UR QL: NEGATIVE
BUN SERPL-MCNC: 13 MG/DL (ref 6–20)
BUN/CREAT SERPL: 12 (ref 12–20)
CALCIUM SERPL-MCNC: 8.7 MG/DL (ref 8.5–10.1)
CHLORIDE SERPL-SCNC: 105 MMOL/L (ref 97–108)
CO2 SERPL-SCNC: 28 MMOL/L (ref 21–32)
COLOR UR: NORMAL
CREAT SERPL-MCNC: 1.05 MG/DL (ref 0.7–1.3)
ECHO AO ROOT DIAM: 3.18 CM
ECHO AV AREA PEAK VELOCITY: 2.49 CM2
ECHO AV AREA/BSA PEAK VELOCITY: 1.1 CM2/M2
ECHO AV PEAK GRADIENT: 5.99 MMHG
ECHO AV PEAK VELOCITY: 122.38 CM/S
ECHO EST RA PRESSURE: 3 MMHG
ECHO LA AREA 4C: 13.74 CM2
ECHO LA MAJOR AXIS: 3.7 CM
ECHO LA MINOR AXIS: 1.64 CM
ECHO LA VOL 4C: 32.11 ML (ref 18–58)
ECHO LA VOLUME INDEX A4C: 14.21 ML/M2 (ref 16–28)
ECHO LV E' LATERAL VELOCITY: 8.27 CM/S
ECHO LV E' SEPTAL VELOCITY: 8.31 CM/S
ECHO LV EDV A2C: 72.14 ML
ECHO LV EDV A4C: 85.49 ML
ECHO LV EDV BP: 79.3 ML (ref 67–155)
ECHO LV EDV INDEX A4C: 37.8 ML/M2
ECHO LV EDV INDEX BP: 35.1 ML/M2
ECHO LV EDV NDEX A2C: 31.9 ML/M2
ECHO LV EJECTION FRACTION A2C: 47 PERCENT
ECHO LV EJECTION FRACTION A4C: 32 PERCENT
ECHO LV EJECTION FRACTION BIPLANE: 38.1 PERCENT (ref 55–100)
ECHO LV ESV A2C: 38.07 ML
ECHO LV ESV A4C: 57.74 ML
ECHO LV ESV BP: 49.05 ML (ref 22–58)
ECHO LV ESV INDEX A2C: 16.8 ML/M2
ECHO LV ESV INDEX A4C: 25.5 ML/M2
ECHO LV ESV INDEX BP: 21.7 ML/M2
ECHO LV INTERNAL DIMENSION DIASTOLIC: 4.46 CM (ref 4.2–5.9)
ECHO LV INTERNAL DIMENSION SYSTOLIC: 2.96 CM
ECHO LV IVSD: 0.82 CM (ref 0.6–1)
ECHO LV MASS 2D: 105.6 G (ref 88–224)
ECHO LV MASS INDEX 2D: 46.7 G/M2 (ref 49–115)
ECHO LV POSTERIOR WALL DIASTOLIC: 0.71 CM (ref 0.6–1)
ECHO LVOT DIAM: 1.99 CM
ECHO LVOT PEAK GRADIENT: 3.87 MMHG
ECHO LVOT PEAK VELOCITY: 98.37 CM/S
ECHO MV A VELOCITY: 80.13 CM/S
ECHO MV E DECELERATION TIME (DT): 274.58 MS
ECHO MV E VELOCITY: 113.79 CM/S
ECHO MV E/A RATIO: 1.42
ECHO MV E/E' LATERAL: 13.76
ECHO MV E/E' RATIO (AVERAGED): 13.73
ECHO MV E/E' SEPTAL: 13.69
ECHO MV REGURGITANT VTIA: 107.4 CM
ECHO PV PEAK INSTANTANEOUS GRADIENT SYSTOLIC: 6.88 MMHG
ECHO RA AREA 4C: 13.01 CM2
ECHO RIGHT VENTRICULAR SYSTOLIC PRESSURE (RVSP): 32.72 MMHG
ECHO TV MEAN GRADIENT: 68.53 MMHG
ECHO TV REGURGITANT MAX VELOCITY: 271.39 CM/S
ECHO TV REGURGITANT MAX VELOCITY: 486.88 CM/S
ECHO TV REGURGITANT PEAK GRADIENT: 29.72 MMHG
EPITH CASTS URNS QL MICRO: NORMAL /LPF
ERYTHROCYTE [DISTWIDTH] IN BLOOD BY AUTOMATED COUNT: 12.3 % (ref 11.5–14.5)
GLUCOSE SERPL-MCNC: 108 MG/DL (ref 65–100)
GLUCOSE UR STRIP.AUTO-MCNC: NEGATIVE MG/DL
HCT VFR BLD AUTO: 41.8 % (ref 36.6–50.3)
HGB BLD-MCNC: 13.9 G/DL (ref 12.1–17)
HGB UR QL STRIP: NEGATIVE
KETONES UR QL STRIP.AUTO: NEGATIVE MG/DL
LEUKOCYTE ESTERASE UR QL STRIP.AUTO: NEGATIVE
MCH RBC QN AUTO: 30.6 PG (ref 26–34)
MCHC RBC AUTO-ENTMCNC: 33.3 G/DL (ref 30–36.5)
MCV RBC AUTO: 92.1 FL (ref 80–99)
NITRITE UR QL STRIP.AUTO: NEGATIVE
NRBC # BLD: 0 K/UL (ref 0–0.01)
NRBC BLD-RTO: 0 PER 100 WBC
PH UR STRIP: 6 [PH] (ref 5–8)
PLATELET # BLD AUTO: 224 K/UL (ref 150–400)
PMV BLD AUTO: 9.8 FL (ref 8.9–12.9)
POTASSIUM SERPL-SCNC: 4.3 MMOL/L (ref 3.5–5.1)
PROT UR STRIP-MCNC: NEGATIVE MG/DL
RBC # BLD AUTO: 4.54 M/UL (ref 4.1–5.7)
RBC #/AREA URNS HPF: NORMAL /HPF (ref 0–5)
SODIUM SERPL-SCNC: 138 MMOL/L (ref 136–145)
SP GR UR REFRACTOMETRY: 1.01 (ref 1–1.03)
TSH SERPL DL<=0.05 MIU/L-ACNC: 0.82 UIU/ML (ref 0.36–3.74)
UA: UC IF INDICATED,UAUC: NORMAL
UROBILINOGEN UR QL STRIP.AUTO: 0.2 EU/DL (ref 0.2–1)
WBC # BLD AUTO: 8.6 K/UL (ref 4.1–11.1)
WBC URNS QL MICRO: NORMAL /HPF (ref 0–4)

## 2021-10-03 PROCEDURE — 85027 COMPLETE CBC AUTOMATED: CPT

## 2021-10-03 PROCEDURE — 99218 HC RM OBSERVATION: CPT

## 2021-10-03 PROCEDURE — 81001 URINALYSIS AUTO W/SCOPE: CPT

## 2021-10-03 PROCEDURE — 36415 COLL VENOUS BLD VENIPUNCTURE: CPT

## 2021-10-03 PROCEDURE — 80048 BASIC METABOLIC PNL TOTAL CA: CPT

## 2021-10-03 PROCEDURE — 74011250636 HC RX REV CODE- 250/636: Performed by: HOSPITALIST

## 2021-10-03 PROCEDURE — 74011250637 HC RX REV CODE- 250/637: Performed by: INTERNAL MEDICINE

## 2021-10-03 PROCEDURE — 84443 ASSAY THYROID STIM HORMONE: CPT

## 2021-10-03 PROCEDURE — 65270000029 HC RM PRIVATE

## 2021-10-03 PROCEDURE — 74011250637 HC RX REV CODE- 250/637: Performed by: HOSPITALIST

## 2021-10-03 PROCEDURE — 74011250636 HC RX REV CODE- 250/636: Performed by: STUDENT IN AN ORGANIZED HEALTH CARE EDUCATION/TRAINING PROGRAM

## 2021-10-03 PROCEDURE — 74011250637 HC RX REV CODE- 250/637: Performed by: STUDENT IN AN ORGANIZED HEALTH CARE EDUCATION/TRAINING PROGRAM

## 2021-10-03 PROCEDURE — 87040 BLOOD CULTURE FOR BACTERIA: CPT

## 2021-10-03 RX ORDER — AMIODARONE HYDROCHLORIDE 200 MG/1
400 TABLET ORAL 2 TIMES DAILY
Status: DISCONTINUED | OUTPATIENT
Start: 2021-10-03 | End: 2021-10-05

## 2021-10-03 RX ADMIN — Medication 10 ML: at 22:05

## 2021-10-03 RX ADMIN — ENOXAPARIN SODIUM 100 MG: 100 INJECTION SUBCUTANEOUS at 08:02

## 2021-10-03 RX ADMIN — APIXABAN 5 MG: 5 TABLET, FILM COATED ORAL at 22:05

## 2021-10-03 RX ADMIN — METOPROLOL TARTRATE 25 MG: 25 TABLET, FILM COATED ORAL at 22:05

## 2021-10-03 RX ADMIN — METOPROLOL TARTRATE 25 MG: 25 TABLET, FILM COATED ORAL at 08:02

## 2021-10-03 RX ADMIN — AMIODARONE HYDROCHLORIDE 150 MG: 1.5 INJECTION, SOLUTION INTRAVENOUS at 15:22

## 2021-10-03 RX ADMIN — ASPIRIN 81 MG: 81 TABLET, COATED ORAL at 08:02

## 2021-10-03 RX ADMIN — Medication 10 ML: at 14:36

## 2021-10-03 RX ADMIN — AMIODARONE HYDROCHLORIDE 400 MG: 200 TABLET ORAL at 17:37

## 2021-10-03 RX ADMIN — DRONEDARONE 400 MG: 400 TABLET, FILM COATED ORAL at 10:14

## 2021-10-03 NOTE — PROGRESS NOTES
0730 - initial shift assessment completed, AxO X4 - denies chest pain, resting in bed with NSR with frequent PVC  0845 - HR A.fib rate 140-150's, Dr. Pamela Molina at bedside, orders recved for Amiodarone bolus and gtt.   0900 - Spontaneously broke A.fib - NRS amiodarone gtt d/c po order for Multaq - message sent to pharmacy for same. 1200 - remains oob in chair, no complaints, HR remains sinus with frequent PVC. 1450 - A.fib 140-160, sitting in chair, asymptomatic BP stable. Notified Dr. Pamela Molina, orders received to give amiodarone bolus if persistent longer then 15 min. 1505 - remains in A-fib, and asymptomatic, amio bolus ordered. Informed pt on plan of care, verbalized understanding of same. 1525 - Amio bolus started, paroxsymal A. Fib notes vs sinus tach. Educated pt on medication. 1700 - ambulating in room with out issues, NSR with PVC  1900 - shift change report given to IFTIKHAR Cho RN.

## 2021-10-03 NOTE — PROGRESS NOTES
Problem: Falls - Risk of  Goal: *Absence of Falls  Description: Document Dwain Mullen Fall Risk and appropriate interventions in the flowsheet.   10/3/2021 0403 by Kayli Cadena RN  Outcome: Progressing Towards Goal  Note: Fall Risk Interventions:                             10/2/2021 1951 by Kayli Cadena RN  Outcome: Progressing Towards Goal  Note: Fall Risk Interventions:

## 2021-10-03 NOTE — PROGRESS NOTES
Progress Note      10/3/2021 8:45 AM  NAME: Haley Colon   MRN:  [id number]   Admit Diagnosis: Atrial fibrillation with RVR ([address] [I48.91]  Syncope [R55]  Chest pain [R07.9]  Dyspnea [R06.00]  Vasovagal syncope [R55]  Pleural effusion [J90]    _________________________________________________________________      Update: Patient converted to NSR within 10-15 minutes, even before Amiodarone started for recurrent afib. Patient wants to go home if possible later today. Planned to discharge on Multaq and Apixaban    However, later he has recurrent episode of Afib with RVR, required Amiodarone bolus and after that he converted to normal sinus rhythm. Will start amiodarone 400 mg po BID  Cont apixaban   Cont metoprolol   Continue to monitor on tele     Dr Che Pi will follow up with patient, will also consult EP.       _____________________________________________________________________  Date of admission: 10/2/2021  Primary Cardiologist: Dr Chinedu Samaniego   Physician Requesting consult: Dr Gómez Martin         Assessment:     1. Paroxysmal Afib with RVR (h/o Afib post op and now has recurrent episodes)   2. MV disease s/p MVR in 7/2020   3. Atypical chest pain   - Cath in 1/2020 showed no CAD   4. GERD  5. 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 Negative MPI 2008; No CAD @ cath 1/2020.  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 *Worse MEDINA 9/2019: Severe MR w/ fail segment; No CAD @ cath:    *MV repair (P2 resection w/ #38 annuloplasty band via mini R thoracotomy; Dr Seay Drivers) 7/2020 (delayed due to covid). *Echo 10/2020: EF 50-55%; good repair w/ no MS/MR     3) PAfib: post op 7/2020: spont NSR; Rx w amio/eliquis; Palps after but no documented Afib; Amio stopped; cont on dilt/bblocker. *NSR 9/2020: stopped eliquis/dilt; 5 beats (? Sx) NSVT, no afib: Seen by EP: \"findings don't suggests an increased risk; no further testing needed for the nonsustained VT episode. \" Weaned off metoprolol.    4) GERD  5) Obesity; weight down to 170# 2016; 233# 2019. 228# to 233 # 2020. Rare ethanol. No nicotine. 4-5 caff margy/day to 1/d. Previous salt in his diet. He works as a  & often has to lift heavy objects.        Mr Lynne Fajardo presented with CP, chest tightness worse with deep breathing, sob, palpitations, noted to have Afib with RVR with HR 150s  - In ER immediately after he had IV access- had episode of bradycardia with HR 50s-hypotension - diaphoresis - near syncope - likely vegal episodes. Converted to NSR after given Amio x1. He reported to have similar episode 1 week ago.      He restarted episode of afib with RVR this am.         Recommendations:     1. Converted to NSR after dose of Amio in ER but then this morning has recurrent episode- will be started on Amiodarone again and hopefully will convert to NSR  - will avoid amiodarone for long term   2. On AC with lovenox - discharge on Apixaban   3. Continue metoprolol - if BP allows    4.  CP is atypical - likely in the setting of Afib with RVR, CE negative    Will consult EP for evaluation for ablation or AAD        Thank you for this consult and allowing me to take part in this patients care. Please call with questions.     Dr Wilmar Lemos to see patient tomorrow, will discuss with Dr Gamaliel King about Afib with RVR           [x]? High complexity decision making was performed            Subjective:     HPI:    this morning went into Afib with RVR again with HR 160s, -110s. Feels palpitations and chest pain coming back on   Will start amiodarone again and hopefully will convert to NSR     ROS: No   Abd pain, nausea, vomiting, syncope,  new focal neurological symptoms     Objective:      Physical Exam:    Last 24hrs VS reviewed since prior progress note.  Most recent are:    Visit Vitals  /62   Pulse 88   Temp 98.8 °F (37.1 °C)   Resp 16   Ht 6' (1.829 m)   Wt 104.3 kg (229 lb 15 oz)   SpO2 96%   BMI 31.19 kg/m²     No intake or output data in the 24 hours ending 10/03/21 0845        General: Alert and oriented x3, no acute distress   Neck: Supple   Respiratory: No respiratory distress, clear lung sound   Cardiovascular: Irregular rate rhythm, S1S2, no murmur   Abdomen: soft, non tender, non distended   Neuro: moves all extremities, oriented x3   Skin: warm and dry   Extremity: no edema, warm to touch          Telemetry: Afib with RVR       Lab Data Personally Reviewed:    Recent Labs     10/03/21  0346 10/02/21  0718   WBC 8.6 9.6   HGB 13.9 16.2   HCT 41.8 46.3    251     No results for input(s): INR, PTP, APTT, INREXT in the last 72 hours. Recent Labs     10/03/21  0346 10/02/21  0741    137   K 4.3 4.0    105   CO2 28 28   BUN 13 14   CREA 1.05 1.19   * 130*   CA 8.7 9.0   MG  --  2.1     Recent Labs     10/02/21  1640 10/02/21  1106 10/02/21  0741   TROIQ <0.05 <0.05 <0.05     No results found for: CHOL, CHOLX, CHLST, CHOLV, HDL, HDLP, LDL, LDLC, DLDLP, TGLX, TRIGL, TRIGP, CHHD, CHHDX    Recent Labs     10/02/21  0741   AP 83   TP 7.3   ALB 3.1*   GLOB 4.2*     No results for input(s): PH, PCO2, PO2 in the last 72 hours.     Medications Personally Reviewed:    Current Facility-Administered Medications   Medication Dose Route Frequency    sodium chloride (NS) flush 5-40 mL  5-40 mL IntraVENous Q8H    sodium chloride (NS) flush 5-40 mL  5-40 mL IntraVENous PRN    acetaminophen (TYLENOL) tablet 650 mg  650 mg Oral Q6H PRN    Or    acetaminophen (TYLENOL) suppository 650 mg  650 mg Rectal Q6H PRN    polyethylene glycol (MIRALAX) packet 17 g  17 g Oral DAILY PRN    ondansetron (ZOFRAN ODT) tablet 4 mg  4 mg Oral Q8H PRN    Or    ondansetron (ZOFRAN) injection 4 mg  4 mg IntraVENous Q6H PRN    enoxaparin (LOVENOX) injection 100 mg  1 mg/kg SubCUTAneous Q12H    metoprolol tartrate (LOPRESSOR) tablet 25 mg  25 mg Oral Q12H    ketorolac (TORADOL) injection 30 mg  30 mg IntraVENous Q6H PRN    fentaNYL citrate (PF) injection 25 mcg  25 mcg IntraVENous Q6H PRN    naloxone (NARCAN) syringe 0.4 mg  0.4 mg IntraVENous PRN    aspirin delayed-release tablet 81 mg  81 mg Oral DAILY              Shaneka Laurent MD

## 2021-10-04 LAB
ANION GAP SERPL CALC-SCNC: 2 MMOL/L (ref 5–15)
ATRIAL RATE: 220 BPM
ATRIAL RATE: 87 BPM
BUN SERPL-MCNC: 15 MG/DL (ref 6–20)
BUN/CREAT SERPL: 12 (ref 12–20)
CALCIUM SERPL-MCNC: 8.6 MG/DL (ref 8.5–10.1)
CALCULATED P AXIS, ECG09: 68 DEGREES
CALCULATED R AXIS, ECG10: 17 DEGREES
CALCULATED R AXIS, ECG10: 4 DEGREES
CALCULATED T AXIS, ECG11: 27 DEGREES
CALCULATED T AXIS, ECG11: 3 DEGREES
CHLORIDE SERPL-SCNC: 106 MMOL/L (ref 97–108)
CO2 SERPL-SCNC: 30 MMOL/L (ref 21–32)
CREAT SERPL-MCNC: 1.21 MG/DL (ref 0.7–1.3)
DIAGNOSIS, 93000: NORMAL
DIAGNOSIS, 93000: NORMAL
ERYTHROCYTE [DISTWIDTH] IN BLOOD BY AUTOMATED COUNT: 12.2 % (ref 11.5–14.5)
GLUCOSE SERPL-MCNC: 90 MG/DL (ref 65–100)
HCT VFR BLD AUTO: 41.9 % (ref 36.6–50.3)
HGB BLD-MCNC: 13.9 G/DL (ref 12.1–17)
MCH RBC QN AUTO: 30.6 PG (ref 26–34)
MCHC RBC AUTO-ENTMCNC: 33.2 G/DL (ref 30–36.5)
MCV RBC AUTO: 92.3 FL (ref 80–99)
NRBC # BLD: 0 K/UL (ref 0–0.01)
NRBC BLD-RTO: 0 PER 100 WBC
P-R INTERVAL, ECG05: 146 MS
PLATELET # BLD AUTO: 235 K/UL (ref 150–400)
PMV BLD AUTO: 9.8 FL (ref 8.9–12.9)
POTASSIUM SERPL-SCNC: 4.3 MMOL/L (ref 3.5–5.1)
Q-T INTERVAL, ECG07: 296 MS
Q-T INTERVAL, ECG07: 376 MS
QRS DURATION, ECG06: 102 MS
QRS DURATION, ECG06: 108 MS
QTC CALCULATION (BEZET), ECG08: 452 MS
QTC CALCULATION (BEZET), ECG08: 478 MS
RBC # BLD AUTO: 4.54 M/UL (ref 4.1–5.7)
SODIUM SERPL-SCNC: 138 MMOL/L (ref 136–145)
VENTRICULAR RATE, ECG03: 157 BPM
VENTRICULAR RATE, ECG03: 87 BPM
WBC # BLD AUTO: 8.4 K/UL (ref 4.1–11.1)

## 2021-10-04 PROCEDURE — 80048 BASIC METABOLIC PNL TOTAL CA: CPT

## 2021-10-04 PROCEDURE — 36415 COLL VENOUS BLD VENIPUNCTURE: CPT

## 2021-10-04 PROCEDURE — 2709999900 HC NON-CHARGEABLE SUPPLY

## 2021-10-04 PROCEDURE — 74011250637 HC RX REV CODE- 250/637: Performed by: HOSPITALIST

## 2021-10-04 PROCEDURE — 94760 N-INVAS EAR/PLS OXIMETRY 1: CPT

## 2021-10-04 PROCEDURE — 74011250637 HC RX REV CODE- 250/637: Performed by: INTERNAL MEDICINE

## 2021-10-04 PROCEDURE — 74011250637 HC RX REV CODE- 250/637: Performed by: STUDENT IN AN ORGANIZED HEALTH CARE EDUCATION/TRAINING PROGRAM

## 2021-10-04 PROCEDURE — 65270000029 HC RM PRIVATE

## 2021-10-04 PROCEDURE — 85027 COMPLETE CBC AUTOMATED: CPT

## 2021-10-04 PROCEDURE — 74011250636 HC RX REV CODE- 250/636: Performed by: INTERNAL MEDICINE

## 2021-10-04 RX ORDER — METOPROLOL SUCCINATE 25 MG/1
12.5 TABLET, EXTENDED RELEASE ORAL ONCE
Status: COMPLETED | OUTPATIENT
Start: 2021-10-04 | End: 2021-10-04

## 2021-10-04 RX ORDER — METOPROLOL SUCCINATE 25 MG/1
37.5 TABLET, EXTENDED RELEASE ORAL DAILY
Status: DISCONTINUED | OUTPATIENT
Start: 2021-10-04 | End: 2021-10-05 | Stop reason: HOSPADM

## 2021-10-04 RX ORDER — METOPROLOL SUCCINATE 25 MG/1
25 TABLET, EXTENDED RELEASE ORAL DAILY
Status: DISCONTINUED | OUTPATIENT
Start: 2021-10-04 | End: 2021-10-04

## 2021-10-04 RX ADMIN — AMIODARONE HYDROCHLORIDE 400 MG: 200 TABLET ORAL at 08:15

## 2021-10-04 RX ADMIN — ASPIRIN 81 MG: 81 TABLET, COATED ORAL at 08:15

## 2021-10-04 RX ADMIN — Medication 10 ML: at 08:17

## 2021-10-04 RX ADMIN — APIXABAN 5 MG: 5 TABLET, FILM COATED ORAL at 21:44

## 2021-10-04 RX ADMIN — METOPROLOL TARTRATE 25 MG: 25 TABLET, FILM COATED ORAL at 08:15

## 2021-10-04 RX ADMIN — Medication 10 ML: at 17:49

## 2021-10-04 RX ADMIN — AMIODARONE HYDROCHLORIDE 400 MG: 200 TABLET ORAL at 17:48

## 2021-10-04 RX ADMIN — APIXABAN 5 MG: 5 TABLET, FILM COATED ORAL at 08:15

## 2021-10-04 RX ADMIN — AMIODARONE HYDROCHLORIDE 150 MG: 1.5 INJECTION, SOLUTION INTRAVENOUS at 09:52

## 2021-10-04 RX ADMIN — Medication 10 ML: at 21:45

## 2021-10-04 RX ADMIN — METOPROLOL SUCCINATE 12.5 MG: 25 TABLET, EXTENDED RELEASE ORAL at 09:52

## 2021-10-04 NOTE — PROGRESS NOTES
0830-Pt converted to A fib RVR, HR in the 120's-140's MD aware  0940-Pt self converted back to NSR  0952- Administered 150 mg IV bolus of amiodarone and 12.5 mg PO metoprolol per MD orders

## 2021-10-04 NOTE — PROGRESS NOTES
Problem: Falls - Risk of  Goal: *Absence of Falls  Description: Document Ellen Millan Fall Risk and appropriate interventions in the flowsheet.   Outcome: Progressing Towards Goal  Note: Fall Risk Interventions:

## 2021-10-04 NOTE — CONSULTS
EP/ ARRHYTHMIA CONSULT requested by Dr. Ronan Mojica secondary to atrial fibrillation    Patient ID:  Patient: Cm Steve  MRN: [id number]  Age: 46 y.o.  : 1970    Date of  Admission: 10/2/2021  7:07 AM   PCP:  Almaz Palencia MD   Usual cardiologist:  Benjamin Sanabria MD    Assessment: 1. Atrial fibrillation, paroxysmal and symptomatic. Started on amiodarone. 2. Vasovagal episode (mixed mechanism of vasodilation and cardioinhibition) with brief syncope in the ER. Due to PIV placement. 3. Chest pain here with Afib but with no CAD on cath 2020.  4. Mitral valve repair by Dr. Jennifer Ag due to severe MR in 2020. The LOREN was not ligated/obliterated per the op note so there still is a need for anticoagulation for Afib. 5. Left GSV thrombosis 2021.  6. Full code. Plan:     1. Continue amiodarone load. He's going in an out of Afib as of this AM, quite tachycardic and I don't think he'll be rate controlled easily. I'll bolus him 150 mg IV now. 2. Continue anticoagulation. 3. Continue metoprolol ER 25 mg daily. 4. Discussed the option of atrial fibrillation ablation in the future which is his best move in my opinion compared to lifelong antiarrhythmic therapy. He wants to talk with me in the office again after thinking about it. 5. Dr. Ophelia Bay will manage general cardiology care otherwise--see his note. All questions answered for him. [x]       High complexity decision making was performed in this patient at high risk for decompensation with multiple organ involvement. Cm Steve is a 46 y.o. male with a history of the above. I was asked to consult for atrial fibrillation evaluation and management.     Per the hospitalist:  Jefferson Murcia is a 46 y.o. male with past medical history of severe mitral regurgitation who underwent mitral valve repair 2020 by Dr. Jennifer Ag, hx of postop afib (placed on amiodarone, diltiazem, metoprolol, eliquis at discharge 7/20), now just on ASA 81mg, presents to ER with chest pain.     He reports severe chest pain started last Sunday 9/26 a lot of chest pain while at [employer], thought from heavy lifting the day before (lifted cast iron pot belly stove). He took tylenol and left over oxycodone from surgery and pain resolved. He was chest pain free for whole week.     Last night had pain in chest when sleeping on his side, made him have to lay on his back. Today while walking at [employer], developed severe pain in center of chest 10/10, sharp and pressure, more sharp with inspiration. Radiate into left chest and shoulder and then into right chest.   Pain improved with fentanyl in ER. Now only have sharp pain with inspiration 5/10. Chest pain is associated with SOB and +palpitations last Sunday and again today. No n/v or sweats.       Unvaccinated for covid-19. Denies fever, chills, lost of taste or smell, diarrhea, headache, myalgias.     Patient noted to be in afib with RVR on arrival HR in 140-150s. About 1.5 minutes after IV placement, ER nurse noted  \"pt's heart rate dropped from 166 to 60's. Pt stated he didn't feel well and pt became diaphoretic and pt became unresponsive. \" MD Dorothy Salmeron at bedside. Pt heart rate dropped to the 50's. Placed pt on pads and pt started to become more responsive. He then became more tachycardic still in afib in 100s with low BP 83/55 so was given IVF and amiodarone bolus 150mg. He then converted into SR. Wife reports he had similar syncopal episode when got him up after MV repair and took out IVs for discharge where he became diaphoretic, dizzy and passed out and was in afib with rvr at that time. He also report they had difficulty regulating blood sugars postop.   Patient was taken off amiodarone, diltiazem, metoprolol and eliquis about 1 month after discharge 7/2020 by his cardiologist, after wearing Holter monitor by his report.     Reports he was diagnosed with thrombus in left leg clot in left great saphenous vein in 2021 after waking up with left foot and ankle swelling. Did not require anticoagulation and swelling resolved after 1 month. No other recent illness. \"      Past Medical History:   Diagnosis Date    GERD (gastroesophageal reflux disease)     Mitral valve problem 2020    severe mitral regurgitation    Paroxysmal A-fib ([address] 10/02/2021    and post mitral valve repair 2020    Superficial vein thrombosis 2021    left greater saphenous vein        Past Surgical History:   Procedure Laterality Date    HX HEART CATHETERIZATION  2020    HX HEENT      wisdom teeth    HX ORTHOPAEDIC      L arm tendon repair/pinning    HX OTHER SURGICAL  2020    mitral valve repair Dr. Almodovar Remedies    HX TONSILLECTOMY         Social History     Tobacco Use    Smoking status: Former Smoker     Quit date: 3/10/1988     Years since quittin.5    Smokeless tobacco: Never Used   Substance Use Topics    Alcohol use: Yes     Comment: very rare        Family History   Problem Relation Age of Onset    Cancer Mother     Diabetes Mother     Diabetes Brother     Headache Maternal Grandfather     Diabetes Maternal Grandfather     Cancer Father         No Known Allergies       Current Facility-Administered Medications   Medication Dose Route Frequency    metoprolol succinate (TOPROL-XL) XL tablet 25 mg  25 mg Oral DAILY    apixaban (ELIQUIS) tablet 5 mg  5 mg Oral Q12H    amiodarone (CORDARONE) tablet 400 mg  400 mg Oral BID    sodium chloride (NS) flush 5-40 mL  5-40 mL IntraVENous Q8H    sodium chloride (NS) flush 5-40 mL  5-40 mL IntraVENous PRN    acetaminophen (TYLENOL) tablet 650 mg  650 mg Oral Q6H PRN    Or    acetaminophen (TYLENOL) suppository 650 mg  650 mg Rectal Q6H PRN    polyethylene glycol (MIRALAX) packet 17 g  17 g Oral DAILY PRN    ondansetron (ZOFRAN ODT) tablet 4 mg  4 mg Oral Q8H PRN    Or    ondansetron (ZOFRAN) injection 4 mg 4 mg IntraVENous Q6H PRN    ketorolac (TORADOL) injection 30 mg  30 mg IntraVENous Q6H PRN    fentaNYL citrate (PF) injection 25 mcg  25 mcg IntraVENous Q6H PRN    naloxone (NARCAN) syringe 0.4 mg  0.4 mg IntraVENous PRN    aspirin delayed-release tablet 81 mg  81 mg Oral DAILY       Review of Symptoms:   General: negative for fever, chills, sweats, weakness, weight loss   Eyes: negative for blurred vision, eye pain, loss of vision, diplopia   Ear Nose and Throat: negative for rhinorrhea, pharyngitis, otalgia, tinnitus, speech or swallowing difficulties   Respiratory: negative for SOB, cough, sputum production, wheezing, MEDINA, pleuritic pain   Cardiology: +chest pain, syncope, negative for palpitations, orthopnea, PND, edema   Gastrointestinal: negative for abdominal pain, N/V, dysphagia, change in bowel habits, bleeding   Genitourinary: negative for frequency, urgency, dysuria, hematuria, incontinence   Muskuloskeletal : negative for arthralgia, myalgia   Hematology: negative for easy bruising, bleeding, lymphadenopathy   Dermatological: negative for rash, ulceration, mole change, new lesion   Endocrine: negative for hot flashes or polydipsia   Neurological: +dizziness, negative for headache, confusion, focal weakness, paresthesia, memory loss, gait disturbance   Psychological: negative for anxiety, depression, agitation       Objective:      Physical Exam:  Temp (24hrs), Av °F (37.2 °C), Min:98.6 °F (37 °C), Max:99.9 °F (37.7 °C)    Patient Vitals for the past 8 hrs:   Pulse   10/04/21 0735 82   10/04/21 0413 83    Patient Vitals for the past 8 hrs:   Resp   10/04/21 0735 15   10/04/21 0413 16    Patient Vitals for the past 8 hrs:   BP   10/04/21 0735 125/64   10/04/21 0413 100/71          Intake/Output Summary (Last 24 hours) at 10/4/2021 0843  Last data filed at 10/3/2021 1600  Gross per 24 hour   Intake 640 ml   Output 500 ml   Net 140 ml       Nondiaphoretic, not in acute distress.   Supple, no palpable thyromegaly. No scleral icterus, mucous membranes moist, conjuctivae pink, no xanthelasma. Unlabored, clear to auscultation bilaterally, symmetric air movement. Irregular rate and rhythm, no murmur, pericardial rub, knock, or gallop. No JVD or peripheral edema. No carotid bruit. Palpable radial pulses bilaterally. Abdomen, soft, nontender, nondistended. Extremities without cyanosis or clubbing. Muscle tone and bulk normal.  Skin warm and dry. No rashes or ulcers. Neuro grossly nonfocal.  No tremor. Awake and appropriate. CARDIOGRAPHICS and STUDIES, I reviewed:    Telemetry:  SR earlier, now in Afib with RVR. ECG:  Reviewed. Intraoperative JEANIE 7/2020:  \"The transesophageal echocardiographic exam was requested by the surgeon in order to evaluate real-time cardiac and valvular form and function in a patient with known severe mitral regurgitation, scheduled for mitral valve replacement via a right thoracotomy. The transesophageal echocardiographic probe was easily and atraumatically inserted into the patient's esophagus while the patient was sedated inside the operating room under general anesthesia. Modalities incorporated included 2-D, 3-D, color-flow mode, pulsed-wave Doppler, and continuous wave Doppler.     AORTA:  Ascending aorta: The patient's ascending aorta measured 2.6 cm in diameter. There was no evidence of dissection. There was minimal and nonmobile plaque.     Aortic arch: The aortic arch measured 2.1 cm in diameter. There was no evidence of dissection. There was minimal and nonmobile plaque.     Descending aorta: The descending aorta measured 1.8 cm in diameter. There was no evidence of dissection. There was minimal and nonmobile plaque.     VALVES:  Aortic valve: The aortic valve annulus measured 2.3 cm. There was no aortic valve stenosis. There was no significant aortic insufficiency. The aortic leaflet morphology and motion were both normal.     Mitral valve:   The mitral valve annulus measured 4.3 cm. There was no mitral valve stenosis. There was severe anteriorly located mitral insufficiency with a wall-hugging Coanda jet secondary to a P2 prolapse with flail. The anterior leaflet measured 2.8 cm. The posterior leaflet measured 3 cm and the C-Sept distance measured 2.7 cm. The anterior to posterior ratio was 0.9. The anterior and posterior leaflets were both thickened and myxomatous.     Tricuspid valve: The tricuspid valve annulus measured 4.1 cm. There was no tricuspid valve stenosis. There was trace tricuspid insufficiency. The tricuspid leaflet morphology and motion were both normal.     Pulmonic valve: The pulmonic valve annulus was within normal limits. There was no pulmonic stenosis. There was trace pulmonic insufficiency. The pulmonic leaflet morphology and motion were both normal.     ATRIA:  Right atrium:  Right atrial size was 3.8 cm. There was no spontaneous echo contrast.  There was no right atrial thrombus or tumor. A pulmonary artery catheter was visualized.     Left atrium:  The left atrial size was 4.4 cm. There was no spontaneous echo contrast.  There was no left atrial thrombus or tumor. No device was visualized.     Left atrial appendage: There was no thrombus visualized within the left atrial appendage. Pulsed-wave Doppler within the appendage measured 46 cm/sec.     Interatrial septum:  The interatrial septal morphology was normal.  There was no patent foramen ovale with color-flow mode.     VENTRICLES:  Right ventricle: The right ventricular cavity size was slightly dilated. The right ventricular major axis was 6 cm. There was borderline right ventricular hypertrophy. There was no right ventricular thrombus and the right ventricular ejection fraction was normal.     Left ventricle: The left ventricular cavity size was mildly dilated. There was borderline left ventricular hypertrophy.   There was no left ventricular thrombus and the left ventricular ejection fraction was 55%.     Interventricular septum:  The interventricular septum morphology was normal.     REGIONAL FUNCTION:  There were no isolated regional wall motion abnormalities.     PERICARDIUM:  The pericardium was normal.     PLEURAE:  The pleurae were normal.     POST INTERVENTION FOLLOWUP STUDY:  After assistance with catheter placement, the patient was placed on cardiopulmonary bypass. After separation from cardiopulmonary bypass and mitral valve repair with banding there was transient systolic anterior motion of the mitral valve with severe mitral regurgitation. With increasing volume expansion of the left ventricle the systolic anterior motion gradually resolved. There was no longer significant mitral regurgitation. The mean gradient through the mitral valve was 3 mmHg. The maximum velocity through the aortic valve was 87 cm/sec. The peak gradient through the aortic valve was 3 mmHg. The mean gradient through the aortic valve was 2 mmHg. There was no significant tricuspid regurgitation. There was trace pulmonic insufficiency and there was no significant aortic insufficiency. Both the left and right ventricular functions were intact and there were no significant effusions. These results were discussed and viewed with the cardiac surgeon, Dr. Griselda Freitas and Dr. Patricio Cristobal. The transesophageal echocardiographic probe was easily and atraumatically removed from the patient's esophagus. The patient tolerated the procedure without event. \"       Labs:  Recent Labs     10/02/21  1640 10/02/21  1106 10/02/21  0741   TROIQ <0.05 <0.05 <0.05     No results found for: CHOL, CHOLX, CHLST, CHOLV, HDL, HDLP, LDL, LDLC, DLDLP, TGLX, TRIGL, TRIGP, CHHD, CHHDX  No results for input(s): INR, PTP, APTT, INREXT, INREXT in the last 72 hours.    Recent Labs     10/04/21  0411 10/03/21  0346 10/02/21  0741 10/02/21  0718    138 137  --    K 4.3 4.3 4.0  --     105 105  --    CO2 30 28 28  --    BUN 15 13 14  --    CREA 1.21 1.05 1.19  --    GLU 90 108* 130*  --    CA 8.6 8.7 9.0  --    ALB  --   --  3.1*  --    WBC 8.4 8.6  --  9.6   HGB 13.9 13.9  --  16.2   HCT 41.9 41.8  --  46.3    224  --  251     Recent Labs     10/02/21  0741   AP 83   TP 7.3   ALB 3.1*   GLOB 4.2*     No components found for: GLPOC  No results for input(s): PH, PCO2, PO2 in the last 72 hours.         Katya Corrales MD  10/4/2021

## 2021-10-04 NOTE — PROGRESS NOTES
Hospitalist Progress Note    NAME: Haley Colon   :  1970   MRN:  [id number]       Assessment / Plan:  Paroxysmal atrial fibrillation with rapid ventricular rate  Syncope in the emergency department due to suspected vasovagal causes  -We will continue amiodarone, metoprolol and apixaban. Cardiology following.  -Possible EP consultation in a.m.  -Telemetry monitoring    Chest pain pleuritic type of pain  -CT chest shows no evidence of pulmonary embolism  -Troponins are normal  -Chest pain is currently resolved  -Cardiology did not recommend any invasive work-up at this time    Fever rule out infectious etiology  -Had 1 episode of fever of 100.5 but no new symptoms  -Check urine analysis. Check blood cultures. CTA chest showed no evidence of pneumonia. -SARS-CoV-2 negative    S/p mitral valve repair 2020  --continue aspirin 81mg     Hx thrombus in left leg greater saphenous vein 2021, unprovoked  --not placed anticoagulation as it is a superficial vein. Continue aspirin  --left foot/ankle swelling resolved after 1 month  -Now on Eliquis     Body mass index is 31.19 kg/m².     Code: full  DVT prophylaxis: Eliquis  Surrogate decision maker:  Wife Jose Luis Dorsey          Body mass index is 31.19 kg/m². Subjective:     Chief Complaint / Reason for Physician Visit  Chest pain is resolved. No shortness of breath. No cough. No dysuria  Had 1 episode of fever of 100.5      Review of Systems:  Symptom Y/N Comments  Symptom Y/N Comments   Fever/Chills    Chest Pain     Poor Appetite    Edema     Cough    Abdominal Pain     Sputum    Joint Pain     SOB/MEDINA    Pruritis/Rash     Nausea/vomit    Tolerating PT/OT     Diarrhea    Tolerating Diet     Constipation    Other       Could NOT obtain due to:      Objective:     VITALS:   Last 24hrs VS reviewed since prior progress note.  Most recent are:  Patient Vitals for the past 24 hrs:   Temp Pulse Resp BP SpO2   10/03/21 1933 99.3 °F (37.4 °C) 87 16 115/66 96 % 10/03/21 1849 99.9 °F (37.7 °C) 97 16 110/66 97 %   10/03/21 1737  95  108/65    10/03/21 1519 99.2 °F (37.3 °C) (!) 159 16  96 %   10/03/21 1453  (!) 162 14 (!) 104/90 97 %   10/03/21 1200 98.9 °F (37.2 °C) 95 14 (!) 111/59 97 %   10/03/21 0900  99      10/03/21 0845  (!) 154      10/03/21 0800  (!) 104  (!) 108/59    10/03/21 0730 99.1 °F (37.3 °C) 98   98 %   10/03/21 0351 98.8 °F (37.1 °C) 88 16 114/62 96 %   10/02/21 2259 98.5 °F (36.9 °C) 93 18 119/65 99 %       Intake/Output Summary (Last 24 hours) at 10/3/2021 2111  Last data filed at 10/3/2021 1600  Gross per 24 hour   Intake 880 ml   Output 500 ml   Net 380 ml        PHYSICAL EXAM:  General: WD, WN. Alert, cooperative, no acute distress    EENT:  EOMI. Anicteric sclerae. MMM  Resp:  CTA bilaterally, no wheezing or rales. No accessory muscle use  CV:  Regular  rhythm,  No edema  GI:  Soft, Non distended, Non tender.  +Bowel sounds  Neurologic:  Alert and oriented X 3, normal speech,   Psych:   Good insight. Not anxious nor agitated  Skin:  No rashes.   No jaundice    Reviewed most current lab test results and cultures  YES  Reviewed most current radiology test results   YES  Review and summation of old records today    NO  Reviewed patient's current orders and MAR    YES  PMH/SH reviewed - no change compared to H&P          Current Facility-Administered Medications:     apixaban (ELIQUIS) tablet 5 mg, 5 mg, Oral, Q12H, Kaya Draper MD    amiodarone (CORDARONE) tablet 400 mg, 400 mg, Oral, BID, Bekah Garner MD, 400 mg at 10/03/21 1737    sodium chloride (NS) flush 5-40 mL, 5-40 mL, IntraVENous, Q8H, Aiden Iraheta MD, 10 mL at 10/03/21 1436    sodium chloride (NS) flush 5-40 mL, 5-40 mL, IntraVENous, PRN, Aiden Iraheta MD    acetaminophen (TYLENOL) tablet 650 mg, 650 mg, Oral, Q6H PRN **OR** acetaminophen (TYLENOL) suppository 650 mg, 650 mg, Rectal, Q6H PRN, Aiden Iraheta MD    polyethylene glycol [facility]) packet 17 g, 17 g, Oral, DAILY PRN, Emmanuel Kerns MD    ondansetron (ZOFRAN ODT) tablet 4 mg, 4 mg, Oral, Q8H PRN **OR** ondansetron (ZOFRAN) injection 4 mg, 4 mg, IntraVENous, Q6H PRN, Emmanuel Kerns MD    metoprolol tartrate (LOPRESSOR) tablet 25 mg, 25 mg, Oral, Q12H, Emmanuel Kerns MD, 25 mg at 10/03/21 0802    ketorolac (TORADOL) injection 30 mg, 30 mg, IntraVENous, Q6H PRN, Emmanuel Kerns MD, 30 mg at 10/02/21 1621    fentaNYL citrate (PF) injection 25 mcg, 25 mcg, IntraVENous, Q6H PRN, Emmanuel Kerns MD, 25 mcg at 10/02/21 1544    naloxone [facility]) syringe 0.4 mg, 0.4 mg, IntraVENous, PRN, Emmanuel Kerns MD    aspirin delayed-release tablet 81 mg, 81 mg, Oral, DAILY, Emmanuel Kerns MD, 81 mg at 10/03/21 0802  ________________________________________________________________________  Care Plan discussed with:    Comments   Patient y    Family      RN y    Care Manager     Consultant                        Multidiciplinary team rounds were held today with , nursing, pharmacist and clinical coordinator. Patient's plan of care was discussed; medications were reviewed and discharge planning was addressed. ________________________________________________________________________  Total NON critical care TIME:  35    Minutes    Total CRITICAL CARE TIME Spent:   Minutes non procedure based      Comments   >50% of visit spent in counseling and coordination of care     ________________________________________________________________________  May Souza MD     Procedures: see electronic medical records for all procedures/Xrays and details which were not copied into this note but were reviewed prior to creation of Plan. LABS:  I reviewed today's most current labs and imaging studies.   Pertinent labs include:  Recent Labs     10/03/21  0346 10/02/21  0718   WBC 8.6 9.6   HGB 13.9 16.2   HCT 41.8 46.3    251     Recent Labs     10/03/21  0346 10/02/21  0741    137   K 4.3 4.0   CL 105 105   CO2 28 28   * 130*   BUN 13 14   CREA 1.05 1.19   CA 8.7 9.0   MG  --  2.1   ALB  --  3.1*   TBILI  --  1.3*   ALT  --  25       Signed: Paulette Zhang MD

## 2021-10-04 NOTE — PROGRESS NOTES
Cardiology Progress Note  10/4/2021     Admit Date: 10/2/2021  Admit Diagnosis: Atrial fibrillation with RVR ([address] [I48.91]  Syncope [R55]  Chest pain [R07.9]  Dyspnea [R06.00]  Vasovagal syncope [R55]  Pleural effusion [J90]  CC: none currently  Cardiac Assessment/Plan (as noted by Dr Sharifa Castro):     Assessment:     1. Paroxysmal Afib with RVR (h/o Afib post op and now has recurrent episodes)   2. MV disease s/p MVR in 7/2020   3. Atypical chest pain   - Cath in 1/2020 showed no CAD   4. GERD  5. 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  Negative MPI 2008; No CAD @ cath 1/2020.  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      *Worse MEDINA 9/2019: Severe MR w/ fail segment; No CAD @ cath:    *MV repair (P2 resection w/ #38 annuloplasty band via mini R thoracotomy;  [facility]) 7/2020 (delayed due to covid).   *Echo 10/2020: EF 50-55%; good repair w/ no MS/MR     3) PAfib: post op 7/2020: spont NSR; Rx w amio/eliquis; Palps after but no documented Afib; Amio stopped; cont on dilt/bblocker.   *NSR 9/2020: stopped eliquis/dilt; 5 beats (? Sx) NSVT, no afib: Seen by EP: \"findings don't suggests an increased risk; no further testing needed for the nonsustained VT episode. \" Weaned off metoprolol.    4) GERD  5) Obesity; weight down to 170# 2016; 233# 2019. 228# to 233 # 2020. Rare ethanol.  No nicotine.  4-5 caff margy/day to 1/d.  Previous salt in his diet. [facility] [employer] [facility] [employer] works as a  & often has to lift heavy objects.        Mr Martinez Lozada presented with CP, chest tightness worse with deep breathing, sob, palpitations, noted to have Afib with RVR with HR 150s  - In ER immediately after he had IV access- had episode of bradycardia with HR 50s-hypotension - diaphoresis - near syncope - likely vegal episodes.    Converted to NSR after given Amio x1. He reported to have similar episode 1 week ago.      He restarted episode of afib with RVR this am.   Recommendations:  1.  Converted to NSR after dose of Amio in ER but then this morning has recurrent episode- will be started on Amiodarone again and hopefully will convert to NSR  - will avoid amiodarone for long term   2. On AC with lovenox - discharge on Apixaban   3. Continue metoprolol - if BP allows    4.  CP is atypical - likely in the setting of Afib with RVR, CE negative     Will consult EP for evaluation for ablation or AAD      Echo 10/2/21:  · LV: Estimated LVEF is 50 - 55%. Normal cavity size and wall thickness. Low normal systolic function. Inconclusive left ventricular diastolic function. · PA: Pulmonary arterial systolic pressure is 25 mmHg. · MV: Status post mitral valve repair. 10/4: His AFib is SX; No Sx overnight. VSSAF; NSR; 94% RA. Nl CBC & BMP; Nl TSH. Cardiac meds: amio 400 bid; Eliquis 5 bid; asa 81; metoprolol 25 bid. Rec: OK for d/c from gen cards standpoint; Change metoprolol to XL 25 qday. Dr Shefali Patel to d/w pt @ ablation/AA Rx. For other plans, see orders.   Hospital problem list     Active Hospital Problems    Diagnosis Date Noted    Vasovagal syncope 10/02/2021    Syncope 10/02/2021    Chest pain 10/02/2021    Dyspnea 10/02/2021    Atrial fibrillation with RVR (HCC) 10/02/2021    Pleural effusion 10/02/2021        Subjective: Pako Lopezyer reports       Chest pain X none  consistent with:  Non-cardiac CP         Atypical CP     None now  On going  Anginal CP     Dyspnea X none  at rest  with exertion         improved  unchanged  worse              PND X none  overnight       Orthopnea X none  improved  unchanged  worse   Presyncope X none  improved  unchanged  worse     Ambulated in hallway without symptoms   Yes   Ambulated in room without symptoms x Yes   Objective:     Physical Exam:  Overall VSSAF;    Visit Vitals  /64 (BP 1 Location: Right arm, BP Patient Position: At rest)   Pulse 82   Temp 98.7 °F (37.1 °C)   Resp 15   Ht 6' (1.829 m)   Wt 104.3 kg (229 lb 15 oz)   SpO2 94%   BMI 31.19 kg/m²     Temp (24hrs), Av °F (37.2 °C), Min:98.6 °F (37 °C), Max:99.9 °F (37.7 °C)    Patient Vitals for the past 8 hrs:   Pulse   10/04/21 0735 82   10/04/21 0413 83     Patient Vitals for the past 8 hrs:   Resp   10/04/21 0735 15   10/04/21 0413 16     Patient Vitals for the past 8 hrs:   BP   10/04/21 0735 125/64   10/04/21 0413 100/71       Intake/Output Summary (Last 24 hours) at 10/4/2021 0813  Last data filed at 10/3/2021 1600  Gross per 24 hour   Intake 640 ml   Output 500 ml   Net 140 ml     General Appearance: Well developed, well nourished, no acute distress. Ears/Nose/Mouth/Throat:   Normal MM; anicteric. JVP: WNL   Resp:   Lungs clear to auscultation bilaterally. Nl resp effort. Cardiovascular:  RRR, S1, S2 normal, no new murmur. No gallop or rub. Abdomen:   Soft, non-tender, bowel sounds are present. Extremities: No edema bilaterally. Skin:  Neuro: Warm and dry. A/O x3, grossly nonfocal       cath site intact w/o hematoma or new bruit; distal pulse unchanged  Yes   Data Review:     Telemetry independently reviewed x sinus  chronic afib  parox afib  NSVT     ECG independently reviewed  NSR  afib  no significant changes  NSST-Tw chgs     x no new ECG provided for review   Lab results reviewed as noted below. Current medications reviewed as noted below. No results for input(s): PH, PCO2, PO2 in the last 72 hours.   Recent Labs     10/02/21  1640 10/02/21  1106 10/02/21  0741   TROIQ <0.05 <0.05 <0.05     Recent Labs     10/04/21  0411 10/03/21  0346 10/02/21  0741 10/02/21  0718    138 137  --    K 4.3 4.3 4.0  --     105 105  --    CO2 30 28 28  --    BUN 15 13 14  --    CREA 1.21 1.05 1.19  --    GFRAA >60 >60 >60  --    GLU 90 108* 130*  --    CA 8.6 8.7 9.0  --    ALB  --   --  3.1*  --    WBC 8.4 8.6 --  9.6   HGB 13.9 13.9  --  16.2   HCT 41.9 41.8  --  46.3    224  --  251     No results found for: CHOL, CHOLX, CHLST, CHOLV, HDL, HDLP, LDL, LDLC, DLDLP, TGLX, TRIGL, TRIGP, CHHD, CHHDX  Recent Labs     10/02/21  0741   AP 83   TP 7.3   ALB 3.1*   GLOB 4.2*     No results for input(s): INR, PTP, APTT, INREXT in the last 72 hours.    No components found for: [place]    Current Facility-Administered Medications   Medication Dose Route Frequency    apixaban (ELIQUIS) tablet 5 mg  5 mg Oral Q12H    amiodarone (CORDARONE) tablet 400 mg  400 mg Oral BID    sodium chloride (NS) flush 5-40 mL  5-40 mL IntraVENous Q8H    sodium chloride (NS) flush 5-40 mL  5-40 mL IntraVENous PRN    acetaminophen (TYLENOL) tablet 650 mg  650 mg Oral Q6H PRN    Or    acetaminophen (TYLENOL) suppository 650 mg  650 mg Rectal Q6H PRN    polyethylene glycol (MIRALAX) packet 17 g  17 g Oral DAILY PRN    ondansetron (ZOFRAN ODT) tablet 4 mg  4 mg Oral Q8H PRN    Or    ondansetron (ZOFRAN) injection 4 mg  4 mg IntraVENous Q6H PRN    metoprolol tartrate (LOPRESSOR) tablet 25 mg  25 mg Oral Q12H    ketorolac (TORADOL) injection 30 mg  30 mg IntraVENous Q6H PRN    fentaNYL citrate (PF) injection 25 mcg  25 mcg IntraVENous Q6H PRN    naloxone (NARCAN) syringe 0.4 mg  0.4 mg IntraVENous PRN    aspirin delayed-release tablet 81 mg  81 mg Oral DAILY        Frankey Canon, MD

## 2021-10-04 NOTE — PROGRESS NOTES
Hospitalist Progress Note    NAME: Sommer Collins   :  1970   MRN:  [id number]       Assessment / Plan:  Paroxysmal atrial fibrillation with rapid ventricular rate  Syncope in the emergency department due to suspected vasovagal causes  -Patient is going in and out of the A. fib with RVR. Today again got to bolus of the amiodarone 150 mg IV along with increased dose of metoprolol 37.5 mg.  Dr. Jessie Sears on boardappreciate the recommendations. We will observe the patient today with a new change in medications, if rate and rhythm improved then will discharge tomorrow.  -CardiologyDr. Stacey Mejia on boardappreciate the recommendation.  -Telemetry monitoring    Chest pain pleuritic type of pain  -CT chest shows no evidence of pulmonary embolism  -Troponins are normal  -Chest pain is currently resolved  -Cardiology did not recommend any invasive work-up at this time    Fever rule out infectious etiology  -Had 1 episode of fever of 100.5 but no new symptoms  -UA negative so far, blood culture negative. CTA chest showed no evidence of pneumonia. -SARS-CoV-2 negative    S/p mitral valve repair 2020  --continue aspirin 81mg     Hx thrombus in left leg greater saphenous vein 2021, unprovoked  --not placed anticoagulation as it is a superficial vein. Continue aspirin  --left foot/ankle swelling resolved after 1 month  -Now on Eliquis     Body mass index is 31.19 kg/m².     Code: full  DVT prophylaxis: Eliquis  Surrogate decision maker:  Wife Shari Bryan          Body mass index is 31.19 kg/m². Subjective:     Chief Complaint / Reason for Physician Visit  Patient seen today, doing fine, resting comfortably on the bed, no complaints today.       Review of Systems:  Symptom Y/N Comments  Symptom Y/N Comments   Fever/Chills    Chest Pain     Poor Appetite    Edema     Cough    Abdominal Pain     Sputum    Joint Pain     SOB/MEDINA    Pruritis/Rash     Nausea/vomit    Tolerating PT/OT     Diarrhea    Tolerating Diet Constipation    Other       Could NOT obtain due to:      Objective:     VITALS:   Last 24hrs VS reviewed since prior progress note. Most recent are:  Patient Vitals for the past 24 hrs:   Temp Pulse Resp BP SpO2   10/04/21 1010 98.7 °F (37.1 °C) 84 15 107/62 95 %   10/04/21 0950    121/73    10/04/21 0940  90      10/04/21 0831  (!) 133      10/04/21 0735 98.7 °F (37.1 °C) 82 15 125/64 94 %   10/04/21 0413 98.6 °F (37 °C) 83 16 100/71 95 %   10/03/21 2312 98.7 °F (37.1 °C) 83 16 98/63 95 %   10/03/21 1933 99.3 °F (37.4 °C) 87 16 115/66 96 %   10/03/21 1849 99.9 °F (37.7 °C) 97 16 110/66 97 %   10/03/21 1737  95  108/65    10/03/21 1519 99.2 °F (37.3 °C) (!) 159 16  96 %   10/03/21 1453  (!) 162 14 (!) 104/90 97 %       Intake/Output Summary (Last 24 hours) at 10/4/2021 1346  Last data filed at 10/3/2021 1600  Gross per 24 hour   Intake 340 ml   Output 500 ml   Net -160 ml        PHYSICAL EXAM:  General: WD, WN. Alert, cooperative, no acute distress    EENT:  EOMI. Anicteric sclerae. MMM  Resp:  CTA bilaterally, no wheezing or rales. No accessory muscle use  CV:  Regular  rhythm,  No edema  GI:  Soft, Non distended, Non tender.  +Bowel sounds  Neurologic:  Alert and oriented X 3, normal speech,   Psych:   Good insight. Not anxious nor agitated  Skin:  No rashes.   No jaundice    Reviewed most current lab test results and cultures  YES  Reviewed most current radiology test results   YES  Review and summation of old records today    NO  Reviewed patient's current orders and MAR    YES  PMH/SH reviewed - no change compared to H&P          Current Facility-Administered Medications:     metoprolol succinate (TOPROL-XL) XL tablet 37.5 mg, 37.5 mg, Oral, DAILY, Macey Villareal MD    apixaban (ELIQUIS) tablet 5 mg, 5 mg, Oral, Q12H, Hosea Draper MD, 5 mg at 10/04/21 0815    amiodarone (CORDARONE) tablet 400 mg, 400 mg, Oral, BID, Yuli Lopez MD, 400 mg at 10/04/21 0815    sodium chloride (NS) flush 5-40 mL, 5-40 mL, IntraVENous, Q8H, Pool Benz MD, 10 mL at 10/04/21 0817    sodium chloride (NS) flush 5-40 mL, 5-40 mL, IntraVENous, PRN, Pool Benz MD    acetaminophen (TYLENOL) tablet 650 mg, 650 mg, Oral, Q6H PRN **OR** acetaminophen (TYLENOL) suppository 650 mg, 650 mg, Rectal, Q6H PRN, Pool Benz MD    polyethylene glycol (MIRALAX) packet 17 g, 17 g, Oral, DAILY PRN, Pool Benz MD    ondansetron (ZOFRAN ODT) tablet 4 mg, 4 mg, Oral, Q8H PRN **OR** ondansetron (ZOFRAN) injection 4 mg, 4 mg, IntraVENous, Q6H PRN, Pool Benz MD    ketorolac (TORADOL) injection 30 mg, 30 mg, IntraVENous, Q6H PRN, Pool Benz MD, 30 mg at 10/02/21 1621    fentaNYL citrate (PF) injection 25 mcg, 25 mcg, IntraVENous, Q6H PRN, Pool Benz MD, 25 mcg at 10/02/21 1544    naloxone [facility]) syringe 0.4 mg, 0.4 mg, IntraVENous, PRN, Pool Benz MD    aspirin delayed-release tablet 81 mg, 81 mg, Oral, DAILY, Pool Benz MD, 81 mg at 10/04/21 0815  ________________________________________________________________________  Care Plan discussed with:    Comments   Patient y    Family      RN y    Care Manager     Consultant                        Multidiciplinary team rounds were held today with , nursing, pharmacist and clinical coordinator. Patient's plan of care was discussed; medications were reviewed and discharge planning was addressed. ________________________________________________________________________  Total NON critical care TIME:  35    Minutes    Total CRITICAL CARE TIME Spent:   Minutes non procedure based      Comments   >50% of visit spent in counseling and coordination of care     ________________________________________________________________________  Kya Dodge MD     Procedures: see electronic medical records for all procedures/Xrays and details which were not copied into this note but were reviewed prior to creation of Plan. LABS:  I reviewed today's most current labs and imaging studies.   Pertinent labs include:  Recent Labs     10/04/21  0411 10/03/21  0346 10/02/21  0718   WBC 8.4 8.6 9.6   HGB 13.9 13.9 16.2   HCT 41.9 41.8 46.3    224 251     Recent Labs     10/04/21  0411 10/03/21  0346 10/02/21  0741    138 137   K 4.3 4.3 4.0    105 105   CO2 30 28 28   GLU 90 108* 130*   BUN 15 13 14   CREA 1.21 1.05 1.19   CA 8.6 8.7 9.0   MG  --   --  2.1   ALB  --   --  3.1*   TBILI  --   --  1.3*   ALT  --   --  25       Signed: Foreign Jean Baptiste MD

## 2021-10-05 VITALS
BODY MASS INDEX: 31.14 KG/M2 | HEART RATE: 83 BPM | TEMPERATURE: 98.9 F | RESPIRATION RATE: 19 BRPM | HEIGHT: 72 IN | DIASTOLIC BLOOD PRESSURE: 66 MMHG | WEIGHT: 229.94 LBS | SYSTOLIC BLOOD PRESSURE: 116 MMHG | OXYGEN SATURATION: 97 %

## 2021-10-05 PROCEDURE — 74011250637 HC RX REV CODE- 250/637: Performed by: STUDENT IN AN ORGANIZED HEALTH CARE EDUCATION/TRAINING PROGRAM

## 2021-10-05 PROCEDURE — 74011250637 HC RX REV CODE- 250/637: Performed by: INTERNAL MEDICINE

## 2021-10-05 PROCEDURE — 74011250637 HC RX REV CODE- 250/637: Performed by: HOSPITALIST

## 2021-10-05 PROCEDURE — 94760 N-INVAS EAR/PLS OXIMETRY 1: CPT

## 2021-10-05 RX ORDER — AMIODARONE HYDROCHLORIDE 200 MG/1
200 TABLET ORAL DAILY
Status: DISCONTINUED | OUTPATIENT
Start: 2021-10-12 | End: 2021-10-05 | Stop reason: HOSPADM

## 2021-10-05 RX ORDER — AMIODARONE HYDROCHLORIDE 400 MG/1
400 TABLET ORAL 2 TIMES DAILY
Qty: 60 TABLET | Refills: 0 | Status: SHIPPED | OUTPATIENT
Start: 2021-10-05 | End: 2021-10-05

## 2021-10-05 RX ORDER — AMIODARONE HYDROCHLORIDE 200 MG/1
200 TABLET ORAL DAILY
Qty: 30 TABLET | Refills: 0 | Status: SHIPPED | OUTPATIENT
Start: 2021-10-12 | End: 2021-11-11

## 2021-10-05 RX ORDER — AMIODARONE HYDROCHLORIDE 400 MG/1
400 TABLET ORAL 2 TIMES DAILY
Qty: 14 TABLET | Refills: 0 | Status: SHIPPED | OUTPATIENT
Start: 2021-10-05 | End: 2021-10-12

## 2021-10-05 RX ORDER — AMIODARONE HYDROCHLORIDE 200 MG/1
400 TABLET ORAL 2 TIMES DAILY
Status: DISCONTINUED | OUTPATIENT
Start: 2021-10-05 | End: 2021-10-05 | Stop reason: HOSPADM

## 2021-10-05 RX ORDER — METOPROLOL SUCCINATE 25 MG/1
37.5 TABLET, EXTENDED RELEASE ORAL DAILY
Qty: 45 TABLET | Refills: 0 | Status: SHIPPED | OUTPATIENT
Start: 2021-10-05 | End: 2021-11-04

## 2021-10-05 RX ADMIN — AMIODARONE HYDROCHLORIDE 400 MG: 200 TABLET ORAL at 08:37

## 2021-10-05 RX ADMIN — APIXABAN 5 MG: 5 TABLET, FILM COATED ORAL at 08:37

## 2021-10-05 RX ADMIN — METOPROLOL SUCCINATE 37.5 MG: 25 TABLET, EXTENDED RELEASE ORAL at 08:33

## 2021-10-05 RX ADMIN — ASPIRIN 81 MG: 81 TABLET, COATED ORAL at 08:37

## 2021-10-05 RX ADMIN — Medication 10 ML: at 08:33

## 2021-10-05 NOTE — DISCHARGE SUMMARY
Hospitalist Discharge Summary     Patient ID:  Alek Williamson  [id number]  89 y.o.  1970  10/2/2021    PCP on record: Isidoro Reece MD    Admit date: 10/2/2021  Discharge date and time: 10/5/2021    DISCHARGE DIAGNOSIS:    Paroxysmal atrial fibrillation with rapid ventricular rate  Syncope in the emergency department due to suspected vasovagal causes  Chest pain pleuritic type of pain  Fever- resolved  S/p mitral valve repair 7/2020  Hx thrombus in left leg greater saphenous vein 7/2021, unprovoked    CONSULTATIONS:  IP CONSULT TO HOSPITALIST    Excerpted HPI from H&P of Luis Smith MD:  Alek Williamson is a 46 y.o. male with past medical history of severe mitral regurgitation who underwent mitral valve repair July 2020 by  [facility], hx of postop afib (placed on amiodarone, diltiazem, metoprolol, eliquis at discharge 7/20), now just on ASA 81mg, presents to ER with chest pain.     He reports severe chest pain started last Sunday 9/26 a lot of chest pain while at [employer], thought from heavy lifting the day before (lifted cast iron pot belly stove). He took tylenol and left over oxycodone from surgery and pain resolved. He was chest pain free for whole week.     Last night had pain in chest when sleeping on his side, made him have to lay on his back. Today while walking at [employer], developed severe pain in center of chest 10/10, sharp and pressure, more sharp with inspiration. Radiate into left chest and shoulder and then into right chest.   Pain improved with fentanyl in ER. Now only have sharp pain with inspiration 5/10. Chest pain is associated with SOB and +palpitations last Sunday and again today. No n/v or sweats.       Unvaccinated for covid-19. Denies fever, chills, lost of taste or smell, diarrhea, headache, myalgias.     Patient noted to be in afib with RVR on arrival HR in 140-150s.   About 1.5 minutes after IV placement, ER nurse noted  \"pt's heart rate dropped from 166 to 60's. Pt stated he didn't feel well and pt became diaphoretic and pt became unresponsive. \" MD Candido Talbert at bedside. Pt heart rate dropped to the 50's. Placed pt on pads and pt started to become more responsive. He then became more tachycardic still in afib in 100s with low BP 83/55 so was given IVF and amiodarone bolus 150mg. He then converted into SR. Wife reports he had similar syncopal episode when got him up after MV repair and took out IVs for discharge where he became diaphoretic, dizzy and passed out and was in afib with rvr at that time. He also report they had difficulty regulating blood sugars postop. Patient was taken off amiodarone, diltiazem, metoprolol and eliquis about 1 month after discharge 7/2020 by his cardiologist, after wearing Holter monitor by his report.     Reports he was diagnosed with thrombus in left leg clot in left great saphenous vein in July 7/2021 after waking up with left foot and ankle swelling. Did not require anticoagulation and swelling resolved after 1 month. No other recent illness.       ______________________________________________________________________  DISCHARGE SUMMARY/HOSPITAL COURSE:  for full details see H&P, daily progress notes, labs, consult notes. Paroxysmal atrial fibrillation with rapid ventricular rate  Syncope in the emergency department due to suspected vasovagal causes  -Patient in sinus rhythm today. On amiodarone and beta-blocker  Dr. Angelina Lynn on boardappreciate the recommendations.   -CardiologyDr. Ocampo Labs on boardappreciate the recommendation.  -Telemetry monitoring     Chest pain pleuritic type of pain  -CT chest shows no evidence of pulmonary embolism  -Troponins are normal  -Chest pain is currently resolved  -Cardiology did not recommend any invasive work-up at this time     Fever rule out infectious etiology  -Had 1 episode of fever of 100.5 but no new symptoms  -UA negative so far, blood culture negative.  CTA chest showed no evidence of pneumonia. -SARS-CoV-2 negative     S/p mitral valve repair 7/2020  --continue aspirin 81mg     Hx thrombus in left leg greater saphenous vein 7/2021, unprovoked  --not placed anticoagulation as it is a superficial vein.  Continue aspirin  --left foot/ankle swelling resolved after 1 month  -Now on Eliquis           _______________________________________________________________________  Patient seen and examined by me on discharge day. Pertinent Findings:  Gen:    Not in distress  Chest: Clear lungs  CVS:   Regular rhythm. No edema  Abd:  Soft, not distended, not tender  Neuro:  Alert,   _______________________________________________________________________  DISCHARGE MEDICATIONS:   Discharge Medication List as of 10/5/2021 10:02 AM      START taking these medications    Details   apixaban (ELIQUIS) 5 mg tablet Take 1 Tablet by mouth every twelve (12) hours for 30 days. , Normal, Disp-60 Tablet, R-0      metoprolol succinate (TOPROL-XL) 25 mg XL tablet Take 1.5 Tablets by mouth daily for 30 days. , Normal, Disp-45 Tablet, R-0         CONTINUE these medications which have CHANGED    Details   !! amiodarone (CORDARONE) 200 mg tablet Take 1 Tablet by mouth daily for 30 days. , Normal, Disp-30 Tablet, R-0      !! amiodarone (PACERONE) 400 mg tablet Take 1 Tablet by mouth two (2) times a day for 14 doses. , Normal, Disp-14 Tablet, R-0       !! - Potential duplicate medications found. Please discuss with provider. CONTINUE these medications which have NOT CHANGED    Details   aspirin delayed-release 81 mg tablet Take 81 mg by mouth daily. , Historical Med      multivit-minerals/FA/lycopene (ONE-A-DAY MEN'S PO) Take  by mouth daily. , Historical Med               Patient Follow Up Instructions:    Activity: Activity as tolerated  Diet: Cardiac Diet  Wound Care: None needed    If you have questions regarding the hospital related prescriptions or hospital related issues please call [facility] at . You can always direct your questions to your primary care doctor if you are unable to reach your hospital physician; your PCP works as an extension of your hospital doctor just like your hospital doctor is an extension of your PCP for your time at [facility]. If you experience any of the following symptoms then please call your primary care physician or return to the emergency room if you cannot get hold of your doctor:  Fever, chills, nausea, vomiting, diarrhea, change in mentation, falling, bleeding, shortness of breath    Follow-up Information     Follow up With Specialties Details Why Contact Info    Erica Bradford MD Cardiology Schedule an appointment as soon as possible for a visit in 3 months  [address]  [id number]  [address] [phone]      [address] [place] [address]  On 10/11/2021 11am with PAYAM Alonso. Please bring your discharge papers with you to visit.   [address] Jessica Casanova MD Cardiology Schedule an appointment as soon as possible for a visit in 1 month  [address]  [address]  [facility]  [phone]          ________________________________________________________________    Risk of deterioration: Low    Condition at Discharge:  Stable  __________________________________________________________________    Disposition  Home with family, no needs    ____________________________________________________________________    Code Status: Full Code  ___________________________________________________________________      Total time in minutes spent coordinating this discharge (includes going over instructions, follow-up, prescriptions, and preparing report for sign off to her PCP) :  >30 minutes    Signed:  Jose Almeida MD

## 2021-10-05 NOTE — PROGRESS NOTES
CM notified of Pt discharging today. PCP appointment has been arranged and added to AVS. Cardiology office to follow-up with Pt with information regarding follow-up appointment. No further discharge needs indicated at this time. Pt is cleared from CM standpoint. Transition of Care Plan:    RUR:6% low  Disposition: home   Follow up appointments: PCP and Cardiology   DME needed: None  Transportation at Discharge: Spouse to transport   [address] or means to access home:    Pt has access to home    IM Medicare Letter: N/a  Is patient a BCPI-A Bundle:  N/a         If yes, was Bundle Letter given?: N/a    Caregiver Contact:Wife: Dara Jaclyn: [phone]  Discharge Caregiver contacted prior to discharge? Wife to be notified.              Jennifer Stone, [facility], LEE Wise

## 2021-10-05 NOTE — PROGRESS NOTES
Cardiology Progress Note  10/5/2021     Admit Date: 10/2/2021  Admit Diagnosis: Atrial fibrillation with RVR ([facility] [address] [I48.91]  Syncope [R55]  Chest pain [R07.9]  Dyspnea [R06.00]  Vasovagal syncope [R55]  Pleural effusion [J90]  CC: none currently  Cardiac Assessment/Plan (as noted by Dr Christel Granda):     Assessment:     1. Paroxysmal Afib with RVR (h/o Afib post op and now has recurrent episodes)   2. MV disease s/p MVR in 7/2020   3. Atypical chest pain   - Cath in 1/2020 showed no CAD   4. GERD  5. 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  Negative MPI 2008; No CAD @ cath 1/2020.  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      *Worse MEDINA 9/2019: Severe MR w/ fail segment; No CAD @ cath:    *MV repair (P2 resection w/ #38 annuloplasty band via mini R thoracotomy; Dr Je Molina) 7/2020 (delayed due to covid).   *Echo 10/2020: EF 50-55%; good repair w/ no MS/MR     3) PAfib: post op 7/2020: spont NSR; Rx w amio/eliquis; Palps after but no documented Afib; Amio stopped; cont on dilt/bblocker.   *NSR 9/2020: stopped eliquis/dilt; 5 beats (? Sx) NSVT, no afib: Seen by EP: \"findings don't suggests an increased risk; no further testing needed for the nonsustained VT episode. \" Weaned off metoprolol.    4) GERD  5) Obesity; weight down to 170# 2016; 233# 2019. 228# to 233 # 2020. Rare ethanol.  No nicotine.  4-5 caff margy/day to 1/d.  Previous salt in his diet. Evelio Dueñas works as a  & often has to lift heavy objects.        Mr Justin Bright presented with CP, chest tightness worse with deep breathing, sob, palpitations, noted to have Afib with RVR with HR 150s  - In ER immediately after he had IV access- had episode of bradycardia with HR 50s-hypotension - diaphoresis - near syncope - likely vegal episodes.    Converted to NSR after given Amio x1. He reported to have similar episode 1 week ago.      He restarted episode of afib with RVR this am.   Recommendations:  1.  Converted to NSR after dose of Amio in ER but then this morning has recurrent episode- will be started on Amiodarone again and hopefully will convert to NSR  - will avoid amiodarone for long term   2. On AC with lovenox - discharge on Apixaban   3. Continue metoprolol - if BP allows    4.  CP is atypical - likely in the setting of Afib with RVR, CE negative     Will consult EP for evaluation for ablation or AAD      Echo 10/2/21:  · LV: Estimated LVEF is 50 - 55%. Normal cavity size and wall thickness. Low normal systolic function. Inconclusive left ventricular diastolic function. · PA: Pulmonary arterial systolic pressure is 25 mmHg. · MV: Status post mitral valve repair. 10/4: His AFib is SX; No Sx overnight. VSSAF; NSR; 94% RA. Nl CBC & BMP; Nl TSH. Cardiac meds: amio 400 bid; Eliquis 5 bid; asa 81; metoprolol 25 bid. 10/5: VSSAF; Brief PAFib yesterday am; none since; No labs today;     Cardiac meds: amio 400 bid; Eliquis 5 bid; asa 81; toprol XL 37.5 qday. Rec: OK for d/c from cardiac standpoint; Pt to f/u with Dr Twan Calderon @ ablation/AA Rx; keep 400 bid for week, then 200 qday; OV with Dr Twan Calderon in 1 month (d/w Dr Connie Metcalf). For other plans, see orders.   Hospital problem list     Active Hospital Problems    Diagnosis Date Noted    Vasovagal syncope 10/02/2021    Syncope 10/02/2021    Chest pain 10/02/2021    Dyspnea 10/02/2021    Atrial fibrillation with RVR (HCC) 10/02/2021    Pleural effusion 10/02/2021        Subjective: Holly Crump reports       Chest pain X none  consistent with:  Non-cardiac CP         Atypical CP     None now  On going  Anginal CP     Dyspnea X none  at rest  with exertion         improved  unchanged  worse              PND X none  overnight       Orthopnea X none  improved  unchanged  worse   Presyncope X none improved  unchanged  worse     Ambulated in hallway without symptoms  X Yes   Ambulated in room without symptoms x Yes   Objective:     Physical Exam:  Overall VSSAF;    Visit Vitals  /66 (BP 1 Location: Right arm, BP Patient Position: At rest)   Pulse 83   Temp 98.9 °F (37.2 °C)   Resp 19   Ht 6' (1.829 m)   Wt 104.3 kg (229 lb 15 oz)   SpO2 97%   BMI 31.19 kg/m²     Temp (24hrs), Av.4 °F (36.9 °C), Min:97.8 °F (36.6 °C), Max:98.9 °F (37.2 °C)    Patient Vitals for the past 8 hrs:   Pulse   10/05/21 0736 83   10/05/21 0410 77     Patient Vitals for the past 8 hrs:   Resp   10/05/21 0736 19   10/05/21 0410 18     Patient Vitals for the past 8 hrs:   BP   10/05/21 0736 116/66   10/05/21 0410 114/64       Intake/Output Summary (Last 24 hours) at 10/5/2021 0926  Last data filed at 10/4/2021 1700  Gross per 24 hour   Intake 720 ml   Output    Net 720 ml     General Appearance: Well developed, well nourished, no acute distress. Ears/Nose/Mouth/Throat:   Normal MM; anicteric. JVP: WNL   Resp:   Lungs clear to auscultation bilaterally. Nl resp effort. Cardiovascular:  RRR, S1, S2 normal, no new murmur. No gallop or rub. Abdomen:   Soft, non-tender, bowel sounds are present. Extremities: No edema bilaterally. Skin:  Neuro: Warm and dry. A/O x3, grossly nonfocal       cath site intact w/o hematoma or new bruit; distal pulse unchanged  Yes   Data Review:     Telemetry independently reviewed x sinus  chronic afib  parox afib  NSVT     ECG independently reviewed  NSR  afib  no significant changes  NSST-Tw chgs     x no new ECG provided for review   Lab results reviewed as noted below. Current medications reviewed as noted below. No results for input(s): PH, PCO2, PO2 in the last 72 hours.   Recent Labs     10/02/21  1640 10/02/21  1106   TROIQ <0.05 <0.05     Recent Labs     10/04/21  0411 10/03/21  0346    138   K 4.3 4.3    105   CO2 30 28   BUN 15 13   CREA 1.21 1.05   GFRAA >60 >60 GLU 90 108*   CA 8.6 8.7   WBC 8.4 8.6   HGB 13.9 13.9   HCT 41.9 41.8    224     No results found for: CHOL, CHOLX, CHLST, CHOLV, HDL, HDLP, LDL, LDLC, DLDLP, TGLX, TRIGL, TRIGP, CHHD, CHHDX  No results for input(s): AP, TBIL, TP, ALB, GLOB, GGT, AML, LPSE in the last 72 hours. No lab exists for component: SGOT, GPT, AMYP, HLPSE  No results for input(s): INR, PTP, APTT, INREXT, INREXT in the last 72 hours.    No components found for: [place]    Current Facility-Administered Medications   Medication Dose Route Frequency    metoprolol succinate (TOPROL-XL) XL tablet 37.5 mg  37.5 mg Oral DAILY    apixaban (ELIQUIS) tablet 5 mg  5 mg Oral Q12H    amiodarone (CORDARONE) tablet 400 mg  400 mg Oral BID    sodium chloride (NS) flush 5-40 mL  5-40 mL IntraVENous Q8H    sodium chloride (NS) flush 5-40 mL  5-40 mL IntraVENous PRN    acetaminophen (TYLENOL) tablet 650 mg  650 mg Oral Q6H PRN    Or    acetaminophen (TYLENOL) suppository 650 mg  650 mg Rectal Q6H PRN    polyethylene glycol (MIRALAX) packet 17 g  17 g Oral DAILY PRN    ondansetron (ZOFRAN ODT) tablet 4 mg  4 mg Oral Q8H PRN    Or    ondansetron (ZOFRAN) injection 4 mg  4 mg IntraVENous Q6H PRN    ketorolac (TORADOL) injection 30 mg  30 mg IntraVENous Q6H PRN    fentaNYL citrate (PF) injection 25 mcg  25 mcg IntraVENous Q6H PRN    naloxone (NARCAN) syringe 0.4 mg  0.4 mg IntraVENous PRN    aspirin delayed-release tablet 81 mg  81 mg Oral DAILY        Carola Pantoja MD

## 2021-10-05 NOTE — PROGRESS NOTES
Discharge instructions discussed with patient. All questions answered. Patient verbalized understanding. Patient instructed to make follow up appts per discharge instructions. Patient signed discharge instructions after reviewing them and duplicate copy placed in chart. Belongings gathered and accounted for. Telemetry monitor and IV removed. Tolerating ambulation in room and hallway. Denies CP, SOB, or dizziness. Escorted out via w/c, discharged home with family in a private vehicle.

## 2021-10-05 NOTE — DISCHARGE INSTRUCTIONS
HOSPITALIST DISCHARGE INSTRUCTIONS    NAME: Jamar Chicas   :  1970   MRN:  [id number]     Date/Time:  10/5/2021 8:25 AM    ADMIT DATE: 10/2/2021   DISCHARGE DATE: 10/5/2021     Attending Physician: Jessie Biggs MD    DISCHARGE DIAGNOSIS:  Paroxysmal atrial fibrillation with rapid ventricular rate  Syncope in the emergency department due to suspected vasovagal causes  Chest pain pleuritic type of pain  Fever- resolved  S/p mitral valve repair 2020  Hx thrombus in left leg greater saphenous vein 2021, unprovoked    MEDICATIONS:  See above    · It is important that you take the medication exactly as they are prescribed. · Keep your medication in the bottles provided by the pharmacist and keep a list of the medication names, dosages, and times to be taken in your wallet. · Do not take other medications without consulting your doctor. Pain Management: per above medications    What to do at [address]:  Cardiac Diet    Recommended activity: Activity as tolerated    If you have questions regarding the hospital related prescriptions or hospital related issues please call [facility] at . You can always direct your questions to your primary care doctor if you are unable to reach your hospital physician; your PCP works as an extension of your hospital doctor just like your hospital doctor is an extension of your PCP for your time at [facility]. If you experience any of the following symptoms then please call your primary care physician or return to the emergency room if you cannot get hold of your doctor:  Fever, chills, nausea, vomiting, diarrhea, change in mentation, falling, bleeding, shortness of breath    Additional Instructions:      Bring these papers with you to your follow up appointments.  The papers will help your doctors be sure to continue the care plan from the hospital.              Information obtained by :  I understand that if any problems occur once I am at home I am to contact my physician. I understand and acknowledge receipt of the instructions indicated above.                                                                                                                                            Physician's or R.N.'s Signature                                                                  Date/Time                                                                                                                                              Patient or Representative Signature                                                          Da

## 2021-10-08 LAB
BACTERIA SPEC CULT: NORMAL
SERVICE CMNT-IMP: NORMAL

## 2021-10-12 NOTE — PROGRESS NOTES
Physician Progress Note      PATIENT:               Ursula Leyva  CSN #:                  [id number]  :                       1970  ADMIT DATE:       10/2/2021 7:07 AM  DISCH DATE:        10/5/2021 10:46 AM  RESPONDING  PROVIDER #:        Neisha Atkinson MD          QUERY TEXT:    Pt admitted with afib. Noted documentation of  10/5 cardiac pn: Pleural effusion. If possible, please document in progress notes and discharge summary:    The medical record reflects the following:  Risk Factors: afib  Clinical Indicators: 10/2 CT: Incidental small right pleural effusion. 10/5 cardiac pn: Pleural effusion  Treatment: amiodarone, metoprolol and apixaban  Options provided:  -- Small right pleural effusion  not clinically significant  -- Small right pleural effusion  is clinically significant  -- Other - I will add my own diagnosis  -- Disagree - Not applicable / Not valid  -- Disagree - Clinically unable to determine / Unknown  -- Refer to Clinical Documentation Reviewer    PROVIDER RESPONSE TEXT:    Small right pleural effusion is not clinically significant.     Query created by: Salvador Canela on 10/12/2021 2:07 PM      Electronically signed by:  Neisha Atkinson MD 10/12/2021 3:17 PM

## 2022-03-18 PROBLEM — R07.9 CHEST PAIN: Status: ACTIVE | Noted: 2021-10-02

## 2022-03-18 PROBLEM — I65.23 BILATERAL CAROTID ARTERY STENOSIS: Status: ACTIVE | Noted: 2020-03-10

## 2022-03-19 PROBLEM — I34.0 MITRAL REGURGITATION: Status: ACTIVE | Noted: 2020-07-22

## 2022-03-19 PROBLEM — R55 VASOVAGAL SYNCOPE: Status: ACTIVE | Noted: 2021-10-02

## 2022-03-19 PROBLEM — I48.91 ATRIAL FIBRILLATION WITH RVR (HCC): Status: ACTIVE | Noted: 2021-10-02

## 2022-03-19 PROBLEM — Z98.890 S/P MVR (MITRAL VALVE REPAIR): Status: ACTIVE | Noted: 2020-07-23

## 2022-03-19 PROBLEM — R55 SYNCOPE: Status: ACTIVE | Noted: 2021-10-02

## 2022-03-19 PROBLEM — J90 PLEURAL EFFUSION: Status: ACTIVE | Noted: 2021-10-02

## 2022-03-20 PROBLEM — R06.00 DYSPNEA: Status: ACTIVE | Noted: 2021-10-02

## 2023-01-03 NOTE — PERIOP NOTES
TRANSFER - OUT REPORT:    Verbal report given to SUNSHINE Navarro RN on Zari Grimaldo  being transferred to CCU for routine progression of care       Report consisted of patients Situation, Background, Assessment and   Recommendations(SBAR). Information from the following report(s) OR Summary and Procedure Summary was reviewed with the receiving nurse. Lines:   Single Lumen Venous Catheter 07/23/20 Right (Active)       Double Lumen 07/23/20 Left (Active)       Peripheral IV 07/23/20 Right Hand (Active)   Site Assessment Clean, dry, & intact 07/23/20 0700   Phlebitis Assessment 0 07/23/20 0700   Infiltration Assessment 0 07/23/20 0700   Dressing Status Clean, dry, & intact 07/23/20 0700   Dressing Type Transparent;Tape 07/23/20 0700   Hub Color/Line Status Pink; Infusing 07/23/20 0700       Arterial Line 07/23/20 Right Radial artery (Active)       Arterial Line 2 07/23/20 (Active)        Opportunity for questions and clarification was provided.       Patient transported with:   Monitor  O2 @ 10 liters via ET tube and AMBU Bag Ventilations accompanied by CRNA, Perfusionist, PAMANNIE and RN Circulator No

## 2023-05-11 RX ORDER — ASPIRIN 81 MG/1
81 TABLET ORAL DAILY
COMMUNITY

## 2023-09-19 NOTE — PROGRESS NOTES
Patient: Michael Chavarria   Age: 52 y.o. Patient Care Team:  Ludy Gandhi MD as PCP - General (Family Practice)  Ludy Gandhi MD as PCP - [facility]  Catherine Souza MD as Surgeon (General Surgery)  Anuradha Mcrae MD (Cardiology)  Joce Crawford MD (Cardiothoracic Surgery)    Diagnosis: The encounter diagnosis was Nonrheumatic mitral valve regurgitation. HPI:     Taken from prior office consult - no significant changes. Patient here to discuss surgery options with Dr. Kim Hamm.    52 y.o.  male who is a [employer]  with PMHx of MR, Obesity,and Atypical Chest Pain that is referred to the [address] [place] [address] by Dr. Dev Ambrosio for interventional evaluation of his severe mitral regurgitation. .Mr. Alvin Parker has complaints of fatigue,chest pain since he was a child, and dyspnea on exertion for about six months. He has undergone extensive cardiac work up with Dr. Dev Ambrosio including TTE, JEANIE, and Cardiac Cath. He was noted to have Severe Mitral Regurgitation and was referred for surgical consultation.     He is not on any medication currently except multivitamin. He is a nonsmoker and rarely drinks alcohol.     NYHA Classification: Class II              Class II (Mild): Slight limitation of physical activity. Comfortable at rest, but ordinary physical activity results in fatigue, palpitation, or dyspnea.                   Angina Classification: Class 0              Class 0: No symptoms    Current Medications:   Current Outpatient Medications   Medication Sig Dispense Refill    multivitamin, tx-iron-ca-min (THERA-M W/ IRON) 9 mg iron-400 mcg tab tablet Take 1 Tab by mouth daily. Vitals: Blood pressure 138/82, pulse 100, temperature 98.3 °F (36.8 °C), temperature source Oral, height 6' 1\" (1.854 m), weight 233 lb (105.7 kg), SpO2 97 %. Allergies: has No Known Allergies. Physical Exam:  General: NAD  Neuro: A&OX3. CASTILLO. PERRL.  Steady unassisted gait  Head:Normocephalic. Atraumatic. Symmetrical  Neck: Trachea Midline  Resp: CTA B. No Adv BS/cough/sputum/tachypnea with seated conversation  CV: S1S2 RRR. ANTHONY II/VI. No JVD/carotid bruits. Pink/warm/dry extremities. No LE peripheral edema  GI:Benign ab. Soft. NT/ND. Active BS  : Voids  Integ: No obvious s/s of infection or breakdown  Musculo/Skeletal: FROM in all major joints. normal muscle tone    Assessment/Plan:     1. Mitral Regurgitation - severe and symptomatic. Discuss case with Dr. Kristal Brumfield    2. GERD: Monitor    3.  Obesity - is limited due to dyspnea Patient/Caregiver provided printed discharge information.

## 2023-10-03 ENCOUNTER — ANESTHESIA EVENT (OUTPATIENT)
Facility: HOSPITAL | Age: 53
End: 2023-10-03
Payer: COMMERCIAL

## 2023-10-03 ASSESSMENT — ENCOUNTER SYMPTOMS: SHORTNESS OF BREATH: 1

## 2023-10-04 ENCOUNTER — ANESTHESIA (OUTPATIENT)
Facility: HOSPITAL | Age: 53
End: 2023-10-04
Payer: COMMERCIAL

## 2023-10-04 ENCOUNTER — HOSPITAL ENCOUNTER (OUTPATIENT)
Facility: HOSPITAL | Age: 53
Setting detail: OUTPATIENT SURGERY
Discharge: HOME | End: 2023-10-04
Attending: SPECIALIST | Admitting: SPECIALIST
Payer: COMMERCIAL

## 2023-10-04 VITALS
HEART RATE: 73 BPM | OXYGEN SATURATION: 97 % | WEIGHT: 230 LBS | RESPIRATION RATE: 20 BRPM | BODY MASS INDEX: 31.15 KG/M2 | HEIGHT: 72 IN | TEMPERATURE: 97.7 F | SYSTOLIC BLOOD PRESSURE: 122 MMHG | DIASTOLIC BLOOD PRESSURE: 72 MMHG

## 2023-10-04 PROCEDURE — 3600007502: Performed by: SPECIALIST

## 2023-10-04 PROCEDURE — 3600007512: Performed by: SPECIALIST

## 2023-10-04 PROCEDURE — 6360000002 HC RX W HCPCS: Performed by: NURSE ANESTHETIST, CERTIFIED REGISTERED

## 2023-10-04 PROCEDURE — 6370000000 HC RX 637 (ALT 250 FOR IP): Performed by: SPECIALIST

## 2023-10-04 PROCEDURE — 7100000011 HC PHASE II RECOVERY - ADDTL 15 MIN: Performed by: SPECIALIST

## 2023-10-04 PROCEDURE — 2580000003 HC RX 258: Performed by: NURSE ANESTHETIST, CERTIFIED REGISTERED

## 2023-10-04 PROCEDURE — 3700000000 HC ANESTHESIA ATTENDED CARE: Performed by: SPECIALIST

## 2023-10-04 PROCEDURE — 2500000003 HC RX 250 WO HCPCS: Performed by: NURSE ANESTHETIST, CERTIFIED REGISTERED

## 2023-10-04 PROCEDURE — 2580000003 HC RX 258: Performed by: SPECIALIST

## 2023-10-04 PROCEDURE — 2709999900 HC NON-CHARGEABLE SUPPLY: Performed by: SPECIALIST

## 2023-10-04 PROCEDURE — 3700000001 HC ADD 15 MINUTES (ANESTHESIA): Performed by: SPECIALIST

## 2023-10-04 PROCEDURE — 7100000010 HC PHASE II RECOVERY - FIRST 15 MIN: Performed by: SPECIALIST

## 2023-10-04 RX ORDER — SIMETHICONE 20 MG/.3ML
40 EMULSION ORAL EVERY 6 HOURS PRN
Status: DISCONTINUED | OUTPATIENT
Start: 2023-10-04 | End: 2023-10-04 | Stop reason: HOSPADM

## 2023-10-04 RX ORDER — SODIUM CHLORIDE 0.9 % (FLUSH) 0.9 %
5-40 SYRINGE (ML) INJECTION PRN
Status: DISCONTINUED | OUTPATIENT
Start: 2023-10-04 | End: 2023-10-04 | Stop reason: HOSPADM

## 2023-10-04 RX ORDER — SODIUM CHLORIDE 0.9 % (FLUSH) 0.9 %
5-40 SYRINGE (ML) INJECTION EVERY 12 HOURS SCHEDULED
Status: DISCONTINUED | OUTPATIENT
Start: 2023-10-04 | End: 2023-10-04 | Stop reason: HOSPADM

## 2023-10-04 RX ORDER — LIDOCAINE HYDROCHLORIDE 20 MG/ML
INJECTION, SOLUTION EPIDURAL; INFILTRATION; INTRACAUDAL; PERINEURAL PRN
Status: DISCONTINUED | OUTPATIENT
Start: 2023-10-04 | End: 2023-10-04 | Stop reason: SDUPTHER

## 2023-10-04 RX ORDER — 0.9 % SODIUM CHLORIDE 0.9 %
INTRAVENOUS SOLUTION INTRAVENOUS PRN
Status: DISCONTINUED | OUTPATIENT
Start: 2023-10-04 | End: 2023-10-04 | Stop reason: SDUPTHER

## 2023-10-04 RX ORDER — SODIUM CHLORIDE 9 MG/ML
25 INJECTION, SOLUTION INTRAVENOUS PRN
Status: DISCONTINUED | OUTPATIENT
Start: 2023-10-04 | End: 2023-10-04 | Stop reason: HOSPADM

## 2023-10-04 RX ADMIN — Medication 40 MG: at 10:26

## 2023-10-04 RX ADMIN — SODIUM CHLORIDE 400 ML: 900 INJECTION, SOLUTION INTRAVENOUS at 10:33

## 2023-10-04 RX ADMIN — SODIUM CHLORIDE 25 ML: 9 INJECTION, SOLUTION INTRAVENOUS at 10:10

## 2023-10-04 RX ADMIN — PROPOFOL 30 MG: 10 INJECTION, EMULSION INTRAVENOUS at 10:22

## 2023-10-04 RX ADMIN — PROPOFOL 50 MG: 10 INJECTION, EMULSION INTRAVENOUS at 10:26

## 2023-10-04 RX ADMIN — LIDOCAINE HYDROCHLORIDE 80 MG: 20 INJECTION, SOLUTION EPIDURAL; INFILTRATION; INTRACAUDAL; PERINEURAL at 10:20

## 2023-10-04 RX ADMIN — PROPOFOL 50 MG: 10 INJECTION, EMULSION INTRAVENOUS at 10:21

## 2023-10-04 RX ADMIN — PROPOFOL 20 MG: 10 INJECTION, EMULSION INTRAVENOUS at 10:23

## 2023-10-04 ASSESSMENT — PAIN - FUNCTIONAL ASSESSMENT: PAIN_FUNCTIONAL_ASSESSMENT: NONE - DENIES PAIN

## 2023-10-04 NOTE — PROGRESS NOTES
Endoscopy Case End Note:    9786:  Procedure scope was pre-cleaned, per protocol, at bedside by Judith Jackson, [address] [place] [address] Avenue:  Report received from anesthesia Thom Henley CRNA. See anesthesia flowsheet for intra-procedure vital signs and events.

## 2023-10-04 NOTE — OP NOTE
Colonoscopy Procedure Note    Indications:   Screening colonoscopy    Referring Physician: Cary Bradford MD  Anesthesia/Sedation: MAC anesthesia Propofol  Endoscopist:  Dr. Cortez Savage    Procedure in Detail:  Informed consent was obtained for the procedure, including sedation. Risks of perforation, hemorrhage, adverse drug reaction, and aspiration were discussed. The patient was placed in the left lateral decubitus position. Based on the pre-procedure assessment, including review of the patient's medical history, medications, allergies, and review of systems, he had been deemed to be an appropriate candidate for moderate sedation; he was therefore sedated with the medications listed above. The patient was monitored continuously with ECG tracing, pulse oximetry, blood pressure monitoring, and direct observations. A rectal examination was performed. The [id number] was inserted into the rectum and advanced under direct vision to the cecum, which was identified by the ileocecal valve and appendiceal orifice. The quality of the colonic preparation was adequate. A careful inspection was made as the colonoscope was withdrawn, including a retroflexed view of the rectum; findings and interventions are described below. Appropriate photodocumentation was obtained. Findings:    Scope advanced to the cecum. Preparation was adequate. Normal mucosa throughout. No polyps seen. Therapies:  none    Specimen:  none     Complications: None were encountered during the procedure. EBL: < 10 ml.     Recommendations:   -Repeat colonoscopy in 10 years    Signed By: Cortez Savage MD                        October 4, 2023

## 2023-10-04 NOTE — DISCHARGE INSTRUCTIONS
Silvia Chavez  [id number]  1970    COLON DISCHARGE INSTRUCTIONS  Discomfort:  Redness at IV site- apply warm compress to area; if redness or soreness persist- contact your physician  There may be a slight amount of blood passed from the rectum  Gaseous discomfort- walking, belching will help relieve any discomfort  You may not operate a vehicle for 12 hours  You may not engage in an occupation involving machinery or appliances for rest of today  You may not drink alcoholic beverages for at least 12 hours  Avoid making any critical decisions for at least 24 hour  DIET:   Regular diet. - however -  remember your colon is empty and a heavy meal will produce gas. Avoid these foods:  vegetables, fried / greasy foods, carbonated drinks for today. MEDICATIONS:        Regarding Aspirin or Nonsteroidal medications, please see below. ACTIVITY:  You may resume your normal daily activities it is recommended that you spend the remainder of the day resting -  avoid any strenuous activity. CALL M.D. ANY SIGN OF:  Increasing pain, nausea, vomiting  Abdominal distension (swelling)  New increased bleeding (oral or rectal)  Fever (chills)  Pain in chest area  Bloody discharge from nose or mouth  Shortness of breath  Tylenol as needed for pain.       Follow-up Instructions:   Call Dr. Naheed Kline for questions about procedure  at telephone #  [phone]              Repeat Colonoscopy in 10 years    Findings:  NORMAL Colonoscopy    Patient Education on Sedation / Analgesia Administered for Procedure      For 24 hours after general anesthesia or intravenous analgesia / sedation:  Have someone responsible help you with your care  Limit your activities  Do not drive and operate hazardous machinery  Do not make important personal, legal or business decisions  Do not drink alcoholic beverages  If you have not urinated within 8 hours after discharge, please contact your physician  Resume your medications unless otherwise

## 2023-10-04 NOTE — ANESTHESIA POSTPROCEDURE EVALUATION
Department of Anesthesiology  Postprocedure Note    Patient: Marlen Stinson  MRN: [id number]  YOB: 1970  Date of evaluation: 10/4/2023      Procedure Summary     Date: 10/04/23 Room / Location: [facility] ENDO 01 / [facility] END[facility]    Anesthesia Start: 7439 Anesthesia Stop: 2592    Procedure: COLONOSCOPY (Lower GI Region) Diagnosis:       Screening for colon cancer      (Screening for colon cancer [Z12.11])    Surgeons: Rick Berger MD Responsible Provider: Katie Stewart MD    Anesthesia Type: general, TIVA ASA Status: 3          Anesthesia Type: No value filed.     Tanner Phase I: Tanner Score: 10    Tanner Phase II: Tanner Score: 10      Anesthesia Post Evaluation    Patient location during evaluation: bedside  Patient participation: complete - patient participated  Level of consciousness: responsive to verbal stimuli and awake and alert  Pain score: 2  Nausea & Vomiting: no nausea  Complications: no  Cardiovascular status: blood pressure returned to baseline  Respiratory status: acceptable  Hydration status: euvolemic  Multimodal analgesia pain management approach  Pain management: adequate

## 2023-10-04 NOTE — H&P
normal symmetric air entry   Abdominal: soft, NT/ND+ BS   Neurological: Grossly normal   Extremities: extremities normal, atraumatic, no cyanosis or edema     Findings/Diagnosis: CRC screening    Plan of Care/Planned Procedure: Colonoscopy

## 2024-11-04 NOTE — PROGRESS NOTES
Problem: Cath Lab Procedures: Pre-Procedure  Goal: Off Pathway (Use only if patient is Off Pathway)  Outcome: Resolved/Met  Goal: Activity/Safety  Outcome: Resolved/Met  Goal: Consults, if ordered  Outcome: Resolved/Met  Goal: Diagnostic Test/Procedures  Outcome: Resolved/Met  Goal: Nutrition/Diet  Outcome: Resolved/Met  Goal: Discharge Planning  Outcome: Resolved/Met  Goal: Medications  Outcome: Resolved/Met  Goal: Respiratory  Outcome: Resolved/Met  Goal: Treatments/Interventions/Procedures  Outcome: Resolved/Met  Goal: Psychosocial  Outcome: Resolved/Met  Goal: *Verbalize description of procedure  Outcome: Resolved/Met  Goal: *Consent signed  Outcome: Resolved/Met     Problem: Falls - Risk of  Goal: *Absence of Falls  Description  Document Tito Fall Risk and appropriate interventions in the flowsheet.   Outcome: Resolved/Met  Note: Fall Risk Interventions:            Medication Interventions: Teach patient to arise slowly, Patient to call before getting OOB                   Problem: Patient Education: Go to Patient Education Activity  Goal: Patient/Family Education  Outcome: Resolved/Met Pt swabbed at this time by SADIA Singh; specimen labeled and sent to lab

## (undated) DEVICE — 72" ARTERIAL PRESSURE TUBING: Brand: ICU MEDICAL

## (undated) DEVICE — AGENT HEMSTAT W4XL4IN OXIDIZED REGENERATED CELOS ABSRB SFT

## (undated) DEVICE — CANNULA PERF 12GA L12.25IN GRY S STL AORT ROOT W/ LUER CONN

## (undated) DEVICE — PRESSURE MONITORING SET: Brand: TRUWAVE

## (undated) DEVICE — LAPAROSCOPIC TROCAR SLEEVE/SINGLE USE: Brand: KII® OPTICAL ACCESS SYSTEM

## (undated) DEVICE — SUTURE MCRYL SZ 3-0 L27IN ABSRB UD L24MM PS-1 3/8 CIR PRIM Y936H

## (undated) DEVICE — VENT CATHETER: Brand: EDWARDS LIFESCIENCES VENT CATHETER

## (undated) DEVICE — CENTRAL VENOUS CATHETER SET: Brand: COOK

## (undated) DEVICE — SYR ART 700 CLEAR MARK 7 -- ARTERION

## (undated) DEVICE — DRAPE,REIN 53X77,STERILE: Brand: MEDLINE

## (undated) DEVICE — HANDLE LT SNAP ON ULT DURABLE LENS FOR TRUMPF ALC DISPOSABLE

## (undated) DEVICE — REM POLYHESIVE ADULT PATIENT RETURN ELECTRODE: Brand: VALLEYLAB

## (undated) DEVICE — Device

## (undated) DEVICE — PINNACLE INTRODUCER SHEATH: Brand: PINNACLE

## (undated) DEVICE — CANNULA VENT 16FR MAL INTRO SIL CONN 0.25IN NVENT BULL TIP

## (undated) DEVICE — PLEDGET SUT SFT OVL 3 8X5 16IN

## (undated) DEVICE — INFECTION CONTROL KIT SYS

## (undated) DEVICE — SUT PROL 4-0 36IN RB1 DA BLU --

## (undated) DEVICE — IV START KIT: Brand: MEDLINE

## (undated) DEVICE — CUFF BLD PRSS AD CLTH SGL TB W/ BAYNT CONN ROUNDED CORNER

## (undated) DEVICE — DRAPE SLUSH DISC W44XL66IN ST FOR RND BSIN HUSH SLUSH SYS

## (undated) DEVICE — TEMP PACING WIRE: Brand: MYO/WIRE

## (undated) DEVICE — WOUND RETRACTOR AND PROTECTOR: Brand: ALEXIS O WOUND PROTECTOR-RETRACTOR

## (undated) DEVICE — DERMABOND SKIN ADH 0.7ML -- DERMABOND ADVANCED 12/BX

## (undated) DEVICE — 6 FOOT DISPOSABLE EXTENSION CABLE WITH SAFE CONNECT / SCREW-DOWN

## (undated) DEVICE — BAG RED 3PLY 2MIL 30X40 IN

## (undated) DEVICE — KIT PERF CANN 21FR L18CM FEM ART PERC 3/8IN VENT CONN

## (undated) DEVICE — COR-KNOT MINI® COMBO KITBASE PACKAGE TYPE - KITEACH STERILE PACKAGE KIT CONTAINS (2) SINGLE PATIENT USE COR-KNOT MINI® DEVICES AND (12) COR-KNOT® QUICK LOADS®.: Brand: COR-KNOT MINI®

## (undated) DEVICE — DRAPE PRB US TRNSDCR 6X96IN --

## (undated) DEVICE — STERILE POLYISOPRENE POWDER-FREE SURGICAL GLOVES: Brand: PROTEXIS

## (undated) DEVICE — CANNULA PERF 19FR L30IN MULTISTAGE FEM VEN W/ INSRT KT

## (undated) DEVICE — GOWN,SIRUS,NONRNF,SETINSLV,XL,20/CS: Brand: MEDLINE

## (undated) DEVICE — PLEDGET SURG W3/16XL0.25IN THK1.65MM PTFE OVL FELT FOR THE

## (undated) DEVICE — LEAD PCMKR MYOCARDL BPLR TEMP. --

## (undated) DEVICE — BLADE ELECTRODE: Brand: EDGE

## (undated) DEVICE — TUBING, SUCTION, 1/4" X 12', STRAIGHT: Brand: MEDLINE

## (undated) DEVICE — DRSG BORDR MPLX HEEL 8.7X9.1IN --

## (undated) DEVICE — STERILE POLYISOPRENE POWDER-FREE SURGICAL GLOVES WITH EMOLLIENT COATING: Brand: PROTEXIS

## (undated) DEVICE — PACK PROCEDURE SURG HRT CATH

## (undated) DEVICE — STRAP,POSITIONING,KNEE/BODY,FOAM,4X60": Brand: MEDLINE

## (undated) DEVICE — TTL1LYR 16FR10ML 100%SIL TMPST TR: Brand: MEDLINE

## (undated) DEVICE — SUT PROL 3-0 36IN SH DA BLU --

## (undated) DEVICE — SUTURE PROL SZ 5-0 L30IN NONABSORBABLE BLU L13MM RB-2 1/2 8710H

## (undated) DEVICE — DRAPE FLD WRM W44XL66IN C6L FOR INTRATEMP SYS THERMABASIN

## (undated) DEVICE — PREP SKN CHLRAPRP APL 26ML STR --

## (undated) DEVICE — DRESSING HEMOSTATIC SFT INTVENT W/O SLT DBL WRP QUIKCLOT LF

## (undated) DEVICE — SET GRAV CK VLV NEEDLESS ST 3 GANGED 4WAY STPCOCK HI FLO 10

## (undated) DEVICE — SOLUTION IRRIG 1000ML H2O STRL BLT

## (undated) DEVICE — SUTURE VCRL SZ 0 L36IN ABSRB VLT L36MM CT-1 1/2 CIR J346H

## (undated) DEVICE — GUIDEWIRE VASC L145CM 0.035IN J TIP L3MM PTFE FIX COR NAMIC

## (undated) DEVICE — 6 INCH MONITORING EXTENSION SET: Brand: ICU MEDICAL

## (undated) DEVICE — SPONGE GZ W4XL4IN COT 12 PLY TYP VII WVN C FLD DSGN

## (undated) DEVICE — WRAP SURG W1.31XL1.34M CARD FOR PT 165-172CM THERMOWRP

## (undated) DEVICE — SOLUTION IV 500ML 0.9% SOD CHL FLX CONT

## (undated) DEVICE — CANNULA PERF 25FR L30IN MULTISTAGE FEM VEN W/ INSRT KT

## (undated) DEVICE — Device: Brand: VASCULAR DILATOR KIT

## (undated) DEVICE — CATHETER,URETHRAL,REDRUBBER,STRL,18FR: Brand: MEDLINE

## (undated) DEVICE — CATHETER ETER ANGIO L110CM OD5FR ID046IN L75CM 038IN 145DEG CARD

## (undated) DEVICE — KIT INFUS PMP 270ML 4ML/HR 2ML/SITE SOAK CATH L2.5IN

## (undated) DEVICE — SUTURE MERS 5MM TAPE L12IN NONABSORBABLE WHT L48MM CTX 1/2 RS22

## (undated) DEVICE — SUTURE ETHBND EXCEL SZ 3-0 L36IN NONABSORBABLE GRN BB L17MM X588H

## (undated) DEVICE — ENDOVENT PULMONARY CATHETER KIT: Brand: THRUPORT SYSTEMS ENDOVENT PULMONARY CATHETER KIT

## (undated) DEVICE — CATHETER ETER CARD MULTIPAK MULTIPAK 5FR PERFORMA

## (undated) DEVICE — DRAIN,WOUND,ROUND,24FR,5/16",FULL-FLUTED: Brand: MEDLINE

## (undated) DEVICE — SOLUTION IV 1000ML 0.9% SOD CHL

## (undated) DEVICE — ANGIOGRAPHY KIT CUST [K0910930B] [MERIT MEDICAL SYSTEMS INC]

## (undated) DEVICE — MEDI-TRACE CADENCE ADULT, DEFIBRILLATION ELECTRODE -RTS  (10 PR/PK) - PHILIPS: Brand: MEDI-TRACE CADENCE

## (undated) DEVICE — ANGIO-SEAL VIP VASCULAR CLOSURE DEVICE: Brand: ANGIO-SEAL

## (undated) DEVICE — SUTURE FIBERWIRE SZ 2 L38IN 97CM NONABSB BLU L26.5MM W/TWO TAPERED NEEDLES  1/2 CIRCLE AR7205

## (undated) DEVICE — SYR 3ML LL TIP 1/10ML GRAD --

## (undated) DEVICE — SUTURE VCRL SZ 0 L18IN ABSRB VLT L40MM CT 1/2 CIR J752D

## (undated) DEVICE — SOLUTION IV 1000ML PH 7.4 INJ NRMSOL R